# Patient Record
Sex: FEMALE | Race: WHITE | NOT HISPANIC OR LATINO | Employment: OTHER | URBAN - METROPOLITAN AREA
[De-identification: names, ages, dates, MRNs, and addresses within clinical notes are randomized per-mention and may not be internally consistent; named-entity substitution may affect disease eponyms.]

---

## 2017-01-20 ENCOUNTER — HOSPITAL ENCOUNTER (EMERGENCY)
Facility: HOSPITAL | Age: 67
Discharge: HOME | End: 2017-01-20
Attending: EMERGENCY MEDICINE
Payer: MEDICARE

## 2017-01-20 VITALS
RESPIRATION RATE: 18 BRPM | OXYGEN SATURATION: 95 % | WEIGHT: 220 LBS | HEIGHT: 68 IN | TEMPERATURE: 98 F | DIASTOLIC BLOOD PRESSURE: 86 MMHG | SYSTOLIC BLOOD PRESSURE: 153 MMHG | BODY MASS INDEX: 33.34 KG/M2 | HEART RATE: 96 BPM

## 2017-01-20 DIAGNOSIS — R07.9 CHEST PAIN, UNSPECIFIED TYPE: ICD-10-CM

## 2017-01-20 DIAGNOSIS — R82.71 BACTERIA IN URINE: ICD-10-CM

## 2017-01-20 DIAGNOSIS — J18.9 PNEUMONIA OF LEFT LUNG DUE TO INFECTIOUS ORGANISM, UNSPECIFIED PART OF LUNG: Primary | ICD-10-CM

## 2017-01-20 DIAGNOSIS — I10 ESSENTIAL HYPERTENSION: ICD-10-CM

## 2017-01-20 LAB
ALBUMIN SERPL BCP-MCNC: 3.3 G/DL
ALP SERPL-CCNC: 115 U/L
ALT SERPL W/O P-5'-P-CCNC: 26 U/L
ANION GAP SERPL CALC-SCNC: 14 MMOL/L
AST SERPL-CCNC: 25 U/L
BACTERIA #/AREA URNS HPF: ABNORMAL /HPF
BASOPHILS # BLD AUTO: 0 K/UL
BASOPHILS NFR BLD: 0 %
BILIRUB SERPL-MCNC: 1.1 MG/DL
BILIRUB UR QL STRIP: NEGATIVE
BUN SERPL-MCNC: 8 MG/DL
CALCIUM SERPL-MCNC: 9.7 MG/DL
CHLORIDE SERPL-SCNC: 106 MMOL/L
CLARITY UR: ABNORMAL
CO2 SERPL-SCNC: 22 MMOL/L
COLOR UR: YELLOW
CREAT SERPL-MCNC: 1 MG/DL
DIFFERENTIAL METHOD: ABNORMAL
EOSINOPHIL # BLD AUTO: 0 K/UL
EOSINOPHIL NFR BLD: 0.1 %
ERYTHROCYTE [DISTWIDTH] IN BLOOD BY AUTOMATED COUNT: 13.7 %
EST. GFR  (AFRICAN AMERICAN): >60 ML/MIN/1.73 M^2
EST. GFR  (NON AFRICAN AMERICAN): 58 ML/MIN/1.73 M^2
FLUAV AG SPEC QL IA: NEGATIVE
FLUBV AG SPEC QL IA: NEGATIVE
GLUCOSE SERPL-MCNC: 119 MG/DL
GLUCOSE UR QL STRIP: NEGATIVE
HCT VFR BLD AUTO: 36.5 %
HGB BLD-MCNC: 11.8 G/DL
HGB UR QL STRIP: ABNORMAL
KETONES UR QL STRIP: ABNORMAL
LEUKOCYTE ESTERASE UR QL STRIP: ABNORMAL
LYMPHOCYTES # BLD AUTO: 0.8 K/UL
LYMPHOCYTES NFR BLD: 6.9 %
MAGNESIUM SERPL-MCNC: 2.2 MG/DL
MCH RBC QN AUTO: 29 PG
MCHC RBC AUTO-ENTMCNC: 32.3 %
MCV RBC AUTO: 90 FL
MICROSCOPIC COMMENT: ABNORMAL
MONOCYTES # BLD AUTO: 1 K/UL
MONOCYTES NFR BLD: 7.9 %
NEUTROPHILS # BLD AUTO: 10.2 K/UL
NEUTROPHILS NFR BLD: 85.1 %
NITRITE UR QL STRIP: NEGATIVE
PH UR STRIP: 7 [PH] (ref 5–8)
PLATELET # BLD AUTO: 281 K/UL
PMV BLD AUTO: 9.1 FL
POTASSIUM SERPL-SCNC: 4.1 MMOL/L
PROT SERPL-MCNC: 7.7 G/DL
PROT UR QL STRIP: ABNORMAL
RBC # BLD AUTO: 4.07 M/UL
RBC #/AREA URNS HPF: 8 /HPF (ref 0–4)
SODIUM SERPL-SCNC: 142 MMOL/L
SP GR UR STRIP: <=1.005 (ref 1–1.03)
SPECIMEN SOURCE: NORMAL
URN SPEC COLLECT METH UR: ABNORMAL
UROBILINOGEN UR STRIP-ACNC: ABNORMAL EU/DL
WBC # BLD AUTO: 11.95 K/UL
WBC #/AREA URNS HPF: 40 /HPF (ref 0–5)

## 2017-01-20 PROCEDURE — 85025 COMPLETE CBC W/AUTO DIFF WBC: CPT

## 2017-01-20 PROCEDURE — 87077 CULTURE AEROBIC IDENTIFY: CPT

## 2017-01-20 PROCEDURE — 80053 COMPREHEN METABOLIC PANEL: CPT

## 2017-01-20 PROCEDURE — 81000 URINALYSIS NONAUTO W/SCOPE: CPT

## 2017-01-20 PROCEDURE — 87086 URINE CULTURE/COLONY COUNT: CPT

## 2017-01-20 PROCEDURE — 87088 URINE BACTERIA CULTURE: CPT

## 2017-01-20 PROCEDURE — 96372 THER/PROPH/DIAG INJ SC/IM: CPT

## 2017-01-20 PROCEDURE — 63600175 PHARM REV CODE 636 W HCPCS: Performed by: EMERGENCY MEDICINE

## 2017-01-20 PROCEDURE — 87186 SC STD MICRODIL/AGAR DIL: CPT

## 2017-01-20 PROCEDURE — 25000003 PHARM REV CODE 250: Performed by: EMERGENCY MEDICINE

## 2017-01-20 PROCEDURE — 83735 ASSAY OF MAGNESIUM: CPT

## 2017-01-20 PROCEDURE — 99284 EMERGENCY DEPT VISIT MOD MDM: CPT | Mod: 25

## 2017-01-20 PROCEDURE — 87400 INFLUENZA A/B EACH AG IA: CPT | Mod: 59

## 2017-01-20 RX ORDER — QUETIAPINE FUMARATE 400 MG/1
400 TABLET, FILM COATED ORAL NIGHTLY
Status: ON HOLD | COMMUNITY
End: 2021-07-22 | Stop reason: SDUPTHER

## 2017-01-20 RX ORDER — CEFTRIAXONE 1 G/1
1 INJECTION, POWDER, FOR SOLUTION INTRAMUSCULAR; INTRAVENOUS
Status: COMPLETED | OUTPATIENT
Start: 2017-01-20 | End: 2017-01-20

## 2017-01-20 RX ORDER — LEVOFLOXACIN 500 MG/1
500 TABLET, FILM COATED ORAL DAILY
Qty: 10 TABLET | Refills: 0 | Status: SHIPPED | OUTPATIENT
Start: 2017-01-20 | End: 2017-01-30

## 2017-01-20 RX ORDER — MOXIFLOXACIN HYDROCHLORIDE 400 MG/1
400 TABLET ORAL ONCE
Status: COMPLETED | OUTPATIENT
Start: 2017-01-20 | End: 2017-01-20

## 2017-01-20 RX ORDER — ACETAMINOPHEN 325 MG/1
650 TABLET ORAL
Status: COMPLETED | OUTPATIENT
Start: 2017-01-20 | End: 2017-01-20

## 2017-01-20 RX ADMIN — CEFTRIAXONE SODIUM 1 G: 1 INJECTION, POWDER, FOR SOLUTION INTRAMUSCULAR; INTRAVENOUS at 03:01

## 2017-01-20 RX ADMIN — ACETAMINOPHEN 650 MG: 325 TABLET ORAL at 01:01

## 2017-01-20 RX ADMIN — MOXIFLOXACIN HYDROCHLORIDE 400 MG: 400 TABLET, FILM COATED ORAL at 03:01

## 2017-01-20 NOTE — ED PROVIDER NOTES
SCRIBE #1 NOTE: I, Lilibeth Amato, am scribing for, and in the presence of, Jonna Yen DO. I have scribed the entire note.      History      Chief Complaint   Patient presents with    Shortness of Breath     home health nurse heard rattling in lungs, fever, and also diarrhea usually associated with UTIs, has suprapubic catheter       Review of patient's allergies indicates:   Allergen Reactions    Nucynta [tapentadol] Nausea And Vomiting    Prochlorperazine Nausea And Vomiting    Stadol [butorphanol tartrate] Nausea And Vomiting    Morpholine analogues     Reglan [metoclopramide hcl]     Toradol [ketorolac]     Zofran [ondansetron hcl (pf)]         HPI   HPI    1/20/2017, 12:18 PM   History obtained from the patient and       History of Present Illness: Desirae No is a 66 y.o. female patient who presents to the Emergency Department for shortness of breath and cough which onset gradually yesterday.  The patient's home health nurse had heard rattling in the lungs.  Pt has PMHx of PE, UTI, and suprapubic catheter (on chronic Bactrim). Symptoms are constant and moderate in severity. Pt rates 6/10 pain scale. Pt reports current pain is not similar to PE.  The pain is located in the center of the chest.  Pain is exacerbated with inhaling. Associated sxs include fever (101.7F TMax), diarrhea, nonproductive cough (this morning). Patient denies any, chills, abd pain, diaphoresis, palpitations, extremity weakness/numbness, leg pain/swelling, dizziness, calf pain, calf tenderness, n/v, and all other sxs at this time. Prior Tx includes Imodium for diarrhea. Pt takes Bactrim for UTI.  No further complaints or concerns at this time.       Arrival mode: Personal vehicle      PCP: Fauzia Muro MD       Past Medical History:  Past Medical History   Diagnosis Date    DDD (degenerative disc disease), lumbar 4/11/2014    Hypertension     Neurogenic bladder     Pulmonary embolism 4/11/2014        Past Surgical History:  Past Surgical History   Procedure Laterality Date    Cholecystectomy      Joint replacement      Cervical fusion      Neck fusion      Hysterectomy  1974     menorrhagia         Family History:  Family History   Problem Relation Age of Onset    Stroke Father        Social History:  Social History     Social History Main Topics    Smoking status: Never Smoker    Smokeless tobacco: Never Used    Alcohol use No    Drug use: No    Sexual activity: Yes     Partners: Male     Birth control/ protection: None, Surgical       ROS   Review of Systems   Constitutional: Positive for fever (101.7 TMax). Negative for chills and diaphoresis.   HENT: Negative for sore throat.    Respiratory: Positive for cough. Negative for shortness of breath.    Cardiovascular: Positive for chest pain. Negative for palpitations and leg swelling.   Gastrointestinal: Positive for diarrhea. Negative for abdominal pain, nausea and vomiting.   Genitourinary: Negative for dysuria.   Musculoskeletal: Negative for back pain.   Skin: Negative for rash.   Neurological: Negative for dizziness, weakness and numbness.   Hematological: Does not bruise/bleed easily.       Physical Exam    Initial Vitals   BP Pulse Resp Temp SpO2   01/20/17 1202 01/20/17 1202 01/20/17 1202 01/20/17 1202 01/20/17 1202   136/85 102 18 99.1 °F (37.3 °C) 94 %      Physical Exam  Nursing Notes and Vital Signs Reviewed.  Constitutional: Patient is in no acute distress. Awake and alert. Well-developed and well-nourished.  Head: Atraumatic. Normocephalic.  Eyes: PERRL. EOM intact. Conjunctivae are not pale. No scleral icterus.  ENT: Mucous membranes are moist. Oropharynx is clear and symmetric.    Neck: Supple. Full ROM. No lymphadenopathy.  Cardiovascular: Tachycardic. No murmurs, rubs, or gallops. Distal pulses are 2+ and symmetric.  Pulmonary/Chest: No respiratory distress. No wheezing, rales. Rhonchi in left lung base.  Abdominal: Soft and  "non-distended.  There is no tenderness.  No rebound, guarding, or rigidity.  Good bowel sounds.  Indwelling suprapubic miller.  Genitourinary: No CVA tenderness  Musculoskeletal: Moves all extremities. No obvious deformities. No edema. No calf tenderness.  Skin: Warm and dry.  Neurological:  Alert, awake, and appropriate.  Normal speech.  No acute focal neurological deficits are appreciated.  Psychiatric: Normal affect. Good eye contact. Appropriate in content.    ED Course    Procedures  ED Vital Signs:  Vitals:    01/20/17 1202 01/20/17 1322 01/20/17 1342 01/20/17 1432   BP: 136/85  (!) 151/92 (!) 158/77   Pulse: 102  96 95   Resp: 18  18 18   Temp: 99.1 °F (37.3 °C) 99.1 °F (37.3 °C)  98.9 °F (37.2 °C)   TempSrc: Oral   Oral   SpO2: (!) 94%  (!) 93% (!) 94%   Weight: 99.8 kg (220 lb)      Height: 5' 8" (1.727 m)       01/20/17 1537   BP: (!) 153/86   Pulse: 96   Resp: 18   Temp: 98.3 °F (36.8 °C)   TempSrc: Oral   SpO2: 95%   Weight:    Height:        Abnormal Lab Results:  Labs Reviewed   CBC W/ AUTO DIFFERENTIAL - Abnormal; Notable for the following:        Result Value    Hemoglobin 11.8 (*)     Hematocrit 36.5 (*)     MPV 9.1 (*)     Gran # 10.2 (*)     Lymph # 0.8 (*)     Gran% 85.1 (*)     Lymph% 6.9 (*)     All other components within normal limits   COMPREHENSIVE METABOLIC PANEL - Abnormal; Notable for the following:     CO2 22 (*)     Glucose 119 (*)     Albumin 3.3 (*)     Total Bilirubin 1.1 (*)     eGFR if non  58 (*)     All other components within normal limits   URINALYSIS - Abnormal; Notable for the following:     Appearance, UA Hazy (*)     Specific Gravity, UA <=1.005 (*)     Protein, UA Trace (*)     Ketones, UA Trace (*)     Occult Blood UA 2+ (*)     Urobilinogen, UA 4.0-6.0 (*)     Leukocytes, UA 3+ (*)     All other components within normal limits    Narrative:     Suprapubic miller   URINALYSIS MICROSCOPIC - Abnormal; Notable for the following:     RBC, UA 8 (*)     WBC, UA " 40 (*)     All other components within normal limits    Narrative:     Suprapubic miller   CULTURE, URINE   MAGNESIUM   INFLUENZA A AND B ANTIGEN        All Lab Results:  Results for orders placed or performed during the hospital encounter of 01/20/17   CBC auto differential   Result Value Ref Range    WBC 11.95 3.90 - 12.70 K/uL    RBC 4.07 4.00 - 5.40 M/uL    Hemoglobin 11.8 (L) 12.0 - 16.0 g/dL    Hematocrit 36.5 (L) 37.0 - 48.5 %    MCV 90 82 - 98 fL    MCH 29.0 27.0 - 31.0 pg    MCHC 32.3 32.0 - 36.0 %    RDW 13.7 11.5 - 14.5 %    Platelets 281 150 - 350 K/uL    MPV 9.1 (L) 9.2 - 12.9 fL    Gran # 10.2 (H) 1.8 - 7.7 K/uL    Lymph # 0.8 (L) 1.0 - 4.8 K/uL    Mono # 1.0 0.3 - 1.0 K/uL    Eos # 0.0 0.0 - 0.5 K/uL    Baso # 0.00 0.00 - 0.20 K/uL    Gran% 85.1 (H) 38.0 - 73.0 %    Lymph% 6.9 (L) 18.0 - 48.0 %    Mono% 7.9 4.0 - 15.0 %    Eosinophil% 0.1 0.0 - 8.0 %    Basophil% 0.0 0.0 - 1.9 %    Differential Method Automated    Comprehensive metabolic panel   Result Value Ref Range    Sodium 142 136 - 145 mmol/L    Potassium 4.1 3.5 - 5.1 mmol/L    Chloride 106 95 - 110 mmol/L    CO2 22 (L) 23 - 29 mmol/L    Glucose 119 (H) 70 - 110 mg/dL    BUN, Bld 8 8 - 23 mg/dL    Creatinine 1.0 0.5 - 1.4 mg/dL    Calcium 9.7 8.7 - 10.5 mg/dL    Total Protein 7.7 6.0 - 8.4 g/dL    Albumin 3.3 (L) 3.5 - 5.2 g/dL    Total Bilirubin 1.1 (H) 0.1 - 1.0 mg/dL    Alkaline Phosphatase 115 55 - 135 U/L    AST 25 10 - 40 U/L    ALT 26 10 - 44 U/L    Anion Gap 14 8 - 16 mmol/L    eGFR if African American >60 >60 mL/min/1.73 m^2    eGFR if non African American 58 (A) >60 mL/min/1.73 m^2   Magnesium   Result Value Ref Range    Magnesium 2.2 1.6 - 2.6 mg/dL   Urinalysis   Result Value Ref Range    Specimen UA Urine, Catheterized     Color, UA Yellow Yellow, Straw, Janet    Appearance, UA Hazy (A) Clear    pH, UA 7.0 5.0 - 8.0    Specific Gravity, UA <=1.005 (A) 1.005 - 1.030    Protein, UA Trace (A) Negative    Glucose, UA Negative Negative     Ketones, UA Trace (A) Negative    Bilirubin (UA) Negative Negative    Occult Blood UA 2+ (A) Negative    Nitrite, UA Negative Negative    Urobilinogen, UA 4.0-6.0 (A) <2.0 EU/dL    Leukocytes, UA 3+ (A) Negative   Influenza antigen Nasopharyngeal Swab   Result Value Ref Range    Influenza A Ag, EIA Negative Negative    Influenza B Ag, EIA Negative Negative    Flu A & B Source Nasopharyngeal Swab    Urinalysis Microscopic   Result Value Ref Range    RBC, UA 8 (H) 0 - 4 /hpf    WBC, UA 40 (H) 0 - 5 /hpf    Bacteria, UA Occasional None-Occ /hpf    Microscopic Comment SEE COMMENT          Imaging Results:  Imaging Results         X-Ray Chest PA And Lateral (Final result) Result time:  01/20/17 13:59:49    Final result by ALISHA Woodward Sr., MD (01/20/17 13:59:49)    Impression:        1.  There has been interval development of a mild amount of haziness in the lateral aspect of the lingula.  This is consistent with the patient's history and characteristic of a subtle pneumonia.  2.  There are minimal degenerative changes in the thoracic spine.    3.  There is anterior and posterior spinal fusion hardware in the cervical spine.        Electronically signed by: ALISHA WOODWARD MD  Date:     01/20/17  Time:    13:59     Narrative:    Two-view chest x-ray    Clinical History:  Fever    Finding: Comparison was made to a prior examination performed on 7/30/2015.  The size of the heart is normal.  There has been interval development of a mild amount of haziness in the lateral aspect of the lingula.  The right lung is clear.  There is no pneumothorax or pleural effusion.  There are minimal degenerative changes in the thoracic spine.  There is anterior and posterior spinal fusion hardware in the cervical spine.             The EKG was ordered, reviewed, and independently interpreted by the ED provider.  Interpretation time: 1434  Rate: 93 BPM  Rhythm: normal sinus rhythm  Interpretation: Septal infarct. No STEMI.           The  "Emergency Provider reviewed the vital signs and test results, which are outlined above.    ED Discussion     2:30 PM: Re-evaluated pt. Pt states she has chest pressure.  Nursing unable to place saline lock and unable to get enough blood.  Unable to lactic acid sample.     discussed with family and pt with all options including Central Line, ultrasound guidance IV placement, plain straight stick.  Patient has a history of difficult IV placement.   Pt refused all options. Pt has had Central Line attemped before her in neck and notes "frequently they are not able to place them.:  Patient notes she is having worsening chest pressure.  EKG does not show any acute changes. I discussed miss rate of EKG with NSTEMI.   I discused recommendation obtain new blood sample to check heart enzymes. Patient understands and repeated to me if missed opportunity to identify NSTEMI could result in death, permanent disability or loss of current lifestyle. Patient is refusing further attempts at blood draws, even to diagnosis NSTEMI.  Discussed treatment options with family and patient.   She had elected to receive IM injection of Rocephin to cover potential UTI and avelox to treat pneumonia.  Pt is not tachypnic, vital signs are stable, and not in any acute distress.  There is no conversational dyspnea.  Pt's CURb  65 is 1. Pt's Pneumonia Severity Index is Class 1.  Therefore treatment is outpatient for pneumonia.  Discussed in detail with patient and spouse to return to the ED for worsening SOB, weakness, confusion or worsening condition.  Although unable to acquire lactic acid, there is no Sirs/Sepsis.  There is no leukocytosis no toxic granulocytes or bandemia.   Patients vitals have remained stable.  Will give patient RX of Levaquin which should cover UTI and pneumonia.  Currently awaiting urine culture results.     3:30 PM: Reassessed pt at this time.  Pt states her condition has improved at this time. Discussed with pt all " pertinent ED information and results. Discussed pt dx and plan of tx. Gave pt all f/u and return to the ED instructions. All questions and concerns were addressed at this time. Pt expresses understanding of information and instructions, and is comfortable with plan to discharge. Pt is stable for discharge.    Pre-hypertension/Hypertension: The pt has been informed that they may have pre-hypertension or hypertension based on a blood pressure reading in the ED. I recommend that the pt call the PCP listed on their discharge instructions or a physician of their choice this week to arrange f/u for further evaluation of possible pre-hypertension or hypertension.     I have discussed with patient and/or family/caretaker chest pain precautions, specifically to return for worsening chest pain, shortness of breath, fever, or any concern.  I have low suspicion for cardiopulmonary, vascular, infectious, respiratory, or other emergent medical condition based on my evaluation in the ED.      ED Medication(s):  Medications   acetaminophen tablet 650 mg (650 mg Oral Given 1/20/17 1322)   cefTRIAXone injection 1 g (1 g Intramuscular Given 1/20/17 1525)   moxifloxacin tablet 400 mg (400 mg Oral Given 1/20/17 1525)       Discharge Medication List as of 1/20/2017  2:55 PM      START taking these medications    Details   levoFLOXacin (LEVAQUIN) 500 MG tablet Take 1 tablet (500 mg total) by mouth once daily., Starting 1/20/2017, Until Mon 1/30/17, Print             Follow-up Information     Follow up with Fauzia Muro MD. Schedule an appointment as soon as possible for a visit in 2 days.    Specialty:  Family Medicine    Why:  Return to the ED for: confusion, worsening pain, nausea, vomiting, fever, passing out, weakness or other concerns.     Contact information:    [address] [address] [address] [phone]              Medical Decision Making    Medical Decision Making:   Clinical Tests:   Lab Tests: Reviewed  and Ordered  Radiological Study: Reviewed and Ordered           Scribe Attestation:   Scribe #1: I performed the above scribed service and the documentation accurately describes the services I performed. I attest to the accuracy of the note.    Attending:   Physician Attestation Statement for Scribe #1: I, Jonna Yen DO, personally performed the services described in this documentation, as scribed by Lilibeth Amato, in my presence, and it is both accurate and complete.          Clinical Impression       ICD-10-CM ICD-9-CM   1. Pneumonia of left lung due to infectious organism, unspecified part of lung J18.9 486   2. Bacteria in urine R82.71 791.9   3. Chest pain, unspecified type R07.9 786.50   4. Essential hypertension I10 401.9       Disposition:   Disposition: Discharged  Condition: Stable           Jonna Yen DO  01/20/17 8940

## 2017-01-20 NOTE — ED NOTES
Pt spoke with Dr Yen about POC. Pt refused another IV attempt or central line access. MD explained risks and benefits of treatment. Pt understood and refused care. Pt willing to take IM antibiotics.

## 2017-01-20 NOTE — ED NOTES
Pt sitting upright in bed, aaox4, in no acute distress. Pt with no complaint at this time, stating she only has chest soreness when she coughs. Pt with no other complaint at this time. Pt updated on POC. Will continue to monitor. Bed in low position, side rails up, call light in reach, family at bedside.

## 2017-01-20 NOTE — ED AVS SNAPSHOT
[facility]  [address] [facility] [address]               Desirae No   2017 12:06 PM   ED    Description:  Female : 1950   Department:  [facility]            Your Care was Coordinated By:     Provider Role From To    Jonna Yen DO Attending Provider 17 1210 --      Reason for Visit     Shortness of Breath           Diagnoses this Visit        Comments    Pneumonia of left lung due to infectious organism, unspecified part of lung    -  Primary     Bacteria in urine         Chest pain, unspecified type           ED Disposition     None           To Do List           Follow-up Information     Follow up with Fauzia Muro MD. Schedule an appointment as soon as possible for a visit in 2 days.    Specialty:  Family Medicine    Why:  Return to the ED for: confusion, worsening pain, nausea, vomiting, fever, passing out, weakness or other concerns.     Contact information:    [address] [address] [address]  [phone]         These Medications        Disp Refills Start End    levoFLOXacin (LEVAQUIN) 500 MG tablet 10 tablet 0 2017    Take 1 tablet (500 mg total) by mouth once daily. - Oral    Pharmacy: [facility], [address] #: [phone]         [facility]     [facility] -  Assistance  Registered nurses in the [facility] provide clinical advisement, health education, appointment booking, and other advisory services.  Call for this free service at [phone].             Medications           Message regarding Medications     Verify the changes and/or additions to your medication regime listed below are the same as discussed with your clinician today.  If any of these changes or additions are incorrect, please notify your healthcare provider.        START taking these NEW medications         Refills    levoFLOXacin (LEVAQUIN) 500 MG tablet 0    Sig: Take 1 tablet (500 mg total) by mouth once daily.    Class: Print    Route: Oral      These medications were administered today        Dose Freq    acetaminophen tablet 650 mg 650 mg ED 1 Time    Sig: Take 2 tablets (650 mg total) by mouth ED 1 Time.    Class: Normal    Route: Oral    sodium chloride 0.9% bolus 1,000 mL 1,000 mL ED 1 Time    Sig: Inject 1,000 mLs into the vein ED 1 Time.    Class: Normal    Route: Intravenous    cefTRIAXone injection 1 g 1 g ED 1 Time    Sig: Inject 1 g into the muscle ED 1 Time.    Class: Normal    Route: Intramuscular    moxifloxacin tablet 400 mg 400 mg Once    Sig: Take 1 tablet (400 mg total) by mouth once.    Class: Normal    Route: Oral      STOP taking these medications     nystatin (MYCOSTATIN) ointment Apply topically 2 (two) times daily.    dicyclomine (BENTYL) 10 MG capsule Take 10 mg by mouth 2 (two) times daily.    ergocalciferol (ERGOCALCIFEROL) 50,000 unit Cap Take 1 capsule (50,000 Units total) by mouth every 7 days.           Verify that the below list of medications is an accurate representation of the medications you are currently taking.  If none reported, the list may be blank. If incorrect, please contact your healthcare provider. Carry this list with you in case of emergency.           Current Medications     aspirin (ECOTRIN) 81 MG EC tablet Take 81 mg by mouth once daily.    clonazepam (KLONOPIN) 2 MG Tab Take 2 mg by mouth 2 (two) times daily.    oxybutynin (DITROPAN) 5 MG Tab Take 5 mg by mouth 2 (two) times daily.     oxycodone (ROXICODONE) 15 MG Tab Take 30 mg by mouth every 4 (four) hours as needed.     oxymorphone (OPANA ER) 40 MG 12 hr tablet Take 40 mg by mouth every 12 (twelve) hours.    promethazine (PHENERGAN) 25 MG tablet Take 1 tablet (25 mg total) by mouth every 6 (six) hours as needed for Nausea.    quetiapine (SEROQUEL) 400 MG tablet Take 400 mg by mouth every evening.    tizanidine  "(ZANAFLEX) 4 MG tablet     cefTRIAXone injection 1 g Inject 1 g into the muscle ED 1 Time.    levoFLOXacin (LEVAQUIN) 500 MG tablet Take 1 tablet (500 mg total) by mouth once daily.    moxifloxacin tablet 400 mg Take 1 tablet (400 mg total) by mouth once.    sodium chloride 0.9% bolus 1,000 mL Inject 1,000 mLs into the vein ED 1 Time.           Clinical Reference Information           Your Vitals Were     BP Pulse Temp Resp Height Weight    158/77 (BP Location: Right arm, Patient Position: Sitting, BP Method: Automatic) 95 98.9 °F (37.2 °C) (Oral) 18 5' 8" (1.727 m) 99.8 kg (220 lb)    Last Period SpO2 BMI          04/24/1974 94% 33.45 kg/m2        Allergies as of 1/20/2017        Reactions    Nucynta [Tapentadol] Nausea And Vomiting    Prochlorperazine Nausea And Vomiting    Stadol [Butorphanol Tartrate] Nausea And Vomiting    Morpholine Analogues     Reglan [Metoclopramide Hcl]     Toradol [Ketorolac]     Zofran [Ondansetron Hcl (Pf)]       Immunizations Administered on Date of Encounter - 1/20/2017     None      ED Micro, Lab, POCT     Start Ordered       Status Ordering Provider    01/20/17 1235 01/20/17 1234  Influenza antigen Nasopharyngeal Swab  STAT      Final result     01/20/17 1235 01/20/17 1235  Urinalysis Microscopic  Once      Final result     01/20/17 1234 01/20/17 1234  CBC auto differential  STAT      Final result     01/20/17 1234 01/20/17 1234  Comprehensive metabolic panel  STAT      Final result     01/20/17 1234 01/20/17 1234  Magnesium  STAT      Final result     01/20/17 1234 01/20/17 1234  Urinalysis  STAT     Comments:  Suprapubic miller    Final result     01/20/17 1234 01/20/17 1234  Urine culture **CANNOT BE ORDERED STAT**  Once      In process     01/20/17 1234 01/20/17 1234  Blood Culture #1 **CANNOT BE ORDERED STAT**  Once      Acknowledged     01/20/17 1234 01/20/17 1234  Blood Culture #2 **CANNOT BE ORDERED STAT**  Once      Acknowledged     01/20/17 1234 01/20/17 1234  Lactic acid, " plasma  STAT      Acknowledged       ED Imaging Orders     Start Ordered       Status Ordering Provider    01/20/17 1235 01/20/17 1234  X-Ray Chest PA And Lateral  1 time imaging      Final result       Discharge References/Attachments     CHEST PAIN, UNCERTAIN CAUSE (ENGLISH)    PNEUMONIA, DISCHARGE INSTRUCTIONS FOR (ENGLISH)    LEVOFLOXACIN ORAL TABLET (ENGLISH)       [facility]  complies with applicable Federal civil rights laws and does not discriminate on the basis of race, color, national origin, age, disability, or sex.        Language Assistance Services     ATTENTION: Language assistance services are available, free of charge. Please call [phone].      ATENCIÓN: Si habla español, tiene a leos disposición servicios gratuitos de asistencia lingüística. Llame al [phone].     CHÚ Ý: N?u b?n nói Ti?ng Vi?t, có các d?ch v? h? tr? ngôn ng? mi?n phí dành cho b?n. G?i s? [phone].

## 2017-01-23 LAB — BACTERIA UR CULT: NORMAL

## 2017-02-03 PROBLEM — I70.0 ATHEROSCLEROSIS OF AORTA: Status: ACTIVE | Noted: 2017-02-03

## 2017-02-03 PROBLEM — Z93.59 SUPRAPUBIC CATHETER: Chronic | Status: ACTIVE | Noted: 2017-02-03

## 2017-02-03 PROBLEM — Z93.59 SUPRAPUBIC CATHETER: Status: ACTIVE | Noted: 2017-02-03

## 2017-02-03 PROBLEM — I70.0 ATHEROSCLEROSIS OF AORTA: Chronic | Status: ACTIVE | Noted: 2017-02-03

## 2017-02-06 ENCOUNTER — TELEPHONE (OUTPATIENT)
Dept: FAMILY MEDICINE | Facility: CLINIC | Age: 67
End: 2017-02-06

## 2017-02-06 ENCOUNTER — OFFICE VISIT (OUTPATIENT)
Dept: INTERNAL MEDICINE | Facility: CLINIC | Age: 67
End: 2017-02-06
Payer: MEDICARE

## 2017-02-06 VITALS
TEMPERATURE: 99 F | DIASTOLIC BLOOD PRESSURE: 86 MMHG | HEART RATE: 104 BPM | SYSTOLIC BLOOD PRESSURE: 126 MMHG | OXYGEN SATURATION: 96 %

## 2017-02-06 DIAGNOSIS — Z93.59 SUPRAPUBIC CATHETER: Chronic | ICD-10-CM

## 2017-02-06 DIAGNOSIS — B37.31 YEAST VAGINITIS: Primary | ICD-10-CM

## 2017-02-06 PROCEDURE — 99999 PR PBB SHADOW E&M-EST. PATIENT-LVL III: CPT | Mod: PBBFAC,,, | Performed by: PHYSICIAN ASSISTANT

## 2017-02-06 PROCEDURE — 99213 OFFICE O/P EST LOW 20 MIN: CPT | Mod: S$GLB,,, | Performed by: PHYSICIAN ASSISTANT

## 2017-02-06 PROCEDURE — 3079F DIAST BP 80-89 MM HG: CPT | Mod: S$GLB,,, | Performed by: PHYSICIAN ASSISTANT

## 2017-02-06 PROCEDURE — 1157F ADVNC CARE PLAN IN RCRD: CPT | Mod: S$GLB,,, | Performed by: PHYSICIAN ASSISTANT

## 2017-02-06 PROCEDURE — 1160F RVW MEDS BY RX/DR IN RCRD: CPT | Mod: S$GLB,,, | Performed by: PHYSICIAN ASSISTANT

## 2017-02-06 PROCEDURE — 1159F MED LIST DOCD IN RCRD: CPT | Mod: S$GLB,,, | Performed by: PHYSICIAN ASSISTANT

## 2017-02-06 PROCEDURE — 99499 UNLISTED E&M SERVICE: CPT | Mod: S$GLB,,, | Performed by: PHYSICIAN ASSISTANT

## 2017-02-06 PROCEDURE — 3074F SYST BP LT 130 MM HG: CPT | Mod: S$GLB,,, | Performed by: PHYSICIAN ASSISTANT

## 2017-02-06 RX ORDER — FLUCONAZOLE 150 MG/1
150 TABLET ORAL ONCE
Qty: 1 TABLET | Refills: 0 | Status: SHIPPED | OUTPATIENT
Start: 2017-02-06 | End: 2017-02-06

## 2017-02-06 RX ORDER — NYSTATIN 100000 U/G
CREAM TOPICAL 2 TIMES DAILY
Qty: 30 G | Refills: 5 | Status: SHIPPED | OUTPATIENT
Start: 2017-02-06 | End: 2017-02-17

## 2017-02-06 NOTE — PROGRESS NOTES
Subjective:       Patient ID: Desirae No is a 66 y.o. female.    Chief Complaint: possible yeast infection    Rash   This is a recurrent problem. The current episode started in the past 7 days. The problem is unchanged. Location: vaginal area. The rash is characterized by itchiness and redness. Associated with: recent antibiotics. Pertinent negatives include no fatigue, fever or shortness of breath. Past treatments include nothing.     Review of Systems   Constitutional: Negative for chills, fatigue and fever.   Respiratory: Negative for chest tightness and shortness of breath.    Cardiovascular: Negative for chest pain.   Gastrointestinal: Negative for abdominal pain.   Genitourinary: Negative for vaginal bleeding, vaginal discharge and vaginal pain.   Skin: Positive for rash.       Objective:      Physical Exam   Constitutional: She appears well-developed and well-nourished.   Skin: She is not diaphoretic.   Nursing note and vitals reviewed.      Assessment:       1. Yeast vaginitis    2. Suprapubic catheter        Plan:       Yeast vaginitis    Suprapubic catheter    Other orders  -     fluconazole (DIFLUCAN) 150 MG Tab; Take 1 tablet (150 mg total) by mouth once.  Dispense: 1 tablet; Refill: 0  -     nystatin (MYCOSTATIN) cream; Apply topically 2 (two) times daily.  Dispense: 30 g; Refill: 5

## 2017-02-06 NOTE — MR AVS SNAPSHOT
[facility]  [address] [place] [address]  Phone: [phone]  Fax: [phone]                  Desirae No   2017 1:20 PM   Office Visit    Description:  Female : 1950   Provider:  Mickey Umana III, PA-C   Department:  [facility]           Reason for Visit     possible yeast infection           Diagnoses this Visit        Comments    Yeast vaginitis    -  Primary     Suprapubic catheter                To Do List           Goals (5 Years of Data)     None       These Medications        Disp Refills Start End    fluconazole (DIFLUCAN) 150 MG Tab 1 tablet 0 2017    Take 1 tablet (150 mg total) by mouth once. - Oral    Pharmacy: [facility] [address] #: [phone]       nystatin (MYCOSTATIN) cream 30 g 5 2017     Apply topically 2 (two) times daily. - Topical (Top)    Pharmacy: [facility] [address] Ph #: [phone]         [facility]     [facility] Nurse Care [facility] -  Assistance  Registered nurses in the [facility] provide clinical advisement, health education, appointment booking, and other advisory services.  Call for this free service at [phone].             Medications           Message regarding Medications     Verify the changes and/or additions to your medication regime listed below are the same as discussed with your clinician today.  If any of these changes or additions are incorrect, please notify your healthcare provider.        START taking these NEW medications        Refills    fluconazole (DIFLUCAN) 150 MG Tab 0    Sig: Take 1 tablet (150 mg total) by mouth once.    Class: Normal    Route: Oral    nystatin (MYCOSTATIN) cream 5    Sig: Apply topically 2 (two) times daily.    Class: Normal    Route: Topical (Top)           Verify that the below list of medications is an accurate  representation of the medications you are currently taking.  If none reported, the list may be blank. If incorrect, please contact your healthcare provider. Carry this list with you in case of emergency.           Current Medications     aspirin (ECOTRIN) 81 MG EC tablet Take 81 mg by mouth once daily.    clonazepam (KLONOPIN) 2 MG Tab Take 2 mg by mouth 2 (two) times daily.    fluconazole (DIFLUCAN) 150 MG Tab Take 1 tablet (150 mg total) by mouth once.    nystatin (MYCOSTATIN) cream Apply topically 2 (two) times daily.    oxybutynin (DITROPAN) 5 MG Tab Take 5 mg by mouth 2 (two) times daily.     oxycodone (ROXICODONE) 15 MG Tab Take 30 mg by mouth every 4 (four) hours as needed.     oxymorphone (OPANA ER) 40 MG 12 hr tablet Take 40 mg by mouth every 12 (twelve) hours.    promethazine (PHENERGAN) 25 MG tablet Take 1 tablet (25 mg total) by mouth every 6 (six) hours as needed for Nausea.    quetiapine (SEROQUEL) 400 MG tablet Take 400 mg by mouth every evening.    tizanidine (ZANAFLEX) 4 MG tablet            Clinical Reference Information           Your Vitals Were     BP Pulse Temp Last Period SpO2       126/86 (BP Location: Left arm, Patient Position: Sitting, BP Method: Manual) 104 98.6 °F (37 °C) (Tympanic) 04/24/1974 96%       Blood Pressure          Most Recent Value    BP  126/86      Allergies as of 2/6/2017     Nucynta [Tapentadol]    Prochlorperazine    Stadol [Butorphanol Tartrate]    Morpholine Analogues    Reglan [Metoclopramide Hcl]    Toradol [Ketorolac]    Zofran [Ondansetron Hcl (Pf)]      Immunizations Administered on Date of Encounter - 2/6/2017     None      Instructions      Continue with  new medicines until gone. May take tylenol PRN fever. Increase fluids and rest. Call the clinic if not better in 3 to 5 days. Suggest togo to the Emergency Room if symptoms get much worse. Otherwise follow up with your PCP as scheduled.        Language Assistance Services     ATTENTION: Language assistance  services are available, free of charge. Please call [phone].      ATENCIÓN: Si habla rakan, tiene a leos disposición servicios gratuitos de asistencia lingüística. Llame al [phone].     CHÚ Ý: N?u b?n nói Ti?ng Vi?t, có các d?ch v? h? tr? ngôn ng? mi?n phí dành cho b?n. G?i s? [phone].         [facility] complies with applicable Federal civil rights laws and does not discriminate on the basis of race, color, national origin, age, disability, or sex.

## 2017-02-17 ENCOUNTER — OFFICE VISIT (OUTPATIENT)
Dept: INTERNAL MEDICINE | Facility: CLINIC | Age: 67
End: 2017-02-17
Payer: MEDICARE

## 2017-02-17 VITALS
HEIGHT: 68 IN | TEMPERATURE: 97 F | HEART RATE: 100 BPM | WEIGHT: 216.5 LBS | OXYGEN SATURATION: 95 % | DIASTOLIC BLOOD PRESSURE: 74 MMHG | SYSTOLIC BLOOD PRESSURE: 120 MMHG | BODY MASS INDEX: 32.81 KG/M2

## 2017-02-17 DIAGNOSIS — N89.8 VAGINAL ITCHING: Primary | ICD-10-CM

## 2017-02-17 DIAGNOSIS — B37.2 YEAST DERMATITIS: ICD-10-CM

## 2017-02-17 LAB
BACTERIA GENITAL QL WET PREP: ABNORMAL
CLUE CELLS VAG QL WET PREP: ABNORMAL
FILAMENT FUNGI VAG WET PREP-#/AREA: ABNORMAL
SPECIMEN SOURCE: ABNORMAL
T VAGINALIS GENITAL QL WET PREP: ABNORMAL
WBC #/AREA VAG WET PREP: ABNORMAL
YEAST GENITAL QL WET PREP: ABNORMAL

## 2017-02-17 PROCEDURE — 1126F AMNT PAIN NOTED NONE PRSNT: CPT | Mod: S$GLB,,, | Performed by: FAMILY MEDICINE

## 2017-02-17 PROCEDURE — 1159F MED LIST DOCD IN RCRD: CPT | Mod: S$GLB,,, | Performed by: FAMILY MEDICINE

## 2017-02-17 PROCEDURE — 3074F SYST BP LT 130 MM HG: CPT | Mod: S$GLB,,, | Performed by: FAMILY MEDICINE

## 2017-02-17 PROCEDURE — 99213 OFFICE O/P EST LOW 20 MIN: CPT | Mod: S$GLB,,, | Performed by: FAMILY MEDICINE

## 2017-02-17 PROCEDURE — 3078F DIAST BP <80 MM HG: CPT | Mod: S$GLB,,, | Performed by: FAMILY MEDICINE

## 2017-02-17 PROCEDURE — 99499 UNLISTED E&M SERVICE: CPT | Mod: S$GLB,,, | Performed by: FAMILY MEDICINE

## 2017-02-17 PROCEDURE — 99999 PR PBB SHADOW E&M-EST. PATIENT-LVL III: CPT | Mod: PBBFAC,,, | Performed by: FAMILY MEDICINE

## 2017-02-17 PROCEDURE — 1157F ADVNC CARE PLAN IN RCRD: CPT | Mod: S$GLB,,, | Performed by: FAMILY MEDICINE

## 2017-02-17 PROCEDURE — 87210 SMEAR WET MOUNT SALINE/INK: CPT

## 2017-02-17 RX ORDER — FLUCONAZOLE 150 MG/1
150 TABLET ORAL DAILY
Qty: 2 TABLET | Refills: 0 | Status: SHIPPED | OUTPATIENT
Start: 2017-02-17 | End: 2017-02-19

## 2017-02-17 RX ORDER — CLOTRIMAZOLE 1 %
CREAM (GRAM) TOPICAL 2 TIMES DAILY
Qty: 24 G | Refills: 0 | Status: SHIPPED | OUTPATIENT
Start: 2017-02-17 | End: 2017-05-23 | Stop reason: ALTCHOICE

## 2017-02-17 NOTE — PROGRESS NOTES
Subjective:       Patient ID: Desirae No is a 66 y.o. female.    Chief Complaint: Vaginal Itching    HPI Ms. No presents today vaginal itching. She complains of vaginal discharge for a couple days but it is not a lot; described as itchy, yellow, non bloody, without pelvic pain or abnormal vaginal bleeding.  She has a suprapubic catheter and has been having similar symptoms for about a year. She has done monistat and antifungal cream for the itching and feels the Lamasil worked better than nystatin. The itching she feels has gotten much worse.     Review of Systems   Constitutional: Negative for fatigue and fever.   Genitourinary: Positive for vaginal bleeding and vaginal discharge. Negative for flank pain, frequency and urgency.         Objective:        Physical Exam   Constitutional: She is oriented to person, place, and time. She appears well-developed and well-nourished.   Genitourinary:         Neurological: She is alert and oriented to person, place, and time.   Skin: Skin is warm.   Psychiatric: She has a normal mood and affect. Her behavior is normal.   Nursing note and vitals reviewed.        Assessment/Plan:   Vaginal itching  -     Vaginal Screen Vagina; Future; Expected date: 2/17/17-pending  -     fluconazole (DIFLUCAN) 150 MG Tab; Take 1 tablet (150 mg total) by mouth once daily. Repeat in 5 days  Dispense: 2 tablet; Refill: 0    Yeast dermatitis  -     clotrimazole (LOTRIMIN) 1 % cream; Apply topically 2 (two) times daily.  Dispense: 24 g; Refill: 0  -     fluconazole (DIFLUCAN) 150 MG Tab; Take 1 tablet (150 mg total) by mouth once daily. Repeat in 5 days  Dispense: 2 tablet; Refill: 0    Will follow up on Vaginal screen for potential other causes for her vaginal itching.    Return if symptoms worsen or fail to improve.    Amarilis Zacarias MD  [facility]   Family Medicine

## 2017-02-17 NOTE — MR AVS SNAPSHOT
[facility]  [address] [facility] [address]  Phone: [phone]  Fax: [phone]                  Desirae No   2017 2:20 PM   Office Visit    Description:  Female : 1950   Provider:  Amarilis Zacarias MD   Department:  [facility]           Reason for Visit     Vaginal Itching           Diagnoses this Visit        Comments    Vaginal itching    -  Primary            To Do List           Future Appointments        Provider Department Dept Phone    2017 2:20 PM Amarilis Zacarias MD [facility] [phone]      Goals (5 Years of Data)     None      [facility] Care [facility] -  Assistance  Registered nurses in the [facility] provide clinical advisement, health education, appointment booking, and other advisory services.  Call for this free service at [phone].             Medications           Message regarding Medications     Verify the changes and/or additions to your medication regime listed below are the same as discussed with your clinician today.  If any of these changes or additions are incorrect, please notify your healthcare provider.             Verify that the below list of medications is an accurate representation of the medications you are currently taking.  If none reported, the list may be blank. If incorrect, please contact your healthcare provider. Carry this list with you in case of emergency.           Current Medications     aspirin (ECOTRIN) 81 MG EC tablet Take 81 mg by mouth once daily.    nystatin (MYCOSTATIN) cream Apply topically 2 (two) times daily.    oxybutynin (DITROPAN) 5 MG Tab Take 5 mg by mouth 2 (two) times daily.     oxycodone (ROXICODONE) 15 MG Tab Take 30 mg by mouth every 4 (four) hours as needed.     oxymorphone (OPANA ER) 40 MG 12 hr tablet Take 40 mg by mouth every 12 (twelve) hours.    promethazine (PHENERGAN) 25 MG tablet Take 1  "tablet (25 mg total) by mouth every 6 (six) hours as needed for Nausea.    quetiapine (SEROQUEL) 400 MG tablet Take 400 mg by mouth every evening.    tizanidine (ZANAFLEX) 4 MG tablet     clonazepam (KLONOPIN) 2 MG Tab Take 2 mg by mouth 2 (two) times daily.           Clinical Reference Information           Your Vitals Were     BP Pulse Temp Height Weight Last Period    120/74 100 97.4 °F (36.3 °C) (Tympanic) 5' 8" (1.727 m) 98.2 kg (216 lb 7.9 oz) 04/24/1974    SpO2 BMI             95% 32.92 kg/m2         Blood Pressure          Most Recent Value    BP  120/74      Allergies as of 2/17/2017     Nucynta [Tapentadol]    Prochlorperazine    Stadol [Butorphanol Tartrate]    Morpholine Analogues    Reglan [Metoclopramide Hcl]    Toradol [Ketorolac]    Zofran [Ondansetron Hcl (Pf)]      Immunizations Administered on Date of Encounter - 2/17/2017     None      Orders Placed During Today's Visit      Normal Orders This Visit    Vaginal Screen Vagina     Future Labs/Procedures Expected by Expires    Vaginal Screen Vagina  2/17/2017 4/18/2018      Language Assistance Services     ATTENTION: Language assistance services are available, free of charge. Please call [phone].      ATENCIÓN: Si rachellela rakan, tiene a leos disposición servicios gratuitos de asistencia lingüística. Llame al [phone].     [facility] Ý: N?u b?n nói Ti?ng Vi?t, có các d?ch v? h? tr? ngôn ng? mi?n phí dành cho b?n. G?i s? [phone].         [facility] complies with applicable Federal civil rights laws and does not discriminate on the basis of race, color, national origin, age, disability, or sex.        "

## 2017-02-24 ENCOUNTER — TELEPHONE (OUTPATIENT)
Dept: INTERNAL MEDICINE | Facility: CLINIC | Age: 67
End: 2017-02-24

## 2017-02-24 NOTE — TELEPHONE ENCOUNTER
----- Message from Amarilis Zacarias MD sent at 2/20/2017  8:23 AM CST -----  Bacteria and WBCs on the vaginal swab. Please ask if her symptoms have improved. We can send antibiotics however she wasn't really having symptoms of UTI.

## 2017-05-23 ENCOUNTER — HOSPITAL ENCOUNTER (INPATIENT)
Facility: HOSPITAL | Age: 67
LOS: 6 days | Discharge: HOME | DRG: 698 | End: 2017-05-29
Attending: EMERGENCY MEDICINE | Admitting: INTERNAL MEDICINE
Payer: MEDICARE

## 2017-05-23 DIAGNOSIS — T83.511D URINARY TRACT INFECTION ASSOCIATED WITH CATHETERIZATION OF URINARY TRACT, UNSPECIFIED INDWELLING URINARY CATHETER TYPE, SUBSEQUENT ENCOUNTER: Primary | ICD-10-CM

## 2017-05-23 DIAGNOSIS — R00.0 TACHYCARDIA: ICD-10-CM

## 2017-05-23 DIAGNOSIS — R10.13 EPIGASTRIC ABDOMINAL PAIN: ICD-10-CM

## 2017-05-23 DIAGNOSIS — F13.20 BENZODIAZEPINE DEPENDENCE: ICD-10-CM

## 2017-05-23 DIAGNOSIS — R55 SYNCOPE: ICD-10-CM

## 2017-05-23 DIAGNOSIS — A41.9 SEPSIS: ICD-10-CM

## 2017-05-23 DIAGNOSIS — F11.20 CHRONIC NARCOTIC DEPENDENCE: ICD-10-CM

## 2017-05-23 DIAGNOSIS — R11.2 NON-INTRACTABLE VOMITING WITH NAUSEA, UNSPECIFIED VOMITING TYPE: ICD-10-CM

## 2017-05-23 DIAGNOSIS — E87.6 HYPOKALEMIA: ICD-10-CM

## 2017-05-23 DIAGNOSIS — N39.0 URINARY TRACT INFECTION ASSOCIATED WITH CATHETERIZATION OF URINARY TRACT, UNSPECIFIED INDWELLING URINARY CATHETER TYPE, SUBSEQUENT ENCOUNTER: Primary | ICD-10-CM

## 2017-05-23 DIAGNOSIS — A41.9 SEPSIS, DUE TO UNSPECIFIED ORGANISM: ICD-10-CM

## 2017-05-23 DIAGNOSIS — R79.89 ELEVATED TROPONIN: ICD-10-CM

## 2017-05-23 DIAGNOSIS — E86.0 DEHYDRATION: ICD-10-CM

## 2017-05-23 PROBLEM — T83.511A URINARY TRACT INFECTION ASSOCIATED WITH CATHETERIZATION OF URINARY TRACT: Status: ACTIVE | Noted: 2017-05-23

## 2017-05-23 PROBLEM — G93.40 ACUTE ENCEPHALOPATHY: Status: ACTIVE | Noted: 2017-05-23

## 2017-05-23 LAB
ALBUMIN SERPL BCP-MCNC: 4.1 G/DL
ALP SERPL-CCNC: 76 U/L
ALT SERPL W/O P-5'-P-CCNC: 14 U/L
AMPHET+METHAMPHET UR QL: NEGATIVE
ANION GAP SERPL CALC-SCNC: 12 MMOL/L
APTT BLDCRRT: 27 SEC
AST SERPL-CCNC: 22 U/L
BACTERIA #/AREA URNS HPF: ABNORMAL /HPF
BARBITURATES UR QL SCN>200 NG/ML: NEGATIVE
BASOPHILS # BLD AUTO: 0.01 K/UL
BASOPHILS NFR BLD: 0 %
BENZODIAZ UR QL SCN>200 NG/ML: NORMAL
BILIRUB SERPL-MCNC: 1.1 MG/DL
BILIRUB UR QL STRIP: ABNORMAL
BUN SERPL-MCNC: 12 MG/DL
BZE UR QL SCN: NEGATIVE
CALCIUM SERPL-MCNC: 10 MG/DL
CANNABINOIDS UR QL SCN: NEGATIVE
CHLORIDE SERPL-SCNC: 103 MMOL/L
CK MB SERPL-MCNC: 1.7 NG/ML
CK MB SERPL-RTO: 1.4 %
CK SERPL-CCNC: 125 U/L
CLARITY UR: CLEAR
CO2 SERPL-SCNC: 24 MMOL/L
COLOR UR: YELLOW
CREAT SERPL-MCNC: 0.8 MG/DL
CREAT UR-MCNC: 187.7 MG/DL
DIFFERENTIAL METHOD: ABNORMAL
EOSINOPHIL # BLD AUTO: 0 K/UL
EOSINOPHIL NFR BLD: 0 %
ERYTHROCYTE [DISTWIDTH] IN BLOOD BY AUTOMATED COUNT: 14.1 %
EST. GFR  (AFRICAN AMERICAN): >60 ML/MIN/1.73 M^2
EST. GFR  (NON AFRICAN AMERICAN): >60 ML/MIN/1.73 M^2
GLUCOSE SERPL-MCNC: 131 MG/DL
GLUCOSE UR QL STRIP: NEGATIVE
HCT VFR BLD AUTO: 47.7 %
HGB BLD-MCNC: 16.4 G/DL
HGB UR QL STRIP: ABNORMAL
HYALINE CASTS #/AREA URNS LPF: 1 /LPF
INR PPP: 1.2
KETONES UR QL STRIP: ABNORMAL
LACTATE SERPL-SCNC: 1 MMOL/L
LEUKOCYTE ESTERASE UR QL STRIP: ABNORMAL
LYMPHOCYTES # BLD AUTO: 2 K/UL
LYMPHOCYTES NFR BLD: 6.9 %
MAGNESIUM SERPL-MCNC: 2 MG/DL
MCH RBC QN AUTO: 30 PG
MCHC RBC AUTO-ENTMCNC: 34.4 %
MCV RBC AUTO: 87 FL
METHADONE UR QL SCN>300 NG/ML: NEGATIVE
MICROSCOPIC COMMENT: ABNORMAL
MONOCYTES # BLD AUTO: 1.8 K/UL
MONOCYTES NFR BLD: 6.3 %
NEUTROPHILS # BLD AUTO: 24.5 K/UL
NEUTROPHILS NFR BLD: 87.2 %
NITRITE UR QL STRIP: NEGATIVE
OPIATES UR QL SCN: NEGATIVE
PCP UR QL SCN>25 NG/ML: NEGATIVE
PH UR STRIP: 7 [PH] (ref 5–8)
PHOSPHATE SERPL-MCNC: 1.9 MG/DL
PLATELET # BLD AUTO: 384 K/UL
PMV BLD AUTO: 9.3 FL
POTASSIUM SERPL-SCNC: 2.6 MMOL/L
PROT SERPL-MCNC: 8.4 G/DL
PROT UR QL STRIP: ABNORMAL
PROTHROMBIN TIME: 12.1 SEC
RBC # BLD AUTO: 5.47 M/UL
RBC #/AREA URNS HPF: 4 /HPF (ref 0–4)
SODIUM SERPL-SCNC: 139 MMOL/L
SP GR UR STRIP: 1.02 (ref 1–1.03)
TOXICOLOGY INFORMATION: NORMAL
TROPONIN I SERPL DL<=0.01 NG/ML-MCNC: 0.11 NG/ML
URN SPEC COLLECT METH UR: ABNORMAL
UROBILINOGEN UR STRIP-ACNC: NEGATIVE EU/DL
WBC # BLD AUTO: 28.31 K/UL
WBC #/AREA URNS HPF: 25 /HPF (ref 0–5)

## 2017-05-23 PROCEDURE — 99285 EMERGENCY DEPT VISIT HI MDM: CPT | Mod: 25

## 2017-05-23 PROCEDURE — 96375 TX/PRO/DX INJ NEW DRUG ADDON: CPT

## 2017-05-23 PROCEDURE — 36569 INSJ PICC 5 YR+ W/O IMAGING: CPT

## 2017-05-23 PROCEDURE — 83605 ASSAY OF LACTIC ACID: CPT | Mod: 91

## 2017-05-23 PROCEDURE — 85730 THROMBOPLASTIN TIME PARTIAL: CPT | Mod: 91

## 2017-05-23 PROCEDURE — 96365 THER/PROPH/DIAG IV INF INIT: CPT

## 2017-05-23 PROCEDURE — 84100 ASSAY OF PHOSPHORUS: CPT

## 2017-05-23 PROCEDURE — 80053 COMPREHEN METABOLIC PANEL: CPT | Mod: 91

## 2017-05-23 PROCEDURE — 93010 ELECTROCARDIOGRAM REPORT: CPT | Mod: 76,,, | Performed by: INTERNAL MEDICINE

## 2017-05-23 PROCEDURE — 96366 THER/PROPH/DIAG IV INF ADDON: CPT

## 2017-05-23 PROCEDURE — 82553 CREATINE MB FRACTION: CPT

## 2017-05-23 PROCEDURE — 25000003 PHARM REV CODE 250: Performed by: EMERGENCY MEDICINE

## 2017-05-23 PROCEDURE — 96367 TX/PROPH/DG ADDL SEQ IV INF: CPT

## 2017-05-23 PROCEDURE — 11000001 HC ACUTE MED/SURG PRIVATE ROOM

## 2017-05-23 PROCEDURE — 85610 PROTHROMBIN TIME: CPT | Mod: 91

## 2017-05-23 PROCEDURE — 82570 ASSAY OF URINE CREATININE: CPT

## 2017-05-23 PROCEDURE — 83735 ASSAY OF MAGNESIUM: CPT

## 2017-05-23 PROCEDURE — 63600175 PHARM REV CODE 636 W HCPCS: Performed by: EMERGENCY MEDICINE

## 2017-05-23 PROCEDURE — 85025 COMPLETE CBC W/AUTO DIFF WBC: CPT | Mod: 91

## 2017-05-23 PROCEDURE — 87086 URINE CULTURE/COLONY COUNT: CPT

## 2017-05-23 PROCEDURE — 81000 URINALYSIS NONAUTO W/SCOPE: CPT | Mod: 91

## 2017-05-23 PROCEDURE — 84484 ASSAY OF TROPONIN QUANT: CPT | Mod: 91

## 2017-05-23 PROCEDURE — 96361 HYDRATE IV INFUSION ADD-ON: CPT | Mod: 59

## 2017-05-23 PROCEDURE — 96372 THER/PROPH/DIAG INJ SC/IM: CPT

## 2017-05-23 RX ORDER — POTASSIUM CHLORIDE 14.9 MG/ML
20 INJECTION INTRAVENOUS
Status: COMPLETED | OUTPATIENT
Start: 2017-05-23 | End: 2017-05-24

## 2017-05-23 RX ORDER — ACETAMINOPHEN 500 MG
1000 TABLET ORAL
Status: COMPLETED | OUTPATIENT
Start: 2017-05-23 | End: 2017-05-23

## 2017-05-23 RX ORDER — SODIUM CHLORIDE 9 MG/ML
INJECTION, SOLUTION INTRAVENOUS CONTINUOUS
Status: DISCONTINUED | OUTPATIENT
Start: 2017-05-24 | End: 2017-05-29 | Stop reason: HOSPADM

## 2017-05-23 RX ORDER — ENOXAPARIN SODIUM 100 MG/ML
40 INJECTION SUBCUTANEOUS EVERY 24 HOURS
Status: DISCONTINUED | OUTPATIENT
Start: 2017-05-24 | End: 2017-05-29 | Stop reason: HOSPADM

## 2017-05-23 RX ADMIN — SODIUM CHLORIDE 1000 ML: 0.9 INJECTION, SOLUTION INTRAVENOUS at 09:05

## 2017-05-23 RX ADMIN — PROMETHAZINE HYDROCHLORIDE 25 MG: 25 INJECTION INTRAMUSCULAR; INTRAVENOUS at 09:05

## 2017-05-23 RX ADMIN — ACETAMINOPHEN 1000 MG: 500 TABLET ORAL at 10:05

## 2017-05-24 LAB
ANION GAP SERPL CALC-SCNC: 11 MMOL/L
BASOPHILS # BLD AUTO: 0.02 K/UL
BASOPHILS NFR BLD: 0.1 %
BUN SERPL-MCNC: 13 MG/DL
CALCIUM SERPL-MCNC: 8.8 MG/DL
CHLORIDE SERPL-SCNC: 108 MMOL/L
CO2 SERPL-SCNC: 22 MMOL/L
CREAT SERPL-MCNC: 0.8 MG/DL
DIASTOLIC DYSFUNCTION: NO
DIFFERENTIAL METHOD: ABNORMAL
EOSINOPHIL # BLD AUTO: 0 K/UL
EOSINOPHIL NFR BLD: 0 %
ERYTHROCYTE [DISTWIDTH] IN BLOOD BY AUTOMATED COUNT: 14.3 %
EST. GFR  (AFRICAN AMERICAN): >60 ML/MIN/1.73 M^2
EST. GFR  (NON AFRICAN AMERICAN): >60 ML/MIN/1.73 M^2
GLUCOSE SERPL-MCNC: 124 MG/DL
HCT VFR BLD AUTO: 46.5 %
HGB BLD-MCNC: 15.9 G/DL
LYMPHOCYTES # BLD AUTO: 2.3 K/UL
LYMPHOCYTES NFR BLD: 7.8 %
MCH RBC QN AUTO: 30.2 PG
MCHC RBC AUTO-ENTMCNC: 34.2 %
MCV RBC AUTO: 88 FL
MONOCYTES # BLD AUTO: 1.8 K/UL
MONOCYTES NFR BLD: 6.2 %
NEUTROPHILS # BLD AUTO: 24.6 K/UL
NEUTROPHILS NFR BLD: 86.5 %
PLATELET # BLD AUTO: 384 K/UL
PMV BLD AUTO: 9.5 FL
POTASSIUM SERPL-SCNC: 3.1 MMOL/L
RBC # BLD AUTO: 5.26 M/UL
RETIRED EF AND QEF - SEE NOTES: 55 (ref 55–65)
SODIUM SERPL-SCNC: 141 MMOL/L
TROPONIN I SERPL DL<=0.01 NG/ML-MCNC: 0.09 NG/ML
TROPONIN I SERPL DL<=0.01 NG/ML-MCNC: 0.15 NG/ML
TROPONIN I SERPL DL<=0.01 NG/ML-MCNC: 0.15 NG/ML
WBC # BLD AUTO: 28.69 K/UL

## 2017-05-24 PROCEDURE — 21400001 HC TELEMETRY ROOM

## 2017-05-24 PROCEDURE — 63600175 PHARM REV CODE 636 W HCPCS: Performed by: INTERNAL MEDICINE

## 2017-05-24 PROCEDURE — 25000003 PHARM REV CODE 250: Performed by: EMERGENCY MEDICINE

## 2017-05-24 PROCEDURE — 84484 ASSAY OF TROPONIN QUANT: CPT | Mod: 91

## 2017-05-24 PROCEDURE — 25000003 PHARM REV CODE 250: Performed by: INTERNAL MEDICINE

## 2017-05-24 PROCEDURE — 63600175 PHARM REV CODE 636 W HCPCS: Performed by: EMERGENCY MEDICINE

## 2017-05-24 PROCEDURE — 63600175 PHARM REV CODE 636 W HCPCS: Performed by: NURSE PRACTITIONER

## 2017-05-24 PROCEDURE — C8929 TTE W OR WO FOL WCON,DOPPLER: HCPCS

## 2017-05-24 PROCEDURE — 80048 BASIC METABOLIC PNL TOTAL CA: CPT

## 2017-05-24 PROCEDURE — 25000003 PHARM REV CODE 250: Performed by: NURSE PRACTITIONER

## 2017-05-24 PROCEDURE — 36415 COLL VENOUS BLD VENIPUNCTURE: CPT

## 2017-05-24 PROCEDURE — 02HV33Z INSERTION OF INFUSION DEVICE INTO SUPERIOR VENA CAVA, PERCUTANEOUS APPROACH: ICD-10-PCS | Performed by: EMERGENCY MEDICINE

## 2017-05-24 PROCEDURE — 93306 TTE W/DOPPLER COMPLETE: CPT | Mod: 26,,, | Performed by: INTERNAL MEDICINE

## 2017-05-24 PROCEDURE — 85025 COMPLETE CBC W/AUTO DIFF WBC: CPT

## 2017-05-24 PROCEDURE — 93010 ELECTROCARDIOGRAM REPORT: CPT | Mod: ,,, | Performed by: INTERNAL MEDICINE

## 2017-05-24 PROCEDURE — 93005 ELECTROCARDIOGRAM TRACING: CPT

## 2017-05-24 RX ORDER — HYDRALAZINE HYDROCHLORIDE 20 MG/ML
10 INJECTION INTRAMUSCULAR; INTRAVENOUS EVERY 6 HOURS PRN
Status: DISCONTINUED | OUTPATIENT
Start: 2017-05-24 | End: 2017-05-29 | Stop reason: HOSPADM

## 2017-05-24 RX ORDER — SODIUM CHLORIDE 0.9 % (FLUSH) 0.9 %
10 SYRINGE (ML) INJECTION
Status: DISCONTINUED | OUTPATIENT
Start: 2017-05-24 | End: 2017-05-29 | Stop reason: HOSPADM

## 2017-05-24 RX ORDER — ACETAMINOPHEN 325 MG/1
650 TABLET ORAL EVERY 6 HOURS PRN
Status: DISCONTINUED | OUTPATIENT
Start: 2017-05-24 | End: 2017-05-29 | Stop reason: HOSPADM

## 2017-05-24 RX ORDER — ADHESIVE BANDAGE
30 BANDAGE TOPICAL DAILY PRN
Status: DISCONTINUED | OUTPATIENT
Start: 2017-05-24 | End: 2017-05-29 | Stop reason: HOSPADM

## 2017-05-24 RX ORDER — ENOXAPARIN SODIUM 100 MG/ML
1 INJECTION SUBCUTANEOUS
Status: COMPLETED | OUTPATIENT
Start: 2017-05-24 | End: 2017-05-24

## 2017-05-24 RX ORDER — POTASSIUM CHLORIDE 20 MEQ/1
40 TABLET, EXTENDED RELEASE ORAL ONCE
Status: COMPLETED | OUTPATIENT
Start: 2017-05-24 | End: 2017-05-24

## 2017-05-24 RX ORDER — IPRATROPIUM BROMIDE AND ALBUTEROL SULFATE 2.5; .5 MG/3ML; MG/3ML
3 SOLUTION RESPIRATORY (INHALATION) EVERY 4 HOURS PRN
Status: DISCONTINUED | OUTPATIENT
Start: 2017-05-24 | End: 2017-05-29 | Stop reason: HOSPADM

## 2017-05-24 RX ORDER — POLYETHYLENE GLYCOL 3350 17 G/17G
17 POWDER, FOR SOLUTION ORAL DAILY
Status: DISCONTINUED | OUTPATIENT
Start: 2017-05-24 | End: 2017-05-29 | Stop reason: HOSPADM

## 2017-05-24 RX ORDER — CEFEPIME HYDROCHLORIDE 1 G/50ML
1 INJECTION, SOLUTION INTRAVENOUS
Status: DISCONTINUED | OUTPATIENT
Start: 2017-05-24 | End: 2017-05-29 | Stop reason: HOSPADM

## 2017-05-24 RX ORDER — ONDANSETRON 2 MG/ML
4 INJECTION INTRAMUSCULAR; INTRAVENOUS EVERY 8 HOURS PRN
Status: DISCONTINUED | OUTPATIENT
Start: 2017-05-24 | End: 2017-05-29 | Stop reason: HOSPADM

## 2017-05-24 RX ORDER — SODIUM CHLORIDE 0.9 % (FLUSH) 0.9 %
10 SYRINGE (ML) INJECTION EVERY 6 HOURS
Status: DISCONTINUED | OUTPATIENT
Start: 2017-05-24 | End: 2017-05-29 | Stop reason: HOSPADM

## 2017-05-24 RX ADMIN — SODIUM CHLORIDE 1000 ML: 0.9 INJECTION, SOLUTION INTRAVENOUS at 12:05

## 2017-05-24 RX ADMIN — SODIUM CHLORIDE: 0.9 INJECTION, SOLUTION INTRAVENOUS at 11:05

## 2017-05-24 RX ADMIN — POTASSIUM CHLORIDE 20 MEQ: 200 INJECTION, SOLUTION INTRAVENOUS at 01:05

## 2017-05-24 RX ADMIN — SODIUM CHLORIDE, PRESERVATIVE FREE 10 ML: 5 INJECTION INTRAVENOUS at 06:05

## 2017-05-24 RX ADMIN — POLYETHYLENE GLYCOL 3350 17 G: 17 POWDER, FOR SOLUTION ORAL at 12:05

## 2017-05-24 RX ADMIN — ENOXAPARIN SODIUM 40 MG: 100 INJECTION SUBCUTANEOUS at 04:05

## 2017-05-24 RX ADMIN — ACETAMINOPHEN 650 MG: 325 TABLET ORAL at 09:05

## 2017-05-24 RX ADMIN — ENOXAPARIN SODIUM 90 MG: 100 INJECTION SUBCUTANEOUS at 12:05

## 2017-05-24 RX ADMIN — SODIUM CHLORIDE, PRESERVATIVE FREE 10 ML: 5 INJECTION INTRAVENOUS at 11:05

## 2017-05-24 RX ADMIN — ONDANSETRON 4 MG: 2 INJECTION INTRAMUSCULAR; INTRAVENOUS at 04:05

## 2017-05-24 RX ADMIN — POTASSIUM CHLORIDE 40 MEQ: 1500 TABLET, EXTENDED RELEASE ORAL at 03:05

## 2017-05-24 RX ADMIN — SODIUM CHLORIDE: 0.9 INJECTION, SOLUTION INTRAVENOUS at 05:05

## 2017-05-24 RX ADMIN — VANCOMYCIN HYDROCHLORIDE 1250 MG: 1 INJECTION, POWDER, LYOPHILIZED, FOR SOLUTION INTRAVENOUS at 03:05

## 2017-05-24 RX ADMIN — ACETAMINOPHEN 650 MG: 325 TABLET ORAL at 03:05

## 2017-05-24 RX ADMIN — SODIUM CHLORIDE: 0.9 INJECTION, SOLUTION INTRAVENOUS at 12:05

## 2017-05-24 RX ADMIN — POTASSIUM CHLORIDE 40 MEQ: 1500 TABLET, EXTENDED RELEASE ORAL at 01:05

## 2017-05-24 RX ADMIN — CEFTRIAXONE 1 G: 1 INJECTION, SOLUTION INTRAVENOUS at 01:05

## 2017-05-24 RX ADMIN — SODIUM CHLORIDE, PRESERVATIVE FREE 10 ML: 5 INJECTION INTRAVENOUS at 07:05

## 2017-05-24 RX ADMIN — CEFEPIME 1 G: 1 INJECTION, POWDER, FOR SOLUTION INTRAMUSCULAR; INTRAVENOUS at 12:05

## 2017-05-24 RX ADMIN — CEFEPIME 1 G: 1 INJECTION, POWDER, FOR SOLUTION INTRAMUSCULAR; INTRAVENOUS at 11:05

## 2017-05-24 RX ADMIN — ONDANSETRON 4 MG: 2 INJECTION INTRAMUSCULAR; INTRAVENOUS at 11:05

## 2017-05-24 RX ADMIN — HYDRALAZINE HYDROCHLORIDE 10 MG: 20 INJECTION INTRAMUSCULAR; INTRAVENOUS at 06:05

## 2017-05-24 NOTE — PROGRESS NOTES
[facility] Hospital Medicine  Progress Note    Patient Name: Desirae No  MRN: [id number]  Patient Class: IP- Inpatient   Admission Date: 5/23/2017  Length of Stay: 1 days  Attending Physician: Osei Lauren MD  Primary Care Provider: Fauzia Muro MD        Subjective:     Principal Problem:Sepsis    HPI:  Ms. No is a 67 y/o  female with h/o neurogenic bladder, has chronically in-dwelling suprapubic catheter, presented to the ED for second time since this mroning for worsening confusion. Last night she presented with abdominal pain, negative CT abdomen, WBC was 18,000, UA was abnormal and discharged from ED on cefuroxime orally. However over the course of the day, she progressively got more confused and was brought back to the ED. WBC increased to 28,000, troponin increased to 0.111 from 0.024. Denies chest pain or SOB. She received one dose of Lovenox 1 mg/kg in the ED.    Hospital Course:  No notes on file    Interval History: pt reports epigastric pain with mild elevation in Troponin. EKG, repeat Troponin, and Echo obtained.  Potassium repleted. Urology consulted for suprapubic catheter.       Review of Systems   Constitutional: Positive for fatigue. Negative for chills, diaphoresis and fever.   HENT: Negative.  Negative for congestion, nosebleeds and sinus pressure.    Eyes: Negative.  Negative for photophobia, redness and visual disturbance.   Respiratory: Negative.  Negative for cough and shortness of breath.    Cardiovascular: Negative.  Negative for chest pain.   Gastrointestinal: Positive for abdominal pain. Negative for diarrhea, nausea and vomiting.   Endocrine: Negative.  Negative for polydipsia, polyphagia and polyuria.   Genitourinary: Negative for dysuria, flank pain, frequency and urgency.   Musculoskeletal: Negative.  Negative for back pain and neck stiffness.   Skin: Negative.  Negative for color change, pallor and rash.   Allergic/Immunologic: Negative.   Negative for environmental allergies, food allergies and immunocompromised state.   Neurological: Negative.  Negative for dizziness, syncope, speech difficulty, numbness and headaches.   Hematological: Negative.  Does not bruise/bleed easily.   Psychiatric/Behavioral: Positive for decreased concentration. Negative for confusion and hallucinations. The patient is not nervous/anxious.    All other systems reviewed and are negative.    Objective:     Vital Signs (Most Recent):  Temp: 98.6 °F (37 °C) (05/24/17 1205)  Pulse: 105 (05/24/17 1205)  Resp: (!) 22 (05/24/17 1205)  BP: (!) 158/104 (05/24/17 1205)  SpO2: 96 % (05/24/17 1205) Vital Signs (24h Range):  Temp:  [98 °F (36.7 °C)-100 °F (37.8 °C)] 98.6 °F (37 °C)  Pulse:  [] 105  Resp:  [14-33] 22  SpO2:  [94 %-99 %] 96 %  BP: (156-187)/() 158/104     Weight: 93 kg (205 lb)  Body mass index is 31.17 kg/m².  No intake or output data in the 24 hours ending 05/24/17 1453   Physical Exam   Constitutional: No distress.   Chronically ill appearing   HENT:   Head: Normocephalic and atraumatic.   Eyes: Conjunctivae and EOM are normal. No scleral icterus.   Neck: Normal range of motion. Neck supple. No JVD present. No tracheal deviation present. No thyromegaly present.   Cardiovascular: Regular rhythm, normal heart sounds and intact distal pulses.  Tachycardia present.    No murmur heard.  Pulmonary/Chest: Effort normal and breath sounds normal. No respiratory distress. She has no wheezes. She has no rales. She exhibits no tenderness.   Abdominal: Soft. Bowel sounds are normal. She exhibits no distension. There is tenderness (epigastric).   Suprapubic catheter in place   Musculoskeletal: Normal range of motion. She exhibits no edema or tenderness.   Lymphadenopathy:     She has no cervical adenopathy.   Neurological: She is alert. She is disoriented. No cranial nerve deficit. She exhibits normal muscle tone. Coordination normal.   Skin: Skin is warm and dry. She is  not diaphoretic. No erythema.   Psychiatric: She has a normal mood and affect.   Nursing note and vitals reviewed.      Significant Labs:   CBC:   Recent Labs  Lab 05/23/17  0230 05/23/17 2150 05/24/17  0620   WBC 18.34* 28.31* 28.69*   HGB 16.1* 16.4* 15.9   HCT 47.2 47.7 46.5    384* 384*     CMP:   Recent Labs  Lab 05/23/17 0230 05/23/17 2150 05/24/17  0620    139 141   K 3.4* 2.6* 3.1*    103 108   CO2 26 24 22*   * 131* 124*   BUN 13 12 13   CREATININE 1.0 0.8 0.8   CALCIUM 10.9* 10.0 8.8   PROT 9.6* 8.4  --    ALBUMIN 4.7 4.1  --    BILITOT 0.8 1.1*  --    ALKPHOS 92 76  --    AST 20 22  --    ALT 16 14  --    ANIONGAP 14 12 11   EGFRNONAA 58* >60 >60       Significant Imaging:   Imaging Results          X-Ray Chest 1 View for PICC_Central line (Final result)  Result time 05/24/17 07:54:48    Final result by ALISHA Woodward Sr., MD (05/24/17 07:54:48)                 Impression:        1. There has been interval placement of a right PICC. The tip is in the superior vena cava.  2. The lungs are clear.   3. There is anterior and posterior spinal fusion hardware projected over the cervical spine.      Electronically signed by: ALISHA WOODWARD MD  Date:     05/24/17  Time:    07:54              Narrative:    One-view chest x-ray    Clinical History: Chest pain    Finding: Comparison was made to a prior examination performed on 5/23/2017. There has been interval placement of a right PICC. The tip is in the superior vena cava. The size of the heart is normal. The lungs are clear. There is no pneumothorax.  The costophrenic angles are sharp. There is anterior and posterior spinal fusion hardware projected over the cervical spine.                             CT Head Without Contrast (Final result)  Result time 05/23/17 22:14:50    Final result by Roosevelt Bunn MD (05/23/17 22:14:50)                 Impression:         CT scan of the brain demonstrates no acute findings or significant  interval change.  All CT scans at this facility use dose modulation, iterative reconstruction, and/or weight based dosing when appropriate to reduce radiation dose to as low as reasonable achievable.      Electronically signed by: RHONDA BARKLEY MD  Date:     05/23/17  Time:    22:14              Narrative:    CT HEAD WITHOUT CONTRAST    Date: 05/23/17 21:58:42    Clinical indication:  syncope     Technique:  Standard noncontrast CT scan of the brain. Comparison is made to previous study of 07/30/2015.     Findings:  The ventricles are nondilated. Gray and white matter structures reveal normal attenuation. No hemorrhage, mass effect or edema is identified.  There is atherosclerosis of intracranial vessels.  The skull and skull base are unremarkable.                            Assessment/Plan:      Hypokalemia    - Replace  -will continue to monitor  -repeat CMP in am           Acute encephalopathy    - Due to UTI  -improved  -neuro checks  - CT head unremarkable          Elevated troponin    - Unclear etiology, but likely suspect sepsis induced  - trended cardiac enzymes with mild elevation noted  - Patient denies chest pain or SOB but reports epigastric pain   - EKG, Echo, and repeat troponin trending  - Lovenox 1 mg/kg in ED          Urinary tract infection associated with catheterization of urinary tract    - UTI due to chronic suprapubic catheter in place  -Urology consulted to change suprapubic catheter  - Rocephin IV changed to Vanc and Cefepime  - Urine culture pending  - blood culture with NGTD          * Sepsis    - Source likely UTI  - WBC remained elevated  -IV antibiotics changed to Vanc and Cefepime  - Follow up blood and urine cultures  - IV fluids            VTE Risk Mitigation         Ordered     enoxaparin injection 40 mg  Daily     Route:  Subcutaneous        05/23/17 2322     Place sequential compression device  Until discontinued      05/24/17 0438     Medium Risk of VTE  Once      05/24/17 0023           Aleisha Saenz NP  Department of Hospital Medicine   [facility]

## 2017-05-24 NOTE — PHYSICIAN QUERY
PT Name: Desirae No  MR #: [id number]    Physician Query Form - Neurological Condition Clarification       CDS/: Megan Cochran               Contact information:[phone]    This form is a permanent document in the medical record.     Query Date: May 24, 2017    By submitting this query, we are merely seeking further clarification of documentation. Please utilize your independent clinical judgment when addressing the question(s) below.    The Medical record contains the following:   Indicators   Supporting Clinical Findings Location in Medical Record   x AMS, Confusion, LOC, etc. Confusion H&P   x Acute / Chronic Illness Sepsis,UTI,Acute encephalopathy H&P    Radiology Findings      Electrolyte Imbalance     x Medication IV Rocephin Mar 5-23    Treatment      Other       Provider, please specify the diagnosis or diagnoses associated with above clinical findings.  Please specify the type of Acute encephalopathy    [ x ] Metabolic Encephalopathy  [  ] Other Encephalopathy  [  ] Other (please specify): _____________________________________      Please document in your progress notes daily for the duration of treatment until resolved, and  include in your discharge summary.

## 2017-05-24 NOTE — ASSESSMENT & PLAN NOTE
- Unclear etiology, but likely suspect sepsis induced  - Will trend cardiac enzymes  - Patient denies chest pain or SOB  - Lovenox 1 mg/kg in ED

## 2017-05-24 NOTE — SUBJECTIVE & OBJECTIVE
Past Medical History:   Diagnosis Date    DDD (degenerative disc disease), lumbar 4/11/2014    Hypertension     Neurogenic bladder     Pulmonary embolism 4/11/2014       Past Surgical History:   Procedure Laterality Date    CERVICAL FUSION      CHOLECYSTECTOMY      HYSTERECTOMY  1974    menorrhagia    JOINT REPLACEMENT      neck fusion         Review of patient's allergies indicates:   Allergen Reactions    Nucynta [tapentadol] Nausea And Vomiting    Prochlorperazine Nausea And Vomiting    Stadol [butorphanol tartrate] Nausea And Vomiting    Morpholine analogues     Reglan [metoclopramide hcl]     Toradol [ketorolac]     Zofran [ondansetron hcl (pf)]        Current Facility-Administered Medications on File Prior to Encounter   Medication    [COMPLETED] 0.9%  NaCl infusion    [COMPLETED] cefTRIAXone (ROCEPHIN) 1 g in dextrose 5 % 50 mL IVPB    [COMPLETED] fentaNYL injection 50 mcg    [COMPLETED] GI cocktail (mylanta 30 mL, lidocaine 2 % viscous 10 mL, dicyclomine 10 mL) 50 mL    [COMPLETED] hydromorphone (PF) injection 0.5 mg    [COMPLETED] omnipaque 350 iohexol 75 mL    [COMPLETED] promethazine (PHENERGAN) 6.25 mg in dextrose 5 % 50 mL IVPB     Current Outpatient Prescriptions on File Prior to Encounter   Medication Sig    oxymorphone (OPANA ER) 40 MG 12 hr tablet Take 40 mg by mouth every 12 (twelve) hours.    quetiapine (SEROQUEL) 400 MG tablet Take 400 mg by mouth every evening.    tizanidine (ZANAFLEX) 4 MG tablet     cefUROXime (CEFTIN) 500 MG tablet Take 1 tablet (500 mg total) by mouth 2 (two) times daily.    clonazepam (KLONOPIN) 2 MG Tab Take 2 mg by mouth 2 (two) times daily.    oxybutynin (DITROPAN) 5 MG Tab Take 5 mg by mouth 2 (two) times daily.     oxycodone (ROXICODONE) 15 MG Tab Take 30 mg by mouth every 4 (four) hours as needed.     promethazine (PHENERGAN) 25 MG tablet Take 1 tablet (25 mg total) by mouth every 6 (six) hours as needed for Nausea.     Family History      Problem Relation (Age of Onset)    Stroke Father        Social History Main Topics    Smoking status: Never Smoker    Smokeless tobacco: Never Used    Alcohol use No    Drug use: No    Sexual activity: Yes     Partners: Male     Birth control/ protection: None, Surgical     Review of Systems   Constitutional: Positive for fatigue. Negative for chills, diaphoresis and fever.   HENT: Negative.  Negative for congestion, nosebleeds and sinus pressure.    Eyes: Negative.  Negative for photophobia, redness and visual disturbance.   Respiratory: Negative.  Negative for cough and shortness of breath.    Cardiovascular: Negative.  Negative for chest pain.   Gastrointestinal: Positive for abdominal pain and nausea. Negative for diarrhea and vomiting.   Endocrine: Negative.  Negative for polydipsia, polyphagia and polyuria.   Genitourinary: Positive for dysuria. Negative for flank pain, frequency and urgency.   Musculoskeletal: Negative.  Negative for back pain and neck stiffness.   Skin: Negative.  Negative for color change, pallor and rash.   Allergic/Immunologic: Negative.  Negative for environmental allergies, food allergies and immunocompromised state.   Neurological: Negative.  Negative for dizziness, syncope, speech difficulty, numbness and headaches.   Hematological: Negative.  Does not bruise/bleed easily.   Psychiatric/Behavioral: Positive for confusion and decreased concentration. Negative for hallucinations. The patient is not nervous/anxious.    All other systems reviewed and are negative.    Objective:     Vital Signs (Most Recent):  Temp: 99.1 °F (37.3 °C) (05/23/17 2353)  Pulse: (!) 116 (05/23/17 2045)  Resp: (!) 24 (sitting) (05/23/17 2302)  BP: (!) 166/99 (sitting) (05/23/17 2302)  SpO2: 97 % (sitting) (05/23/17 2302) Vital Signs (24h Range):  Temp:  [98.6 °F (37 °C)-101.6 °F (38.7 °C)] 99.1 °F (37.3 °C)  Pulse:  [] 116  Resp:  [14-29] 24  SpO2:  [95 %-99 %] 97 %  BP: (148-187)/() 166/99      Weight: 93 kg (205 lb)  Body mass index is 31.17 kg/m².    Physical Exam   Constitutional: No distress.   Chronically ill appearing   HENT:   Head: Normocephalic and atraumatic.   Eyes: Conjunctivae and EOM are normal. No scleral icterus.   Neck: Normal range of motion. Neck supple. No JVD present. No tracheal deviation present. No thyromegaly present.   Cardiovascular: Regular rhythm, normal heart sounds and intact distal pulses.  Tachycardia present.    No murmur heard.  Pulmonary/Chest: Effort normal and breath sounds normal. No respiratory distress. She has no wheezes. She has no rales. She exhibits no tenderness.   Abdominal: Soft. Bowel sounds are normal. She exhibits no distension. There is tenderness (mild lower abdominal/suprapubic).   Musculoskeletal: Normal range of motion. She exhibits no edema or tenderness.   Lymphadenopathy:     She has no cervical adenopathy.   Neurological: She is alert. She is disoriented. No cranial nerve deficit. She exhibits normal muscle tone. Coordination normal.   Skin: Skin is warm and dry. She is not diaphoretic. No erythema.   Psychiatric: She has a normal mood and affect.   Nursing note and vitals reviewed.       Significant Labs:   ABGs: No results for input(s): PH, PCO2, HCO3, POCSATURATED, BE in the last 48 hours.  Bilirubin:   Recent Labs  Lab 05/23/17 0230 05/23/17 2150   BILITOT 0.8 1.1*     Blood Culture:   Recent Labs  Lab 05/23/17 0230 05/23/17  0245   LABBLOO No Growth to date No Growth to date     BMP:   Recent Labs  Lab 05/23/17 2150   *      K 2.6*      CO2 24   BUN 12   CREATININE 0.8   CALCIUM 10.0   MG 2.0     CBC:   Recent Labs  Lab 05/23/17 0230 05/23/17 2150   WBC 18.34* 28.31*   HGB 16.1* 16.4*   HCT 47.2 47.7    384*     CMP:   Recent Labs  Lab 05/23/17 0230 05/23/17 2150    139   K 3.4* 2.6*    103   CO2 26 24   * 131*   BUN 13 12   CREATININE 1.0 0.8   CALCIUM 10.9* 10.0   PROT 9.6* 8.4    ALBUMIN 4.7 4.1   BILITOT 0.8 1.1*   ALKPHOS 92 76   AST 20 22   ALT 16 14   ANIONGAP 14 12   EGFRNONAA 58* >60     Cardiac Markers: No results for input(s): CKMB, MYOGLOBIN, BNP, TROPISTAT in the last 48 hours.  Lactic Acid:   Recent Labs  Lab 05/23/17  0230 05/23/17  2150   LACTATE 1.5 1.0     Lipase:   Recent Labs  Lab 05/23/17  0230   LIPASE 26     Lipid Panel: No results for input(s): CHOL, HDL, LDLCALC, TRIG, CHOLHDL in the last 48 hours.  Respiratory Culture: No results for input(s): GSRESP, RESPIRATORYC in the last 48 hours.  Troponin:   Recent Labs  Lab 05/23/17  0230 05/23/17  2150   TROPONINI 0.014 0.111*     TSH: No results for input(s): TSH in the last 4320 hours.  Urine Studies:   Recent Labs  Lab 05/23/17  2255   COLORU Yellow   APPEARANCEUA Clear   PHUR 7.0   SPECGRAV 1.025   PROTEINUA 3+*   GLUCUA Negative   KETONESU 2+*   BILIRUBINUA 1+*   OCCULTUA 2+*   NITRITE Negative   UROBILINOGEN Negative   LEUKOCYTESUR Trace*   RBCUA 4   WBCUA 25*   BACTERIA Occasional   HYALINECASTS 1     All pertinent labs within the past 24 hours have been reviewed.    Significant Imaging: I have reviewed and interpreted all pertinent imaging results/findings within the past 24 hours.     Imaging Results          CT Head Without Contrast (Final result)  Result time 05/23/17 22:14:50    Final result by Rhonda Barkley MD (05/23/17 22:14:50)                 Impression:         CT scan of the brain demonstrates no acute findings or significant interval change.  All CT scans at this facility use dose modulation, iterative reconstruction, and/or weight based dosing when appropriate to reduce radiation dose to as low as reasonable achievable.      Electronically signed by: RHONDA BARKLEY MD  Date:     05/23/17  Time:    22:14              Narrative:    CT HEAD WITHOUT CONTRAST    Date: 05/23/17 21:58:42    Clinical indication:  syncope     Technique:  Standard noncontrast CT scan of the brain. Comparison is made to  previous study of 07/30/2015.     Findings:  The ventricles are nondilated. Gray and white matter structures reveal normal attenuation. No hemorrhage, mass effect or edema is identified.  There is atherosclerosis of intracranial vessels.  The skull and skull base are unremarkable.

## 2017-05-24 NOTE — H&P
[facility] Hospital Medicine  History & Physical    Patient Name: Desirae No  MRN: [id number]  Admission Date: 5/23/2017  Attending Physician: Osei Lauren MD  Primary Care Provider: Fauzia Muro MD         Patient information was obtained from patient, spouse/SO and ER records.     Subjective:     Principal Problem:Sepsis    Chief Complaint:   Chief Complaint   Patient presents with    Urinary Tract Infection     dx here yesterday. Reports mild confusion. Oriented except date.         HPI: Ms. No is a 67 y/o  female with h/o neurogenic bladder, has chronically in-dwelling suprapubic catheter, presented to the ED for second time since this mroning for worsening confusion. Last night she presented with abdominal pain, negative CT abdomen, WBC was 18,000, UA was abnormal and discharged from ED on cefuroxime orally. However over the course of the day, she progressively got more confused and was brought back to the ED. WBC increased to 28,000, troponin increased to 0.111 from 0.024. Denies chest pain or SOB. She received one dose of Lovenox 1 mg/kg in the ED.    Past Medical History:   Diagnosis Date    DDD (degenerative disc disease), lumbar 4/11/2014    Hypertension     Neurogenic bladder     Pulmonary embolism 4/11/2014       Past Surgical History:   Procedure Laterality Date    CERVICAL FUSION      CHOLECYSTECTOMY      HYSTERECTOMY  1974    menorrhagia    JOINT REPLACEMENT      neck fusion         Review of patient's allergies indicates:   Allergen Reactions    Nucynta [tapentadol] Nausea And Vomiting    Prochlorperazine Nausea And Vomiting    Stadol [butorphanol tartrate] Nausea And Vomiting    Morpholine analogues     Reglan [metoclopramide hcl]     Toradol [ketorolac]     Zofran [ondansetron hcl (pf)]        Current Facility-Administered Medications on File Prior to Encounter   Medication    [COMPLETED] 0.9%  NaCl infusion    [COMPLETED] cefTRIAXone  (ROCEPHIN) 1 g in dextrose 5 % 50 mL IVPB    [COMPLETED] fentaNYL injection 50 mcg    [COMPLETED] GI cocktail (mylanta 30 mL, lidocaine 2 % viscous 10 mL, dicyclomine 10 mL) 50 mL    [COMPLETED] hydromorphone (PF) injection 0.5 mg    [COMPLETED] omnipaque 350 iohexol 75 mL    [COMPLETED] promethazine (PHENERGAN) 6.25 mg in dextrose 5 % 50 mL IVPB     Current Outpatient Prescriptions on File Prior to Encounter   Medication Sig    oxymorphone (OPANA ER) 40 MG 12 hr tablet Take 40 mg by mouth every 12 (twelve) hours.    quetiapine (SEROQUEL) 400 MG tablet Take 400 mg by mouth every evening.    tizanidine (ZANAFLEX) 4 MG tablet     cefUROXime (CEFTIN) 500 MG tablet Take 1 tablet (500 mg total) by mouth 2 (two) times daily.    clonazepam (KLONOPIN) 2 MG Tab Take 2 mg by mouth 2 (two) times daily.    oxybutynin (DITROPAN) 5 MG Tab Take 5 mg by mouth 2 (two) times daily.     oxycodone (ROXICODONE) 15 MG Tab Take 30 mg by mouth every 4 (four) hours as needed.     promethazine (PHENERGAN) 25 MG tablet Take 1 tablet (25 mg total) by mouth every 6 (six) hours as needed for Nausea.     Family History     Problem Relation (Age of Onset)    Stroke Father        Social History Main Topics    Smoking status: Never Smoker    Smokeless tobacco: Never Used    Alcohol use No    Drug use: No    Sexual activity: Yes     Partners: Male     Birth control/ protection: None, Surgical     Review of Systems   Constitutional: Positive for fatigue. Negative for chills, diaphoresis and fever.   HENT: Negative.  Negative for congestion, nosebleeds and sinus pressure.    Eyes: Negative.  Negative for photophobia, redness and visual disturbance.   Respiratory: Negative.  Negative for cough and shortness of breath.    Cardiovascular: Negative.  Negative for chest pain.   Gastrointestinal: Positive for abdominal pain and nausea. Negative for diarrhea and vomiting.   Endocrine: Negative.  Negative for polydipsia, polyphagia and  polyuria.   Genitourinary: Positive for dysuria. Negative for flank pain, frequency and urgency.   Musculoskeletal: Negative.  Negative for back pain and neck stiffness.   Skin: Negative.  Negative for color change, pallor and rash.   Allergic/Immunologic: Negative.  Negative for environmental allergies, food allergies and immunocompromised state.   Neurological: Negative.  Negative for dizziness, syncope, speech difficulty, numbness and headaches.   Hematological: Negative.  Does not bruise/bleed easily.   Psychiatric/Behavioral: Positive for confusion and decreased concentration. Negative for hallucinations. The patient is not nervous/anxious.    All other systems reviewed and are negative.    Objective:     Vital Signs (Most Recent):  Temp: 99.1 °F (37.3 °C) (05/23/17 2353)  Pulse: (!) 116 (05/23/17 2045)  Resp: (!) 24 (sitting) (05/23/17 2302)  BP: (!) 166/99 (sitting) (05/23/17 2302)  SpO2: 97 % (sitting) (05/23/17 2302) Vital Signs (24h Range):  Temp:  [98.6 °F (37 °C)-101.6 °F (38.7 °C)] 99.1 °F (37.3 °C)  Pulse:  [] 116  Resp:  [14-29] 24  SpO2:  [95 %-99 %] 97 %  BP: (148-187)/() 166/99     Weight: 93 kg (205 lb)  Body mass index is 31.17 kg/m².    Physical Exam   Constitutional: No distress.   Chronically ill appearing   HENT:   Head: Normocephalic and atraumatic.   Eyes: Conjunctivae and EOM are normal. No scleral icterus.   Neck: Normal range of motion. Neck supple. No JVD present. No tracheal deviation present. No thyromegaly present.   Cardiovascular: Regular rhythm, normal heart sounds and intact distal pulses.  Tachycardia present.    No murmur heard.  Pulmonary/Chest: Effort normal and breath sounds normal. No respiratory distress. She has no wheezes. She has no rales. She exhibits no tenderness.   Abdominal: Soft. Bowel sounds are normal. She exhibits no distension. There is tenderness (mild lower abdominal/suprapubic).   Musculoskeletal: Normal range of motion. She exhibits no edema or  tenderness.   Lymphadenopathy:     She has no cervical adenopathy.   Neurological: She is alert. She is disoriented. No cranial nerve deficit. She exhibits normal muscle tone. Coordination normal.   Skin: Skin is warm and dry. She is not diaphoretic. No erythema.   Psychiatric: She has a normal mood and affect.   Nursing note and vitals reviewed.       Significant Labs:   ABGs: No results for input(s): PH, PCO2, HCO3, POCSATURATED, BE in the last 48 hours.  Bilirubin:   Recent Labs  Lab 05/23/17 0230 05/23/17 2150   BILITOT 0.8 1.1*     Blood Culture:   Recent Labs  Lab 05/23/17 0230 05/23/17  0245   LABBLOO No Growth to date No Growth to date     BMP:   Recent Labs  Lab 05/23/17 2150   *      K 2.6*      CO2 24   BUN 12   CREATININE 0.8   CALCIUM 10.0   MG 2.0     CBC:   Recent Labs  Lab 05/23/17 0230 05/23/17 2150   WBC 18.34* 28.31*   HGB 16.1* 16.4*   HCT 47.2 47.7    384*     CMP:   Recent Labs  Lab 05/23/17 0230 05/23/17 2150    139   K 3.4* 2.6*    103   CO2 26 24   * 131*   BUN 13 12   CREATININE 1.0 0.8   CALCIUM 10.9* 10.0   PROT 9.6* 8.4   ALBUMIN 4.7 4.1   BILITOT 0.8 1.1*   ALKPHOS 92 76   AST 20 22   ALT 16 14   ANIONGAP 14 12   EGFRNONAA 58* >60     Cardiac Markers: No results for input(s): CKMB, MYOGLOBIN, BNP, TROPISTAT in the last 48 hours.  Lactic Acid:   Recent Labs  Lab 05/23/17 0230 05/23/17 2150   LACTATE 1.5 1.0     Lipase:   Recent Labs  Lab 05/23/17 0230   LIPASE 26     Lipid Panel: No results for input(s): CHOL, HDL, LDLCALC, TRIG, CHOLHDL in the last 48 hours.  Respiratory Culture: No results for input(s): GSRESP, RESPIRATORYC in the last 48 hours.  Troponin:   Recent Labs  Lab 05/23/17  0230 05/23/17  2150   TROPONINI 0.014 0.111*     TSH: No results for input(s): TSH in the last 4320 hours.  Urine Studies:   Recent Labs  Lab 05/23/17  7719   COLORU Yellow   APPEARANCEUA Clear   PHUR 7.0   SPECGRAV 1.025   PROTEINUA 3+*   GLUCUA  Negative   KETONESU 2+*   BILIRUBINUA 1+*   OCCULTUA 2+*   NITRITE Negative   UROBILINOGEN Negative   LEUKOCYTESUR Trace*   RBCUA 4   WBCUA 25*   BACTERIA Occasional   HYALINECASTS 1     All pertinent labs within the past 24 hours have been reviewed.    Significant Imaging: I have reviewed and interpreted all pertinent imaging results/findings within the past 24 hours.     Imaging Results          CT Head Without Contrast (Final result)  Result time 05/23/17 22:14:50    Final result by Rhonda Barkley MD (05/23/17 22:14:50)                 Impression:         CT scan of the brain demonstrates no acute findings or significant interval change.  All CT scans at this facility use dose modulation, iterative reconstruction, and/or weight based dosing when appropriate to reduce radiation dose to as low as reasonable achievable.      Electronically signed by: RHONDA BARKLEY MD  Date:     05/23/17  Time:    22:14              Narrative:    CT HEAD WITHOUT CONTRAST    Date: 05/23/17 21:58:42    Clinical indication:  syncope     Technique:  Standard noncontrast CT scan of the brain. Comparison is made to previous study of 07/30/2015.     Findings:  The ventricles are nondilated. Gray and white matter structures reveal normal attenuation. No hemorrhage, mass effect or edema is identified.  There is atherosclerosis of intracranial vessels.  The skull and skull base are unremarkable.                                Assessment/Plan:     * Sepsis    - Source likely UTI  - Rocephin 1 gm IV BID  - Follow up blood and urine cultures  - IV fluids          Urinary tract infection associated with catheterization of urinary tract    - UTI due to chronic suprapubic catheter in place  - Rocephin IV  - Follow up cultures          Acute encephalopathy    - Due to UTI  - CT head unremarkable          Elevated troponin    - Unclear etiology, but likely suspect sepsis induced  - Will trend cardiac enzymes  - Patient denies chest pain or SOB  -  Lovenox 1 mg/kg in ED          Hypokalemia    - Replace            VTE Risk Mitigation         Ordered     enoxaparin injection 40 mg  Daily     Route:  Subcutaneous        05/23/17 0073        Osei Lauren MD  Department of Hospital Medicine   [facility]

## 2017-05-24 NOTE — ED PROVIDER NOTES
"SCRIBE #1 NOTE: I, Mojgan Castillo, am scribing for, and in the presence of, Isela Hurtado MD. I have scribed the entire note.      History      Chief Complaint   Patient presents with    Urinary Tract Infection     dx here yesterday. Reports mild confusion. Oriented except date.        Review of patient's allergies indicates:   Allergen Reactions    Nucynta [tapentadol] Nausea And Vomiting    Prochlorperazine Nausea And Vomiting    Stadol [butorphanol tartrate] Nausea And Vomiting    Morpholine analogues     Reglan [metoclopramide hcl]     Toradol [ketorolac]     Zofran [ondansetron hcl (pf)]         HPI   HPI    5/23/2017, 8:22 PM   History obtained from the patient and       History of Present Illness: Desirae No is a 66 y.o. female patient who presents to the Emergency Department for syncope.  reports patient was sitting in the living room chair when he noted she was having trouble with remote control and had a "passing out spell" that last briefly.  Pt reported to the ED early this morning for abdominal pain and was discharged with a dx of a UTI. Sxs have not been relieved since last ED visit. Symptoms are episodic and moderate in severity. No mitigating or exacerbating factors reported. Associated sxs include abdominal pain, back pain (which is chronic in nature and unchanged from baseline on chronic pain medication), vomiting, and nausea. Patient denies any  Chest pain, shortness of breath, diarrhea, fever, chills, falls, no bowel or bladder incontinence, no saddle anesthesia, no urinary retention and all other sxs at this time. Prior Tx includes abx for UTI. Pt took 1 dose of abx today but did not take pain medication. Pt has had no food or drink today. Pt has a suprapubic catheter. No further complaints or concerns at this time.       Arrival mode: EMS     PCP: Fauzia Muro MD       Past Medical History:  Past Medical History:   Diagnosis Date    DDD (degenerative " disc disease), lumbar 4/11/2014    Hypertension     Neurogenic bladder     Pulmonary embolism 4/11/2014       Past Surgical History:  Past Surgical History:   Procedure Laterality Date    CERVICAL FUSION      CHOLECYSTECTOMY      HYSTERECTOMY  1974    menorrhagia    JOINT REPLACEMENT      neck fusion           Family History:  Family History   Problem Relation Age of Onset    Stroke Father        Social History:  Social History     Social History Main Topics    Smoking status: Never Smoker    Smokeless tobacco: Never Used    Alcohol use No    Drug use: No    Sexual activity: Yes     Partners: Male     Birth control/ protection: None, Surgical       ROS   Review of Systems   Constitutional: Negative for chills and fever.   HENT: Negative for sore throat.    Respiratory: Negative for shortness of breath.    Cardiovascular: Negative for chest pain.   Gastrointestinal: Positive for abdominal pain, nausea and vomiting. Negative for diarrhea.   Musculoskeletal: Positive for back pain.   Skin: Negative for rash.   Neurological: Positive for syncope. Negative for weakness.   Hematological: Does not bruise/bleed easily.   All other systems reviewed and are negative.      Physical Exam      Initial Vitals [05/23/17 2008]   BP Pulse Resp Temp SpO2   (!) 187/114 (!) 114 18 100 °F (37.8 °C) 99 %      Physical Exam  Nursing Notes and Vital Signs Reviewed.  Constitutional: Patient is in no acute distress. Well-developed and well-nourished. Disheveled.   Head: Atraumatic. Normocephalic.  Eyes: PERRL. EOM intact. Conjunctivae are not pale. No scleral icterus.  ENT: Mucous membranes are moist. Oropharynx is clear and symmetric.    Neck: Supple. Full ROM. No lymphadenopathy. No meningeal signs.   Cardiovascular: Tachycardic. Regular rhythm. No murmurs, rubs, or gallops. Distal pulses are 2+ and symmetric.  Pulmonary/Chest: No respiratory distress. Clear to auscultation bilaterally. No wheezing, rales, or  rhonchi.  Abdominal: Soft and non-distended.  There is no tenderness.  No rebound, guarding, or rigidity.  No organomegaly. No pulsatile mass. Normal bowel sounds. Suprapubic catheter in place, no evidence of surrounding infection.   Musculoskeletal: Moves all extremities. No obvious deformities. No edema. No calf tenderness. No midline spinal tenderness.   Skin: Warm and dry.  Neurological:  Alert, awake, and appropriate, appears slightly drowsy. Equal  strength.   Normal speech.  No acute focal neurological deficits are appreciated.  Psychiatric: Normal affect. Good eye contact. Appropriate in content.    ED Course    Critical Care  Date/Time: 5/23/2017 11:17 PM  Performed by: JENNY HALE  Authorized by: JENNY HALE   Direct patient critical care time: 15 minutes  Additional history critical care time: 10 minutes  Ordering / reviewing critical care time: 10 minutes  Documentation critical care time: 5 minutes  Consulting other physicians critical care time: 5 minutes  Total critical care time (exclusive of procedural time) : 45 minutes  Critical care time was exclusive of separately billable procedures and treating other patients and teaching time.  Critical care was necessary to treat or prevent imminent or life-threatening deterioration of the following conditions: sepsis.  Critical care was time spent personally by me on the following activities: blood draw for specimens, development of treatment plan with patient or surrogate, discussions with consultants, interpretation of cardiac output measurements, evaluation of patient's response to treatment, examination of patient, obtaining history from patient or surrogate, ordering and performing treatments and interventions, ordering and review of laboratory studies, ordering and review of radiographic studies, pulse oximetry, re-evaluation of patient's condition, review of old charts and vascular access procedures.        ED Vital  "Signs:  Vitals:    05/23/17 2008 05/23/17 2032 05/23/17 2045 05/23/17 2300   BP: (!) 187/114 (!) 168/95 (!) 174/89 (!) 175/94   Pulse: (!) 114 (!) 119 (!) 116    Resp: 18 14 16 (!) 29   Temp: 100 °F (37.8 °C)      TempSrc: Oral      SpO2: 99% 99% 95% 96%   Weight: 93 kg (205 lb)      Height: 5' 8" (1.727 m)       05/23/17 2302 05/23/17 2353   BP: (!) 166/99    Pulse:     Resp: (!) 24    Temp:  99.1 °F (37.3 °C)   TempSrc:  Oral   SpO2: 97%    Weight:     Height:         Abnormal Lab Results:  Labs Reviewed   CBC W/ AUTO DIFFERENTIAL - Abnormal; Notable for the following:        Result Value    WBC 28.31 (*)     RBC 5.47 (*)     Hemoglobin 16.4 (*)     Platelets 384 (*)     Gran # 24.5 (*)     Mono # 1.8 (*)     Gran% 87.2 (*)     Lymph% 6.9 (*)     All other components within normal limits   COMPREHENSIVE METABOLIC PANEL - Abnormal; Notable for the following:     Potassium 2.6 (*)     Glucose 131 (*)     Total Bilirubin 1.1 (*)     All other components within normal limits    Narrative:     K  critical result(s) called and verbal readback obtained from Keena Duke RN, 05/23/2017 22:57   TROPONIN I - Abnormal; Notable for the following:     Troponin I 0.111 (*)     All other components within normal limits   URINALYSIS - Abnormal; Notable for the following:     Protein, UA 3+ (*)     Ketones, UA 2+ (*)     Bilirubin (UA) 1+ (*)     Occult Blood UA 2+ (*)     Leukocytes, UA Trace (*)     All other components within normal limits   PHOSPHORUS - Abnormal; Notable for the following:     Phosphorus 1.9 (*)     All other components within normal limits   URINALYSIS MICROSCOPIC - Abnormal; Notable for the following:     WBC, UA 25 (*)     All other components within normal limits   CULTURE, URINE   LACTIC ACID, PLASMA   DRUG SCREEN PANEL, URINE EMERGENCY   PROTIME-INR   APTT   CK-MB   MAGNESIUM   PHOSPHORUS   MAGNESIUM        All Lab Results:  Results for orders placed or performed during the hospital encounter of " 05/23/17   CBC auto differential   Result Value Ref Range    WBC 28.31 (H) 3.90 - 12.70 K/uL    RBC 5.47 (H) 4.00 - 5.40 M/uL    Hemoglobin 16.4 (H) 12.0 - 16.0 g/dL    Hematocrit 47.7 37.0 - 48.5 %    MCV 87 82 - 98 fL    MCH 30.0 27.0 - 31.0 pg    MCHC 34.4 32.0 - 36.0 %    RDW 14.1 11.5 - 14.5 %    Platelets 384 (H) 150 - 350 K/uL    MPV 9.3 9.2 - 12.9 fL    Gran # 24.5 (H) 1.8 - 7.7 K/uL    Lymph # 2.0 1.0 - 4.8 K/uL    Mono # 1.8 (H) 0.3 - 1.0 K/uL    Eos # 0.0 0.0 - 0.5 K/uL    Baso # 0.01 0.00 - 0.20 K/uL    Gran% 87.2 (H) 38.0 - 73.0 %    Lymph% 6.9 (L) 18.0 - 48.0 %    Mono% 6.3 4.0 - 15.0 %    Eosinophil% 0.0 0.0 - 8.0 %    Basophil% 0.0 0.0 - 1.9 %    Differential Method Automated    Comprehensive metabolic panel   Result Value Ref Range    Sodium 139 136 - 145 mmol/L    Potassium 2.6 (LL) 3.5 - 5.1 mmol/L    Chloride 103 95 - 110 mmol/L    CO2 24 23 - 29 mmol/L    Glucose 131 (H) 70 - 110 mg/dL    BUN, Bld 12 8 - 23 mg/dL    Creatinine 0.8 0.5 - 1.4 mg/dL    Calcium 10.0 8.7 - 10.5 mg/dL    Total Protein 8.4 6.0 - 8.4 g/dL    Albumin 4.1 3.5 - 5.2 g/dL    Total Bilirubin 1.1 (H) 0.1 - 1.0 mg/dL    Alkaline Phosphatase 76 55 - 135 U/L    AST 22 10 - 40 U/L    ALT 14 10 - 44 U/L    Anion Gap 12 8 - 16 mmol/L    eGFR if African American >60 >60 mL/min/1.73 m^2    eGFR if non African American >60 >60 mL/min/1.73 m^2   Lactic acid, plasma   Result Value Ref Range    Lactate (Lactic Acid) 1.0 0.5 - 2.2 mmol/L   Drug screen panel, emergency   Result Value Ref Range    Benzodiazepines Presumptive Positive     Methadone metabolites Negative     Cocaine (Metab.) Negative     Opiate Scrn, Ur Negative     Barbiturate Screen, Ur Negative     Amphetamine Screen, Ur Negative     THC Negative     Phencyclidine Negative     Creatinine, Random Ur 187.7 15.0 - 325.0 mg/dL    Toxicology Information SEE COMMENT    Protime-INR   Result Value Ref Range    Prothrombin Time 12.1 9.0 - 12.5 sec    INR 1.2 0.8 - 1.2   APTT    Result Value Ref Range    aPTT 27.0 21.0 - 32.0 sec   Troponin I   Result Value Ref Range    Troponin I 0.111 (H) 0.000 - 0.026 ng/mL   CK-MB   Result Value Ref Range     20 - 180 U/L    CPK MB 1.7 0.1 - 6.5 ng/mL    MB% 1.4 0.0 - 5.0 %   Urinalysis Catheterized   Result Value Ref Range    Specimen UA Urine, Catheterized     Color, UA Yellow Yellow, Straw, Janet    Appearance, UA Clear Clear    pH, UA 7.0 5.0 - 8.0    Specific Gravity, UA 1.025 1.005 - 1.030    Protein, UA 3+ (A) Negative    Glucose, UA Negative Negative    Ketones, UA 2+ (A) Negative    Bilirubin (UA) 1+ (A) Negative    Occult Blood UA 2+ (A) Negative    Nitrite, UA Negative Negative    Urobilinogen, UA Negative <2.0 EU/dL    Leukocytes, UA Trace (A) Negative   Magnesium   Result Value Ref Range    Magnesium 2.0 1.6 - 2.6 mg/dL   Phosphorus   Result Value Ref Range    Phosphorus 1.9 (L) 2.7 - 4.5 mg/dL   Urinalysis Microscopic   Result Value Ref Range    RBC, UA 4 0 - 4 /hpf    WBC, UA 25 (H) 0 - 5 /hpf    Bacteria, UA Occasional None-Occ /hpf    Hyaline Casts, UA 1 0-1/lpf /lpf    Microscopic Comment SEE COMMENT        Imaging Results:  Imaging Results          CT Head Without Contrast (Final result)  Result time 05/23/17 22:14:50    Final result by Rhonda Barkley MD (05/23/17 22:14:50)                 Impression:         CT scan of the brain demonstrates no acute findings or significant interval change.  All CT scans at this facility use dose modulation, iterative reconstruction, and/or weight based dosing when appropriate to reduce radiation dose to as low as reasonable achievable.      Electronically signed by: RHONDA BARKLEY MD  Date:     05/23/17  Time:    22:14              Narrative:    CT HEAD WITHOUT CONTRAST    Date: 05/23/17 21:58:42    Clinical indication:  syncope     Technique:  Standard noncontrast CT scan of the brain. Comparison is made to previous study of 07/30/2015.     Findings:  The ventricles are nondilated.  Gray and white matter structures reveal normal attenuation. No hemorrhage, mass effect or edema is identified.  There is atherosclerosis of intracranial vessels.  The skull and skull base are unremarkable.                                     Name:   :  Patient MRN:   Desirae No [id number]   Account Number: Room & Bed Accession Number:   [id number]   [id number]   Authorizing Physician: Patient Class: Diagnosis:   [facility] F[facility] [R50.9 (ICD-10-CM)]   Procedure:  Exam Date: Reason for Exam:   X-Ray Chest AP Portable 2017 None Specified          RESULTS:  XR CHEST AP PORTABLE     Clinical history: R50.9 Fever, unspecified.       Findings: Cardiomediastinal silhouette is within normal limits for AP technique. The lungs appear clear of active disease. There is no evidence of pneumonia, pulmonary edema, pleural effusion, pneumothorax or other acute disease.  IMPRESSION:    No acute disease identified in the chest.          Electronically signed by:           DOLORES LEÓN MD  Date:                                                                                    17  Time:                                                                                   08:03        Signed By:  Dolores León III, MD on 2017  8:03 AM                 Name:   :  Patient MRN:   Desirae No [id number]   Account Number: Room & Bed Accession Number:   [id number]   [id number]   Authorizing Physician: Patient Class: Diagnosis:   [facility]     Procedure:  Exam Date: Reason for Exam:   CT Abdomen Pelvis With Contrast 2017 abdominal pain          RESULTS:  CT ABDOMEN PELVIS WITH CONTRAST     Clinical history: Epigastric abdominal pain     TECHNIQUE: Routine abdominal and pelvic CT performed after the IV administration of 75 mL Omnipaque 350. Coronal and sagittal images obtained. All CT scans at this facility use dose modulation, iterative reconstruction, and/or weight based  dosing when appropriate to reduce radiation dose to as low as reasonably achievable.     Comparison: prior abdominal CT 09/24/2010     FINDINGS: No acute disease is seen in the lung bases.  Lumbar fusion changes are again noted with stable chronic calcific density along the thecal sac.  There is a mild chronic anterior wedge deformity of the T12 vertebral body.  No acute osseous abnormality or suspicious bone marrow lesion identified.      There is a small hiatal hernia.  There is a moderate amount of retained stool in the distal colon and rectum consistent constipation.  There are scattered distal colonic diverticuli without evidence of acute diverticulitis.  There is no evidence of bowel obstruction, acute inflammatory process or other acute bowel pathology. The stomach is within normal limits. No evidence of appendicitis. No free intraperitoneal fluid, free air or abscess identified.     The liver is unremarkable. Cholecystectomy changes are again noted. The pancreas, spleen and adrenals are unremarkable.  IVC filter is in place.  No aortic aneurysm is identified.  Suprapubic catheter is in place with associated bladder decompression, circumferential bladder wall thickening and gas within the bladder lumen.  There is stable chronic left renal atrophy.  No evidence of urolithiasis or hydronephrosis.  No pathologically enlarged lymph nodes are identified.  IMPRESSION:      1. No acute abnormality identified in the abdomen or pelvis.  Nonacute findings, as above.           Electronically signed by:           DOLORES LEÓN MD  Date:                                                                                    05/23/17  Time:                                                                                   08:40        Signed By:  Dolores León III, MD on 5/23/2017  8:40 AM                 The EKG was ordered, reviewed, and independently interpreted by the ED provider.  Interpretation time: 21:15  Rate: 104  BPM  Rhythm: sinus tachycardia  Interpretation: Left ventricular hypertrophy with repolarization. No STEMI.        The Emergency Provider reviewed the vital signs and test results, which are outlined above.    ED Discussion     11:00 PM Pt was in ED this morning and had blood cultures drawn. Pt received 1 gram of Rocephin at 5:30 AM and 1 dose of Ceftin taken at home, all labs, chest x-ray and CT of abdomen/pelvis reviewed from this morning's visit.  IV access lost, unable to obtain further access, patient will need PICC Line, discussed with patient and her  who report patient has needed a PICC Line in the past.     11:06 PM: Re-evaluated pt. I have discussed test results, shared treatment plan, and the need for admission with patient and family at bedside. Pt and family express understanding at this time and agree with all information. All questions answered. Pt and family have no further questions or concerns at this time. Pt is ready for admit. Re-perfusion re-assessment done.     11:07 PM: Discussed case with Dr. Lauren ([facility]). Dr. Lauren agrees with current care and management of pt and accepts admission would like to give patient another dose of Rocephin at midnight would also like to give patient on dose of Lovenox 1mg/kg due to mildly elevated troponin, patient without any complaints of chest pain or pressure, no shortness of breath.    Admitting Service: [facility]   Admitting Physician: Dr. Lauren  Admit to: Telemetry      ED Medication(s):  Medications   sodium chloride 0.9% bolus 1,000 mL (not administered)   cefTRIAXone (ROCEPHIN) 1 g in dextrose 5 % 50 mL IVPB (not administered)   potassium chloride 20 mEq in 100 mL IVPB (FOR CENTRAL LINE ADMINISTRATION ONLY) (not administered)   sodium chloride 0.9% bolus 1,000 mL (not administered)   0.9%  NaCl infusion (not administered)   enoxaparin injection 40 mg (not administered)   promethazine (PHENERGAN) 6.25 mg in dextrose 5 % 50  mL IVPB (not administered)   enoxaparin injection 90 mg (not administered)   sodium chloride 0.9% bolus 1,000 mL (0 mLs Intravenous Paused 5/23/17 2223)   acetaminophen tablet 1,000 mg (1,000 mg Oral Given 5/23/17 2220)   promethazine (PHENERGAN) 25 mg in dextrose 5 % 50 mL IVPB (0 mg Intravenous Stopped 5/23/17 2223)       New Prescriptions    No medications on file             Medical Decision Making    Medical Decision Making:   Clinical Tests:   Lab Tests: Ordered and Reviewed  Radiological Study: Reviewed and Ordered  Medical Tests: Reviewed and Ordered           Scribe Attestation:   Scribe #1: I performed the above scribed service and the documentation accurately describes the services I performed. I attest to the accuracy of the note.    Attending:   Physician Attestation Statement for Scribe #1: I, Isela Hurtado MD, personally performed the services described in this documentation, as scribed by Mojgan Castillo, in my presence, and it is both accurate and complete.          Clinical Impression       ICD-10-CM ICD-9-CM   1. Urinary tract infection associated with catheterization of urinary tract, unspecified indwelling urinary catheter type, subsequent encounter T83.511D 996.64    N39.0 599.0   2. Syncope R55 780.2   3. Sepsis, due to unspecified organism A41.9 038.9     995.91   4. Non-intractable vomiting with nausea, unspecified vomiting type R11.2 787.01   5. Hypokalemia E87.6 276.8   6. Tachycardia R00.0 785.0   7. Elevated troponin R74.8 790.6   8. Chronic narcotic dependence F11.20 304.91   9. Dehydration E86.0 276.51   10. Benzodiazepine dependence F13.20 304.10       Disposition:   Disposition: Admitted  Condition: Serious         Isela Hurtado MD  05/24/17 0016

## 2017-05-24 NOTE — SUBJECTIVE & OBJECTIVE
Interval History: pt reports epigastric pain with mild elevation in Troponin. EKG, repeat Troponin, and Echo obtained.  Potassium repleted. Urology consulted for suprapubic catheter.       Review of Systems   Constitutional: Positive for fatigue. Negative for chills, diaphoresis and fever.   HENT: Negative.  Negative for congestion, nosebleeds and sinus pressure.    Eyes: Negative.  Negative for photophobia, redness and visual disturbance.   Respiratory: Negative.  Negative for cough and shortness of breath.    Cardiovascular: Negative.  Negative for chest pain.   Gastrointestinal: Positive for abdominal pain. Negative for diarrhea, nausea and vomiting.   Endocrine: Negative.  Negative for polydipsia, polyphagia and polyuria.   Genitourinary: Negative for dysuria, flank pain, frequency and urgency.   Musculoskeletal: Negative.  Negative for back pain and neck stiffness.   Skin: Negative.  Negative for color change, pallor and rash.   Allergic/Immunologic: Negative.  Negative for environmental allergies, food allergies and immunocompromised state.   Neurological: Negative.  Negative for dizziness, syncope, speech difficulty, numbness and headaches.   Hematological: Negative.  Does not bruise/bleed easily.   Psychiatric/Behavioral: Positive for decreased concentration. Negative for confusion and hallucinations. The patient is not nervous/anxious.    All other systems reviewed and are negative.    Objective:     Vital Signs (Most Recent):  Temp: 98.6 °F (37 °C) (05/24/17 1205)  Pulse: 105 (05/24/17 1205)  Resp: (!) 22 (05/24/17 1205)  BP: (!) 158/104 (05/24/17 1205)  SpO2: 96 % (05/24/17 1205) Vital Signs (24h Range):  Temp:  [98 °F (36.7 °C)-100 °F (37.8 °C)] 98.6 °F (37 °C)  Pulse:  [] 105  Resp:  [14-33] 22  SpO2:  [94 %-99 %] 96 %  BP: (156-187)/() 158/104     Weight: 93 kg (205 lb)  Body mass index is 31.17 kg/m².  No intake or output data in the 24 hours ending 05/24/17 1453   Physical Exam    Constitutional: No distress.   Chronically ill appearing   HENT:   Head: Normocephalic and atraumatic.   Eyes: Conjunctivae and EOM are normal. No scleral icterus.   Neck: Normal range of motion. Neck supple. No JVD present. No tracheal deviation present. No thyromegaly present.   Cardiovascular: Regular rhythm, normal heart sounds and intact distal pulses.  Tachycardia present.    No murmur heard.  Pulmonary/Chest: Effort normal and breath sounds normal. No respiratory distress. She has no wheezes. She has no rales. She exhibits no tenderness.   Abdominal: Soft. Bowel sounds are normal. She exhibits no distension. There is tenderness (epigastric).   Suprapubic catheter in place   Musculoskeletal: Normal range of motion. She exhibits no edema or tenderness.   Lymphadenopathy:     She has no cervical adenopathy.   Neurological: She is alert. She is disoriented. No cranial nerve deficit. She exhibits normal muscle tone. Coordination normal.   Skin: Skin is warm and dry. She is not diaphoretic. No erythema.   Psychiatric: She has a normal mood and affect.   Nursing note and vitals reviewed.      Significant Labs:   CBC:   Recent Labs  Lab 05/23/17  0230 05/23/17  2150 05/24/17  0620   WBC 18.34* 28.31* 28.69*   HGB 16.1* 16.4* 15.9   HCT 47.2 47.7 46.5    384* 384*     CMP:   Recent Labs  Lab 05/23/17  0230 05/23/17  2150 05/24/17  0620    139 141   K 3.4* 2.6* 3.1*    103 108   CO2 26 24 22*   * 131* 124*   BUN 13 12 13   CREATININE 1.0 0.8 0.8   CALCIUM 10.9* 10.0 8.8   PROT 9.6* 8.4  --    ALBUMIN 4.7 4.1  --    BILITOT 0.8 1.1*  --    ALKPHOS 92 76  --    AST 20 22  --    ALT 16 14  --    ANIONGAP 14 12 11   EGFRNONAA 58* >60 >60       Significant Imaging:   Imaging Results          X-Ray Chest 1 View for PICC_Central line (Final result)  Result time 05/24/17 07:54:48    Final result by ALISHA Woodward Sr., MD (05/24/17 07:54:48)                 Impression:        1. There has been  interval placement of a right PICC. The tip is in the superior vena cava.  2. The lungs are clear.   3. There is anterior and posterior spinal fusion hardware projected over the cervical spine.      Electronically signed by: ALISHA GUADALUPE MD  Date:     05/24/17  Time:    07:54              Narrative:    One-view chest x-ray    Clinical History: Chest pain    Finding: Comparison was made to a prior examination performed on 5/23/2017. There has been interval placement of a right PICC. The tip is in the superior vena cava. The size of the heart is normal. The lungs are clear. There is no pneumothorax.  The costophrenic angles are sharp. There is anterior and posterior spinal fusion hardware projected over the cervical spine.                             CT Head Without Contrast (Final result)  Result time 05/23/17 22:14:50    Final result by Rhonda Barkley MD (05/23/17 22:14:50)                 Impression:         CT scan of the brain demonstrates no acute findings or significant interval change.  All CT scans at this facility use dose modulation, iterative reconstruction, and/or weight based dosing when appropriate to reduce radiation dose to as low as reasonable achievable.      Electronically signed by: RHONDA BARKLEY MD  Date:     05/23/17  Time:    22:14              Narrative:    CT HEAD WITHOUT CONTRAST    Date: 05/23/17 21:58:42    Clinical indication:  syncope     Technique:  Standard noncontrast CT scan of the brain. Comparison is made to previous study of 07/30/2015.     Findings:  The ventricles are nondilated. Gray and white matter structures reveal normal attenuation. No hemorrhage, mass effect or edema is identified.  There is atherosclerosis of intracranial vessels.  The skull and skull base are unremarkable.

## 2017-05-24 NOTE — PROCEDURES
"Desirae No is a 66 y.o. female patient.    Temp: 99.1 °F (37.3 °C) (05/23/17 2353)  Pulse: (!) 116 (05/24/17 0030)  Resp: (!) 23 (05/24/17 0030)  BP: (!) 166/95 (05/24/17 0030)  SpO2: 96 % (05/24/17 0030)  Weight: 93 kg (205 lb) (05/23/17 2008)  Height: 5' 8" (172.7 cm) (05/23/17 2008)    PICC  Date/Time: 5/24/2017 12:50 AM  Consent Done: Yes  Time out: Immediately prior to procedure a time out was called to verify the correct patient, procedure, equipment, support staff and site/side marked as required  Indications: med administration and vascular access  Anesthesia: local infiltration  Local anesthetic: lidocaine 1% without epinephrine  Anesthetic Total (mL): 2  Preparation: skin prepped with ChloraPrep  Skin prep agent dried: skin prep agent completely dried prior to procedure  Sterile barriers: all five maximum sterile barriers used - cap, mask, sterile gown, sterile gloves, and large sterile sheet  Hand hygiene: hand hygiene performed prior to central venous catheter insertion  Location details: right basilic  Catheter type: double lumen  Catheter size: 5 Fr  Catheter Length: 37cm    Ultrasound guidance: yes  Vessel Caliber: medium and patent, compressibility normal  Needle advanced into vessel with real time Ultrasound guidance.  Guidewire confirmed in vessel.  Sterile sheath used.  Number of attempts: 1  Post-procedure: blood return through all ports, chlorhexidine patch and sterile dressing applied  Specimens: No  Implants: No  Assessment: placement verified by x-ray  Complications: none        Joel Delgadillo  5/24/2017  "

## 2017-05-24 NOTE — ED NOTES
Pt IV infiltrated. MD aware of loss of IV access.  MD at  to discuss central line placement or  PICC line placement.

## 2017-05-24 NOTE — ASSESSMENT & PLAN NOTE
- UTI due to chronic suprapubic catheter in place  -Urology consulted to change suprapubic catheter  - Rocephin IV changed to Vanc and Cefepime  - Urine culture pending  - blood culture with NGTD

## 2017-05-24 NOTE — PLAN OF CARE
Met with pt to complete d/c planning assessment. CM will follow for d/c needs.     05/24/17 1441   Discharge Assessment   Assessment Type Discharge Planning Assessment   Confirmed/corrected address and phone number on facesheet? Yes   Assessment information obtained from? Patient   Prior to hospitilization cognitive status: Unable to Assess   Prior to hospitalization functional status: Assistive Equipment   Current cognitive status: Alert/Oriented   Current Functional Status: Assistive Equipment   Lives With spouse   Able to Return to Prior Arrangements yes   Is patient able to care for self after discharge? Unable to determine at this time (comments)   How many people do you have in your home that can help with your care after discharge? 1   Who are your caregiver(s) and their phone number(s)? Spouse, Delmer Magdaleno [phone]   Readmission Within The Last 30 Days no previous admission in last 30 days   Patient currently receives home health services? No   Does the patient currently use HME? Yes   Equipment Currently Used at Home walker, standard   Do you have any problems affording any of your prescribed medications? No   Does the patient have transportation to healthcare appointments? Yes   Transportation Available family or friend will provide;car   Discharge Plan A Home with family   Patient/Family In Agreement With Plan yes

## 2017-05-24 NOTE — HPI
Ms. No is a 65 y/o  female with h/o neurogenic bladder, has chronically in-dwelling suprapubic catheter, presented to the ED for second time since this mroning for worsening confusion. Last night she presented with abdominal pain, negative CT abdomen, WBC was 18,000, UA was abnormal and discharged from ED on cefuroxime orally. However over the course of the day, she progressively got more confused and was brought back to the ED. WBC increased to 28,000, troponin increased to 0.111 from 0.024. Denies chest pain or SOB. She received one dose of Lovenox 1 mg/kg in the ED.

## 2017-05-24 NOTE — ASSESSMENT & PLAN NOTE
- Unclear etiology, but likely suspect sepsis induced  - trended cardiac enzymes with mild elevation noted  - Patient denies chest pain or SOB but reports epigastric pain   - EKG, Echo, and repeat troponin trending  - Lovenox 1 mg/kg in ED

## 2017-05-24 NOTE — CONSULTS
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   Katherine McArdle, Pharm.D. 5/24/2017 5:54 PM

## 2017-05-24 NOTE — ASSESSMENT & PLAN NOTE
- Source likely UTI  - WBC remained elevated  -IV antibiotics changed to Vanc and Cefepime  - Follow up blood and urine cultures  - IV fluids

## 2017-05-24 NOTE — ED NOTES
Dr. Lauren aware of pt second trop. Reports to consult cardiology this AM as routine. Denies cp, reports relief after zofran given.

## 2017-05-25 LAB
ANION GAP SERPL CALC-SCNC: 9 MMOL/L
BACTERIA UR CULT: NORMAL
BASOPHILS # BLD AUTO: ABNORMAL K/UL
BASOPHILS NFR BLD: 0 %
BUN SERPL-MCNC: 16 MG/DL
CALCIUM SERPL-MCNC: 8.8 MG/DL
CHLORIDE SERPL-SCNC: 110 MMOL/L
CO2 SERPL-SCNC: 23 MMOL/L
CREAT SERPL-MCNC: 0.8 MG/DL
DIFFERENTIAL METHOD: ABNORMAL
EOSINOPHIL # BLD AUTO: ABNORMAL K/UL
EOSINOPHIL NFR BLD: 0 %
ERYTHROCYTE [DISTWIDTH] IN BLOOD BY AUTOMATED COUNT: 14.5 %
EST. GFR  (AFRICAN AMERICAN): >60 ML/MIN/1.73 M^2
EST. GFR  (NON AFRICAN AMERICAN): >60 ML/MIN/1.73 M^2
GLUCOSE SERPL-MCNC: 121 MG/DL
HCT VFR BLD AUTO: 44.8 %
HGB BLD-MCNC: 14.7 G/DL
LYMPHOCYTES # BLD AUTO: ABNORMAL K/UL
LYMPHOCYTES NFR BLD: 4 %
MAGNESIUM SERPL-MCNC: 1.9 MG/DL
MCH RBC QN AUTO: 29.4 PG
MCHC RBC AUTO-ENTMCNC: 32.8 %
MCV RBC AUTO: 90 FL
MONOCYTES # BLD AUTO: ABNORMAL K/UL
MONOCYTES NFR BLD: 5 %
NEUTROPHILS NFR BLD: 90 %
NEUTS BAND NFR BLD MANUAL: 1 %
PHOSPHATE SERPL-MCNC: 2.1 MG/DL
PLATELET # BLD AUTO: 361 K/UL
PMV BLD AUTO: 9.9 FL
POTASSIUM SERPL-SCNC: 3.1 MMOL/L
RBC # BLD AUTO: 5 M/UL
SODIUM SERPL-SCNC: 142 MMOL/L
WBC # BLD AUTO: 32.36 K/UL

## 2017-05-25 PROCEDURE — 99232 SBSQ HOSP IP/OBS MODERATE 35: CPT | Mod: ,,, | Performed by: UROLOGY

## 2017-05-25 PROCEDURE — 63600175 PHARM REV CODE 636 W HCPCS: Performed by: EMERGENCY MEDICINE

## 2017-05-25 PROCEDURE — 63600175 PHARM REV CODE 636 W HCPCS: Performed by: NURSE PRACTITIONER

## 2017-05-25 PROCEDURE — 25000003 PHARM REV CODE 250: Performed by: INTERNAL MEDICINE

## 2017-05-25 PROCEDURE — 21400001 HC TELEMETRY ROOM

## 2017-05-25 PROCEDURE — 80048 BASIC METABOLIC PNL TOTAL CA: CPT

## 2017-05-25 PROCEDURE — 63600175 PHARM REV CODE 636 W HCPCS: Performed by: INTERNAL MEDICINE

## 2017-05-25 PROCEDURE — 84100 ASSAY OF PHOSPHORUS: CPT

## 2017-05-25 PROCEDURE — 83735 ASSAY OF MAGNESIUM: CPT

## 2017-05-25 PROCEDURE — 85027 COMPLETE CBC AUTOMATED: CPT

## 2017-05-25 PROCEDURE — 25000003 PHARM REV CODE 250: Performed by: NURSE PRACTITIONER

## 2017-05-25 PROCEDURE — 25000003 PHARM REV CODE 250: Performed by: EMERGENCY MEDICINE

## 2017-05-25 PROCEDURE — 85007 BL SMEAR W/DIFF WBC COUNT: CPT

## 2017-05-25 RX ORDER — POTASSIUM CHLORIDE 20 MEQ/1
40 TABLET, EXTENDED RELEASE ORAL ONCE
Status: COMPLETED | OUTPATIENT
Start: 2017-05-25 | End: 2017-05-25

## 2017-05-25 RX ORDER — DICYCLOMINE HYDROCHLORIDE 10 MG/1
10 CAPSULE ORAL 4 TIMES DAILY
Status: DISCONTINUED | OUTPATIENT
Start: 2017-05-25 | End: 2017-05-29 | Stop reason: HOSPADM

## 2017-05-25 RX ORDER — METRONIDAZOLE 500 MG/100ML
500 INJECTION, SOLUTION INTRAVENOUS
Status: DISCONTINUED | OUTPATIENT
Start: 2017-05-25 | End: 2017-05-29 | Stop reason: HOSPADM

## 2017-05-25 RX ADMIN — VANCOMYCIN HYDROCHLORIDE 1250 MG: 1 INJECTION, POWDER, LYOPHILIZED, FOR SOLUTION INTRAVENOUS at 06:05

## 2017-05-25 RX ADMIN — METRONIDAZOLE 500 MG: 500 INJECTION, SOLUTION INTRAVENOUS at 11:05

## 2017-05-25 RX ADMIN — DICYCLOMINE HYDROCHLORIDE 10 MG: 10 CAPSULE ORAL at 11:05

## 2017-05-25 RX ADMIN — SODIUM CHLORIDE: 0.9 INJECTION, SOLUTION INTRAVENOUS at 11:05

## 2017-05-25 RX ADMIN — POTASSIUM CHLORIDE 40 MEQ: 1500 TABLET, EXTENDED RELEASE ORAL at 04:05

## 2017-05-25 RX ADMIN — SODIUM CHLORIDE, PRESERVATIVE FREE 10 ML: 5 INJECTION INTRAVENOUS at 11:05

## 2017-05-25 RX ADMIN — ACETAMINOPHEN 650 MG: 325 TABLET ORAL at 11:05

## 2017-05-25 RX ADMIN — VANCOMYCIN HYDROCHLORIDE 1250 MG: 1 INJECTION, POWDER, LYOPHILIZED, FOR SOLUTION INTRAVENOUS at 02:05

## 2017-05-25 RX ADMIN — ENOXAPARIN SODIUM 40 MG: 100 INJECTION SUBCUTANEOUS at 05:05

## 2017-05-25 RX ADMIN — ALUMINUM HYDROXIDE, MAGNESIUM HYDROXIDE, AND SIMETHICONE: 200; 200; 20 SUSPENSION ORAL at 05:05

## 2017-05-25 RX ADMIN — POLYETHYLENE GLYCOL 3350 17 G: 17 POWDER, FOR SOLUTION ORAL at 11:05

## 2017-05-25 RX ADMIN — DICYCLOMINE HYDROCHLORIDE 10 MG: 10 CAPSULE ORAL at 05:05

## 2017-05-25 RX ADMIN — CEFEPIME 1 G: 1 INJECTION, POWDER, FOR SOLUTION INTRAMUSCULAR; INTRAVENOUS at 12:05

## 2017-05-25 RX ADMIN — METRONIDAZOLE 500 MG: 500 INJECTION, SOLUTION INTRAVENOUS at 04:05

## 2017-05-25 RX ADMIN — SODIUM CHLORIDE, PRESERVATIVE FREE 10 ML: 5 INJECTION INTRAVENOUS at 05:05

## 2017-05-25 RX ADMIN — ACETAMINOPHEN 650 MG: 325 TABLET ORAL at 04:05

## 2017-05-25 RX ADMIN — SODIUM CHLORIDE, PRESERVATIVE FREE 10 ML: 5 INJECTION INTRAVENOUS at 12:05

## 2017-05-25 RX ADMIN — ACETAMINOPHEN 650 MG: 325 TABLET ORAL at 03:05

## 2017-05-25 NOTE — SUBJECTIVE & OBJECTIVE
Interval History: pt continues to be lethargic with abdominal pain (epigastric) reported. Elevated WBCs noted.  Flagyl initiated with IV Cefepime and Vanc continued.  Urine culture and blood culture with no growth x 2 days.  Suprapubic catheter replaced per Urologist.      Review of Systems   Constitutional: Positive for fatigue. Negative for chills, diaphoresis and fever.   HENT: Negative.  Negative for congestion, nosebleeds and sinus pressure.    Eyes: Negative.  Negative for photophobia, redness and visual disturbance.   Respiratory: Negative.  Negative for cough and shortness of breath.    Cardiovascular: Negative.  Negative for chest pain.   Gastrointestinal: Positive for abdominal pain. Negative for diarrhea, nausea and vomiting.   Endocrine: Negative.  Negative for polydipsia, polyphagia and polyuria.   Genitourinary: Negative for dysuria, flank pain, frequency and urgency.   Musculoskeletal: Negative.  Negative for back pain and neck stiffness.   Skin: Negative.  Negative for color change, pallor and rash.   Allergic/Immunologic: Negative.  Negative for environmental allergies, food allergies and immunocompromised state.   Neurological: Negative.  Negative for dizziness, syncope, speech difficulty, numbness and headaches.   Hematological: Negative.  Does not bruise/bleed easily.   Psychiatric/Behavioral: Positive for decreased concentration. Negative for confusion and hallucinations. The patient is not nervous/anxious.    All other systems reviewed and are negative.    Objective:     Vital Signs (Most Recent):  Temp: (!) 100.4 °F (38 °C) (05/25/17 1200)  Pulse: 95 (05/25/17 1200)  Resp: 20 (05/25/17 1200)  BP: (!) 166/90 (05/25/17 1200)  SpO2: 98 % (05/25/17 1200) Vital Signs (24h Range):  Temp:  [98.4 °F (36.9 °C)-100.4 °F (38 °C)] 100.4 °F (38 °C)  Pulse:  [] 95  Resp:  [20] 20  SpO2:  [96 %-99 %] 98 %  BP: (145-167)/() 166/90     Weight: 93 kg (205 lb)  Body mass index is 31.17  kg/m².    Intake/Output Summary (Last 24 hours) at 05/25/17 1612  Last data filed at 05/25/17 0600   Gross per 24 hour   Intake             2280 ml   Output             1475 ml   Net              805 ml      Physical Exam   Constitutional: She is oriented to person, place, and time. She appears lethargic. No distress.   Chronically ill appearing   HENT:   Head: Normocephalic and atraumatic.   Eyes: Conjunctivae and EOM are normal. No scleral icterus.   Neck: Normal range of motion. Neck supple. No JVD present. No tracheal deviation present. No thyromegaly present.   Cardiovascular: Regular rhythm, normal heart sounds and intact distal pulses.  Tachycardia present.    No murmur heard.  Pulmonary/Chest: Effort normal and breath sounds normal. No respiratory distress. She has no wheezes. She has no rales. She exhibits no tenderness.   Abdominal: Soft. Bowel sounds are normal. She exhibits no distension. There is tenderness (epigastric).   Suprapubic catheter in place   Musculoskeletal: Normal range of motion. She exhibits no edema or tenderness.   Lymphadenopathy:     She has no cervical adenopathy.   Neurological: She is oriented to person, place, and time. She appears lethargic. No cranial nerve deficit. She exhibits normal muscle tone. Coordination normal.   Skin: Skin is warm and dry. She is not diaphoretic. No erythema.   Psychiatric: She has a normal mood and affect.   Nursing note and vitals reviewed.      Significant Labs:   CBC:   Recent Labs  Lab 05/23/17 2150 05/24/17  0620 05/25/17  0450   WBC 28.31* 28.69* 32.36*   HGB 16.4* 15.9 14.7   HCT 47.7 46.5 44.8   * 384* 361*     CMP:   Recent Labs  Lab 05/23/17  2150 05/24/17  0620 05/25/17  0450    141 142   K 2.6* 3.1* 3.1*    108 110   CO2 24 22* 23   * 124* 121*   BUN 12 13 16   CREATININE 0.8 0.8 0.8   CALCIUM 10.0 8.8 8.8   PROT 8.4  --   --    ALBUMIN 4.1  --   --    BILITOT 1.1*  --   --    ALKPHOS 76  --   --    AST 22  --    --    ALT 14  --   --    ANIONGAP 12 11 9   EGFRNONAA >60 >60 >60       Significant Imaging:   Imaging Results          X-Ray Chest 1 View for PICC_Central line (Final result)  Result time 05/24/17 07:54:48    Final result by ALISHA Woodward Sr., MD (05/24/17 07:54:48)                 Impression:        1. There has been interval placement of a right PICC. The tip is in the superior vena cava.  2. The lungs are clear.   3. There is anterior and posterior spinal fusion hardware projected over the cervical spine.      Electronically signed by: ALISHA WOODWARD MD  Date:     05/24/17  Time:    07:54              Narrative:    One-view chest x-ray    Clinical History: Chest pain    Finding: Comparison was made to a prior examination performed on 5/23/2017. There has been interval placement of a right PICC. The tip is in the superior vena cava. The size of the heart is normal. The lungs are clear. There is no pneumothorax.  The costophrenic angles are sharp. There is anterior and posterior spinal fusion hardware projected over the cervical spine.                             CT Head Without Contrast (Final result)  Result time 05/23/17 22:14:50    Final result by Rhonda Barkley MD (05/23/17 22:14:50)                 Impression:         CT scan of the brain demonstrates no acute findings or significant interval change.  All CT scans at this facility use dose modulation, iterative reconstruction, and/or weight based dosing when appropriate to reduce radiation dose to as low as reasonable achievable.      Electronically signed by: RHONDA BARKLEY MD  Date:     05/23/17  Time:    22:14              Narrative:    CT HEAD WITHOUT CONTRAST    Date: 05/23/17 21:58:42    Clinical indication:  syncope     Technique:  Standard noncontrast CT scan of the brain. Comparison is made to previous study of 07/30/2015.     Findings:  The ventricles are nondilated. Gray and white matter structures reveal normal attenuation. No hemorrhage, mass  effect or edema is identified.  There is atherosclerosis of intracranial vessels.  The skull and skull base are unremarkable.

## 2017-05-25 NOTE — PLAN OF CARE
Problem: Patient Care Overview  Goal: Plan of Care Review  Outcome: Ongoing (interventions implemented as appropriate)  Patient currently resting quietly in bed. Patient awake, alert, oriented x3. Patient disoriented to time. VS currently stable. Patient NSR on monitor at this time. Patient currently receiving NS @ 125mL/hr. Fall prevention reviewed with patient. Patient educated and encouraged to use the call light for any needs. Patient call light within reach. Patient free of falls. Patient complained of abdominal pain earlier in shift which was relieved by prn tylenol. Patient has no complaints of pain at this time. Patient encouraged to turn in bed every 2 hours to prevent skin breakdown. Patient turns with on assist. Patient currently refusing to turn in bed. No sign of pressure ulcer development during shift noted. Plan of care reviewed with patient. Patient has no further questions about plan of care at this time. Will continue to monitor.

## 2017-05-25 NOTE — ASSESSMENT & PLAN NOTE
- UTI due to chronic suprapubic catheter in place  - suprapubic catheter changed per Urology  - Rocephin IV changed to Vanc and Cefepime  - Urine culture pending  - blood culture with NGTD

## 2017-05-25 NOTE — ASSESSMENT & PLAN NOTE
- Unclear etiology, but likely suspect sepsis induced  - serial cardiac enzymes with downward trend noted  - Patient denies chest pain or SOB but reports epigastric pain   - EKG showed ST  -Echo showed EF 55% with diastolic dysfunction  - Lovenox 1 mg/kg in ED

## 2017-05-25 NOTE — PLAN OF CARE
Problem: Patient Care Overview  Goal: Plan of Care Review  Outcome: Ongoing (interventions implemented as appropriate)  Patient remains free from falls, fall precaution in place. Assist x1. Bed rest. Pt refused to turn q2h. IV fluids infusing.   VS stable. C.o pain to abdomen. MD and NP notified, no pain med ordered. Supapubic catheter intact, to gravity.  No other C/O at this time.Call bell and belongings within reach, reminded to call for assistance.

## 2017-05-25 NOTE — PROGRESS NOTES
[facility] Hospital Medicine  Progress Note    Patient Name: Desirae No  MRN: [id number]  Patient Class: IP- Inpatient   Admission Date: 5/23/2017  Length of Stay: 2 days  Attending Physician: Ellis Cruz MD  Primary Care Provider: Fauzia Muro MD        Subjective:     Principal Problem:Sepsis    HPI:  Ms. No is a 67 y/o  female with h/o neurogenic bladder, has chronically in-dwelling suprapubic catheter, presented to the ED for second time since this mroning for worsening confusion. Last night she presented with abdominal pain, negative CT abdomen, WBC was 18,000, UA was abnormal and discharged from ED on cefuroxime orally. However over the course of the day, she progressively got more confused and was brought back to the ED. WBC increased to 28,000, troponin increased to 0.111 from 0.024. Denies chest pain or SOB. She received one dose of Lovenox 1 mg/kg in the ED.    Hospital Course:  Pt admitted to Telemetry Unit for Sepsis likely due to UTI.  Pt treated with IV antibiotics with blood culture and urine culture sent for analysis.  WBC elevated.  Chest xray unremarkable. CT of Abdomen showed no acute findings.  Flagyl added to antibiotic regime.  Urology consulted and suprapubic catheter changed.  Pt mentation has improved however she continues to be lethargic. Will continue to follow repeat lab and culture results.      Interval History: pt continues to be lethargic with abdominal pain (epigastric) reported. Elevated WBCs noted.  Flagyl initiated with IV Cefepime and Vanc continued.  Urine culture and blood culture with no growth x 2 days.  Suprapubic catheter replaced per Urologist.      Review of Systems   Constitutional: Positive for fatigue. Negative for chills, diaphoresis and fever.   HENT: Negative.  Negative for congestion, nosebleeds and sinus pressure.    Eyes: Negative.  Negative for photophobia, redness and visual disturbance.   Respiratory:  Negative.  Negative for cough and shortness of breath.    Cardiovascular: Negative.  Negative for chest pain.   Gastrointestinal: Positive for abdominal pain. Negative for diarrhea, nausea and vomiting.   Endocrine: Negative.  Negative for polydipsia, polyphagia and polyuria.   Genitourinary: Negative for dysuria, flank pain, frequency and urgency.   Musculoskeletal: Negative.  Negative for back pain and neck stiffness.   Skin: Negative.  Negative for color change, pallor and rash.   Allergic/Immunologic: Negative.  Negative for environmental allergies, food allergies and immunocompromised state.   Neurological: Negative.  Negative for dizziness, syncope, speech difficulty, numbness and headaches.   Hematological: Negative.  Does not bruise/bleed easily.   Psychiatric/Behavioral: Positive for decreased concentration. Negative for confusion and hallucinations. The patient is not nervous/anxious.    All other systems reviewed and are negative.    Objective:     Vital Signs (Most Recent):  Temp: (!) 100.4 °F (38 °C) (05/25/17 1200)  Pulse: 95 (05/25/17 1200)  Resp: 20 (05/25/17 1200)  BP: (!) 166/90 (05/25/17 1200)  SpO2: 98 % (05/25/17 1200) Vital Signs (24h Range):  Temp:  [98.4 °F (36.9 °C)-100.4 °F (38 °C)] 100.4 °F (38 °C)  Pulse:  [] 95  Resp:  [20] 20  SpO2:  [96 %-99 %] 98 %  BP: (145-167)/() 166/90     Weight: 93 kg (205 lb)  Body mass index is 31.17 kg/m².    Intake/Output Summary (Last 24 hours) at 05/25/17 1612  Last data filed at 05/25/17 0600   Gross per 24 hour   Intake             2280 ml   Output             1475 ml   Net              805 ml      Physical Exam   Constitutional: She is oriented to person, place, and time. She appears lethargic. No distress.   Chronically ill appearing   HENT:   Head: Normocephalic and atraumatic.   Eyes: Conjunctivae and EOM are normal. No scleral icterus.   Neck: Normal range of motion. Neck supple. No JVD present. No tracheal deviation present. No  thyromegaly present.   Cardiovascular: Regular rhythm, normal heart sounds and intact distal pulses.  Tachycardia present.    No murmur heard.  Pulmonary/Chest: Effort normal and breath sounds normal. No respiratory distress. She has no wheezes. She has no rales. She exhibits no tenderness.   Abdominal: Soft. Bowel sounds are normal. She exhibits no distension. There is tenderness (epigastric).   Suprapubic catheter in place   Musculoskeletal: Normal range of motion. She exhibits no edema or tenderness.   Lymphadenopathy:     She has no cervical adenopathy.   Neurological: She is oriented to person, place, and time. She appears lethargic. No cranial nerve deficit. She exhibits normal muscle tone. Coordination normal.   Skin: Skin is warm and dry. She is not diaphoretic. No erythema.   Psychiatric: She has a normal mood and affect.   Nursing note and vitals reviewed.      Significant Labs:   CBC:   Recent Labs  Lab 05/23/17 2150 05/24/17 0620 05/25/17  0450   WBC 28.31* 28.69* 32.36*   HGB 16.4* 15.9 14.7   HCT 47.7 46.5 44.8   * 384* 361*     CMP:   Recent Labs  Lab 05/23/17 2150 05/24/17 0620 05/25/17  0450    141 142   K 2.6* 3.1* 3.1*    108 110   CO2 24 22* 23   * 124* 121*   BUN 12 13 16   CREATININE 0.8 0.8 0.8   CALCIUM 10.0 8.8 8.8   PROT 8.4  --   --    ALBUMIN 4.1  --   --    BILITOT 1.1*  --   --    ALKPHOS 76  --   --    AST 22  --   --    ALT 14  --   --    ANIONGAP 12 11 9   EGFRNONAA >60 >60 >60       Significant Imaging:   Imaging Results          X-Ray Chest 1 View for PICC_Central line (Final result)  Result time 05/24/17 07:54:48    Final result by ALISHA Woodward Sr., MD (05/24/17 07:54:48)                 Impression:        1. There has been interval placement of a right PICC. The tip is in the superior vena cava.  2. The lungs are clear.   3. There is anterior and posterior spinal fusion hardware projected over the cervical spine.      Electronically signed by: ALISHA  COLLINS SALDANA  Date:     05/24/17  Time:    07:54              Narrative:    One-view chest x-ray    Clinical History: Chest pain    Finding: Comparison was made to a prior examination performed on 5/23/2017. There has been interval placement of a right PICC. The tip is in the superior vena cava. The size of the heart is normal. The lungs are clear. There is no pneumothorax.  The costophrenic angles are sharp. There is anterior and posterior spinal fusion hardware projected over the cervical spine.                             CT Head Without Contrast (Final result)  Result time 05/23/17 22:14:50    Final result by Rhonda Barkley MD (05/23/17 22:14:50)                 Impression:         CT scan of the brain demonstrates no acute findings or significant interval change.  All CT scans at this facility use dose modulation, iterative reconstruction, and/or weight based dosing when appropriate to reduce radiation dose to as low as reasonable achievable.      Electronically signed by: RHONDA BARKLEY MD  Date:     05/23/17  Time:    22:14              Narrative:    CT HEAD WITHOUT CONTRAST    Date: 05/23/17 21:58:42    Clinical indication:  syncope     Technique:  Standard noncontrast CT scan of the brain. Comparison is made to previous study of 07/30/2015.     Findings:  The ventricles are nondilated. Gray and white matter structures reveal normal attenuation. No hemorrhage, mass effect or edema is identified.  There is atherosclerosis of intracranial vessels.  The skull and skull base are unremarkable.                            Assessment/Plan:      Hypokalemia    - Replace  -will continue to monitor  -repeat CMP in am           Acute encephalopathy    - likely due to UTI  -improved  -neuro checks  - CT head unremarkable          Elevated troponin    - Unclear etiology, but likely suspect sepsis induced  - serial cardiac enzymes with downward trend noted  - Patient denies chest pain or SOB but reports epigastric pain    - EKG showed ST  -Echo showed EF 55% with diastolic dysfunction  - Lovenox 1 mg/kg in ED          Urinary tract infection associated with catheterization of urinary tract    - UTI due to chronic suprapubic catheter in place  - suprapubic catheter changed per Urology  - Rocephin IV changed to Vanc and Cefepime  - Urine culture pending  - blood culture with NGTD          * Sepsis    - Source likely UTI  - WBC remained elevated  -IV antibiotics changed to Vanc,Cefepime, and Flagyl  - Blood and urine culture results with NGTD  - IV fluids  -CT of abdomen showed no acute abnormality identified in the abdomen or pelvis  - chest xray repeated on 5/24/17-unremarkable            VTE Risk Mitigation         Ordered     enoxaparin injection 40 mg  Daily     Route:  Subcutaneous        05/23/17 2322     Place sequential compression device  Until discontinued      05/24/17 0438     Medium Risk of VTE  Once      05/24/17 0023          Aleisha Saenz NP  Department of Hospital Medicine   [facility]

## 2017-05-25 NOTE — HOSPITAL COURSE
Pt admitted to Telemetry Unit for Sepsis likely due to UTI.  Pt treated with IV antibiotics with blood culture and urine culture sent for analysis.  WBC trending down.  Chest xray unremarkable. CT of Abdomen showed no acute findings.  Flagyl added to antibiotic regime.  Urology consulted and suprapubic catheter changed.  Potassium repleted as needed. Stool for C-diff negative. Blood and Stool cultures show no growth to date. WBCs trending downward. X-ray of abdomen revealed nonspecific bowel gas pattern. PT/OT evaluation ordered.Symptoms improved.  Patient seen and examined on date of discharge and found suitable for discharge. Patient will be discharged with 5 days of Cipro and Flagyl by mouth. Patient will follow-up with PCP in 3 days.

## 2017-05-25 NOTE — CONSULTS
Chief Complaint: Neurogenic Bladder    HPI:   5/25/17: 65 yo pt of Dr. Carrasco for neurogenic bladder managed with SP tube changed every two weeks as an outpatient was admitted acutely ill with sepsis and presumed UTI.  A new SP tube was placed.  Pt having sig abd pain and discomfort, appears ill, speaks slowly.  No exac/rel factors.  No gross hematuria.  No reports of problems with SP tube.  Catheter clear and productive.  CT abd/pelvis no acute abnormalities.  Blood cultures negative.  Urine culture in progress.    Allergies:  Nucynta [tapentadol]; Prochlorperazine; Stadol [butorphanol tartrate]; Morpholine analogues; Reglan [metoclopramide hcl]; Toradol [ketorolac]; and Zofran [ondansetron hcl (pf)]    Medications: see MAR    Review of Systems:  General: No fever, chills, or weight loss recently. Fatigued.  Skin: No rashes, itching, or changes in color or texture of skin.  Chest: Denies JACKMAN, cyanosis, wheezing, cough, and sputum production.  Abdomen: Appetite poor. No weight loss. Denies diarrhea, abdominal pain, hematemesis, or blood in stool.  Musculoskeletal: Some joint stiffness or swelling. Some back pain.  : As above.  All other review of systems negative.    PMH:   has a past medical history of DDD (degenerative disc disease), lumbar (4/11/2014); Hypertension; Neurogenic bladder; and Pulmonary embolism (4/11/2014).    PSH:   has a past surgical history that includes Cholecystectomy; Joint replacement; Cervical fusion; neck fusion; and Hysterectomy (1974).    FamHx: family history includes Stroke in her father.    SocHx:  reports that she has never smoked. She has never used smokeless tobacco. She reports that she does not drink alcohol or use drugs.     Physical Exam:  Vitals:   Vitals:    05/25/17 0506   BP: (!) 145/85   Pulse: 101   Resp: 20   Temp: 98.6 °F (37 °C)     General: A&Ox3. No apparent distress. Sickly appearing. No deformities.  Neck: No masses. Normal thyroid.  Lungs: normal inspiration.  No use of accessory muscles.  Heart: normal pulse. No arrhythmias.  Abdomen: Soft. NT. ND. No masses. No hernias. No hepatosplenomegaly.  Lymphatic: Neck and groin nodes negative.  Skin: The skin is warm and dry. No jaundice.  Ext: No c/c/e.  : SP tube productive in good position    Labs/Studies:  See chart    Impression/Plan:   1. Pt should be treated with appropriate Abx for UTI/sepsis and referred back to Dr. Carrasco for outpatient followup.  No new reccs.

## 2017-05-25 NOTE — ASSESSMENT & PLAN NOTE
- Source likely UTI  - WBC remained elevated  -IV antibiotics changed to Vanc,Cefepime, and Flagyl  - Blood and urine culture results with NGTD  - IV fluids  -CT of abdomen showed no acute abnormality identified in the abdomen or pelvis  - chest xray repeated on 5/24/17-unremarkable

## 2017-05-26 LAB
ANION GAP SERPL CALC-SCNC: 7 MMOL/L
BASOPHILS # BLD AUTO: 0.01 K/UL
BASOPHILS NFR BLD: 0 %
BUN SERPL-MCNC: 12 MG/DL
CALCIUM SERPL-MCNC: 7.7 MG/DL
CHLORIDE SERPL-SCNC: 114 MMOL/L
CO2 SERPL-SCNC: 20 MMOL/L
CREAT SERPL-MCNC: 0.7 MG/DL
DIFFERENTIAL METHOD: ABNORMAL
EOSINOPHIL # BLD AUTO: 0 K/UL
EOSINOPHIL NFR BLD: 0.1 %
ERYTHROCYTE [DISTWIDTH] IN BLOOD BY AUTOMATED COUNT: 14.7 %
EST. GFR  (AFRICAN AMERICAN): >60 ML/MIN/1.73 M^2
EST. GFR  (NON AFRICAN AMERICAN): >60 ML/MIN/1.73 M^2
GLUCOSE SERPL-MCNC: 103 MG/DL
HCT VFR BLD AUTO: 38.1 %
HGB BLD-MCNC: 12.5 G/DL
LYMPHOCYTES # BLD AUTO: 3.1 K/UL
LYMPHOCYTES NFR BLD: 13.8 %
MCH RBC QN AUTO: 29.5 PG
MCHC RBC AUTO-ENTMCNC: 32.8 %
MCV RBC AUTO: 90 FL
MONOCYTES # BLD AUTO: 1.6 K/UL
MONOCYTES NFR BLD: 7.1 %
NEUTROPHILS # BLD AUTO: 17.7 K/UL
NEUTROPHILS NFR BLD: 79 %
PLATELET # BLD AUTO: 289 K/UL
PMV BLD AUTO: 9.5 FL
POTASSIUM SERPL-SCNC: 2.9 MMOL/L
RBC # BLD AUTO: 4.24 M/UL
SODIUM SERPL-SCNC: 141 MMOL/L
VANCOMYCIN TROUGH SERPL-MCNC: 19.6 UG/ML
WBC # BLD AUTO: 22.42 K/UL

## 2017-05-26 PROCEDURE — 80202 ASSAY OF VANCOMYCIN: CPT

## 2017-05-26 PROCEDURE — 25000003 PHARM REV CODE 250: Performed by: INTERNAL MEDICINE

## 2017-05-26 PROCEDURE — 25000003 PHARM REV CODE 250: Performed by: NURSE PRACTITIONER

## 2017-05-26 PROCEDURE — 21400001 HC TELEMETRY ROOM

## 2017-05-26 PROCEDURE — 80048 BASIC METABOLIC PNL TOTAL CA: CPT

## 2017-05-26 PROCEDURE — 63600175 PHARM REV CODE 636 W HCPCS: Performed by: INTERNAL MEDICINE

## 2017-05-26 PROCEDURE — 63600175 PHARM REV CODE 636 W HCPCS: Performed by: NURSE PRACTITIONER

## 2017-05-26 PROCEDURE — 25000003 PHARM REV CODE 250: Performed by: EMERGENCY MEDICINE

## 2017-05-26 PROCEDURE — 85025 COMPLETE CBC W/AUTO DIFF WBC: CPT

## 2017-05-26 PROCEDURE — 63600175 PHARM REV CODE 636 W HCPCS: Performed by: EMERGENCY MEDICINE

## 2017-05-26 RX ORDER — POTASSIUM CHLORIDE 20 MEQ/1
60 TABLET, EXTENDED RELEASE ORAL ONCE
Status: COMPLETED | OUTPATIENT
Start: 2017-05-26 | End: 2017-05-26

## 2017-05-26 RX ORDER — OXYCODONE HYDROCHLORIDE 5 MG/1
5 TABLET ORAL EVERY 6 HOURS PRN
Status: DISCONTINUED | OUTPATIENT
Start: 2017-05-26 | End: 2017-05-27

## 2017-05-26 RX ADMIN — METRONIDAZOLE 500 MG: 500 INJECTION, SOLUTION INTRAVENOUS at 06:05

## 2017-05-26 RX ADMIN — DICYCLOMINE HYDROCHLORIDE 10 MG: 10 CAPSULE ORAL at 12:05

## 2017-05-26 RX ADMIN — OXYCODONE HYDROCHLORIDE 5 MG: 5 TABLET ORAL at 12:05

## 2017-05-26 RX ADMIN — OXYCODONE HYDROCHLORIDE 5 MG: 5 TABLET ORAL at 06:05

## 2017-05-26 RX ADMIN — CEFEPIME 1 G: 1 INJECTION, POWDER, FOR SOLUTION INTRAMUSCULAR; INTRAVENOUS at 12:05

## 2017-05-26 RX ADMIN — ACETAMINOPHEN 650 MG: 325 TABLET ORAL at 01:05

## 2017-05-26 RX ADMIN — DICYCLOMINE HYDROCHLORIDE 10 MG: 10 CAPSULE ORAL at 06:05

## 2017-05-26 RX ADMIN — METRONIDAZOLE 500 MG: 500 INJECTION, SOLUTION INTRAVENOUS at 03:05

## 2017-05-26 RX ADMIN — ACETAMINOPHEN 650 MG: 325 TABLET ORAL at 08:05

## 2017-05-26 RX ADMIN — POTASSIUM CHLORIDE 60 MEQ: 1500 TABLET, EXTENDED RELEASE ORAL at 03:05

## 2017-05-26 RX ADMIN — SODIUM CHLORIDE, PRESERVATIVE FREE 10 ML: 5 INJECTION INTRAVENOUS at 06:05

## 2017-05-26 RX ADMIN — POLYETHYLENE GLYCOL 3350 17 G: 17 POWDER, FOR SOLUTION ORAL at 08:05

## 2017-05-26 RX ADMIN — CEFEPIME 1 G: 1 INJECTION, POWDER, FOR SOLUTION INTRAMUSCULAR; INTRAVENOUS at 01:05

## 2017-05-26 RX ADMIN — SODIUM CHLORIDE, PRESERVATIVE FREE 10 ML: 5 INJECTION INTRAVENOUS at 05:05

## 2017-05-26 RX ADMIN — ENOXAPARIN SODIUM 40 MG: 100 INJECTION SUBCUTANEOUS at 05:05

## 2017-05-26 RX ADMIN — VANCOMYCIN HYDROCHLORIDE 1250 MG: 1 INJECTION, POWDER, LYOPHILIZED, FOR SOLUTION INTRAVENOUS at 05:05

## 2017-05-26 RX ADMIN — VANCOMYCIN HYDROCHLORIDE 1250 MG: 1 INJECTION, POWDER, LYOPHILIZED, FOR SOLUTION INTRAVENOUS at 03:05

## 2017-05-26 RX ADMIN — SODIUM CHLORIDE, PRESERVATIVE FREE 10 ML: 5 INJECTION INTRAVENOUS at 12:05

## 2017-05-26 RX ADMIN — DICYCLOMINE HYDROCHLORIDE 10 MG: 10 CAPSULE ORAL at 05:05

## 2017-05-26 NOTE — PLAN OF CARE
Problem: Patient Care Overview  Goal: Plan of Care Review  Outcome: Ongoing (interventions implemented as appropriate)  Remains free of injuries/falls  Pt incontinent of bowel/bladder  Turn Q 2hrs in progress  Afebrile at present time  Confused intermittently  Suprapubic catheter intact  Safety and comfort measures maintained  Will cont to monitor

## 2017-05-26 NOTE — PROGRESS NOTES
Report received assumed care, Awake alert oriented to person, place, reoriented to time, assessment per flowsheet, safety and comfort measures maintained. Call bell within reach, will cont to monitor

## 2017-05-26 NOTE — ASSESSMENT & PLAN NOTE
- Source likely UTI  - WBC trending downward  -IV antibiotics changed to Vanc,Cefepime, and Flagyl  - Blood and urine culture results with NGTD  - IV fluids  -CT of abdomen showed no acute abnormality identified in the abdomen or pelvis  - chest xray repeated on 5/24/17-unremarkable

## 2017-05-26 NOTE — SUBJECTIVE & OBJECTIVE
Interval History: pt more alert today.  Pt continues to report abdominal pain with home medication resumed.  PT/OT ordered.  Potassium repleted.  WBCs trended downward.  Current plan of care discussed with patient.      Review of Systems   Constitutional: Positive for fatigue. Negative for chills, diaphoresis and fever.   HENT: Negative.  Negative for congestion, nosebleeds and sinus pressure.    Eyes: Negative.  Negative for photophobia, redness and visual disturbance.   Respiratory: Negative.  Negative for cough and shortness of breath.    Cardiovascular: Negative.  Negative for chest pain.   Gastrointestinal: Positive for abdominal pain. Negative for diarrhea, nausea and vomiting.   Endocrine: Negative.  Negative for polydipsia, polyphagia and polyuria.   Genitourinary: Negative for dysuria, flank pain, frequency and urgency.   Musculoskeletal: Negative.  Negative for back pain and neck stiffness.   Skin: Negative.  Negative for color change, pallor and rash.   Allergic/Immunologic: Negative.  Negative for environmental allergies, food allergies and immunocompromised state.   Neurological: Negative.  Negative for dizziness, syncope, speech difficulty, numbness and headaches.   Hematological: Negative.  Does not bruise/bleed easily.   Psychiatric/Behavioral: Negative for confusion, decreased concentration and hallucinations. The patient is not nervous/anxious.    All other systems reviewed and are negative.    Objective:     Vital Signs (Most Recent):  Temp: 98.8 °F (37.1 °C) (05/26/17 1200)  Pulse: 92 (05/26/17 1200)  Resp: 18 (05/26/17 0755)  BP: (!) 165/86 (05/26/17 1200)  SpO2: 97 % (05/26/17 1200) Vital Signs (24h Range):  Temp:  [98.8 °F (37.1 °C)-99.2 °F (37.3 °C)] 98.8 °F (37.1 °C)  Pulse:  [] 92  Resp:  [18-20] 18  SpO2:  [96 %-100 %] 97 %  BP: (157-177)/(79-98) 165/86     Weight: 93 kg (205 lb)  Body mass index is 31.17 kg/m².    Intake/Output Summary (Last 24 hours) at 05/26/17 2249  Last data  filed at 05/26/17 0436   Gross per 24 hour   Intake             3190 ml   Output              975 ml   Net             2215 ml      Physical Exam   Constitutional: She is oriented to person, place, and time. No distress.   Chronically ill appearing   HENT:   Head: Normocephalic and atraumatic.   Eyes: Conjunctivae and EOM are normal. No scleral icterus.   Neck: Normal range of motion. Neck supple. No JVD present. No tracheal deviation present. No thyromegaly present.   Cardiovascular: Normal rate, regular rhythm, normal heart sounds and intact distal pulses.    No murmur heard.  Pulmonary/Chest: Effort normal and breath sounds normal. No respiratory distress. She has no wheezes. She has no rales. She exhibits no tenderness.   Abdominal: Soft. Bowel sounds are normal. She exhibits no distension. There is tenderness (epigastric).   Suprapubic catheter in place   Musculoskeletal: Normal range of motion. She exhibits no edema or tenderness.   Lymphadenopathy:     She has no cervical adenopathy.   Neurological: She is alert and oriented to person, place, and time. No cranial nerve deficit. She exhibits normal muscle tone. Coordination normal.   Skin: Skin is warm and dry. She is not diaphoretic. No erythema.   Psychiatric: She has a normal mood and affect.   Nursing note and vitals reviewed.      Significant Labs:   CBC:   Recent Labs  Lab 05/25/17 0450 05/26/17 0524   WBC 32.36* 22.42*   HGB 14.7 12.5   HCT 44.8 38.1   * 289     CMP:   Recent Labs  Lab 05/25/17 0450 05/26/17  0524    141   K 3.1* 2.9*    114*   CO2 23 20*   * 103   BUN 16 12   CREATININE 0.8 0.7   CALCIUM 8.8 7.7*   ANIONGAP 9 7*   EGFRNONAA >60 >60       Significant Imaging:   Imaging Results          X-Ray Chest 1 View for PICC_Central line (Final result)  Result time 05/24/17 07:54:48    Final result by ALISHA Woodward Sr., MD (05/24/17 07:54:48)                 Impression:        1. There has been interval placement of  a right PICC. The tip is in the superior vena cava.  2. The lungs are clear.   3. There is anterior and posterior spinal fusion hardware projected over the cervical spine.      Electronically signed by: ALISHA GUADALUPE MD  Date:     05/24/17  Time:    07:54              Narrative:    One-view chest x-ray    Clinical History: Chest pain    Finding: Comparison was made to a prior examination performed on 5/23/2017. There has been interval placement of a right PICC. The tip is in the superior vena cava. The size of the heart is normal. The lungs are clear. There is no pneumothorax.  The costophrenic angles are sharp. There is anterior and posterior spinal fusion hardware projected over the cervical spine.                             CT Head Without Contrast (Final result)  Result time 05/23/17 22:14:50    Final result by Rhonda Barkley MD (05/23/17 22:14:50)                 Impression:         CT scan of the brain demonstrates no acute findings or significant interval change.  All CT scans at this facility use dose modulation, iterative reconstruction, and/or weight based dosing when appropriate to reduce radiation dose to as low as reasonable achievable.      Electronically signed by: RHONDA BARKLEY MD  Date:     05/23/17  Time:    22:14              Narrative:    CT HEAD WITHOUT CONTRAST    Date: 05/23/17 21:58:42    Clinical indication:  syncope     Technique:  Standard noncontrast CT scan of the brain. Comparison is made to previous study of 07/30/2015.     Findings:  The ventricles are nondilated. Gray and white matter structures reveal normal attenuation. No hemorrhage, mass effect or edema is identified.  There is atherosclerosis of intracranial vessels.  The skull and skull base are unremarkable.

## 2017-05-26 NOTE — PROGRESS NOTES
Problem: Patient Care Overview  Goal: Plan of Care Review  Outcome: Ongoing (interventions implemented as appropriate)  Pt is without falls or injury this shift. Spouse at bedside. Oxycodone IR given x 1 for back pain. IV abx per MAR. Pt refuses turning at times. Education given.  Bed low, bed alarm on and call light within reach.

## 2017-05-26 NOTE — CONSULTS
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   Next trough due Sat 5/27 @ 1430  Note: pt received contrast with CT of abdomen 5/24     Thank you for allowing us to participate in this patient's care.   Ellis Montemayor, Pharm.D. 5/26/2017 7:52 AM

## 2017-05-26 NOTE — PROGRESS NOTES
[facility] Hospital Medicine  Progress Note    Patient Name: Desirae No  MRN: [id number]  Patient Class: IP- Inpatient   Admission Date: 5/23/2017  Length of Stay: 3 days  Attending Physician: Ellis Cruz MD  Primary Care Provider: Fauzia Muro MD        Subjective:     Principal Problem:Sepsis    HPI:  Ms. No is a 67 y/o  female with h/o neurogenic bladder, has chronically in-dwelling suprapubic catheter, presented to the ED for second time since this mroning for worsening confusion. Last night she presented with abdominal pain, negative CT abdomen, WBC was 18,000, UA was abnormal and discharged from ED on cefuroxime orally. However over the course of the day, she progressively got more confused and was brought back to the ED. WBC increased to 28,000, troponin increased to 0.111 from 0.024. Denies chest pain or SOB. She received one dose of Lovenox 1 mg/kg in the ED.    Hospital Course:  Pt admitted to Telemetry Unit for Sepsis likely due to UTI.  Pt treated with IV antibiotics with blood culture and urine culture sent for analysis.  WBC elevated.  Chest xray unremarkable. CT of Abdomen showed no acute findings.  Flagyl added to antibiotic regime.  Urology consulted and suprapubic catheter changed.  Potassium repleted as needed.  Pt mentation has improved however she continues to be lethargic. Will continue to follow repeat lab and culture results.  WBCs trending downward.  Pain improved with analgesia prn.  PT/OT evaluation ordered.        Interval History: pt more alert today.  Pt continues to report abdominal pain with home medication resumed.  PT/OT ordered.  Potassium repleted.  WBCs trended downward.  Current plan of care discussed with patient.      Review of Systems   Constitutional: Positive for fatigue. Negative for chills, diaphoresis and fever.   HENT: Negative.  Negative for congestion, nosebleeds and sinus pressure.    Eyes: Negative.  Negative for  photophobia, redness and visual disturbance.   Respiratory: Negative.  Negative for cough and shortness of breath.    Cardiovascular: Negative.  Negative for chest pain.   Gastrointestinal: Positive for abdominal pain. Negative for diarrhea, nausea and vomiting.   Endocrine: Negative.  Negative for polydipsia, polyphagia and polyuria.   Genitourinary: Negative for dysuria, flank pain, frequency and urgency.   Musculoskeletal: Negative.  Negative for back pain and neck stiffness.   Skin: Negative.  Negative for color change, pallor and rash.   Allergic/Immunologic: Negative.  Negative for environmental allergies, food allergies and immunocompromised state.   Neurological: Negative.  Negative for dizziness, syncope, speech difficulty, numbness and headaches.   Hematological: Negative.  Does not bruise/bleed easily.   Psychiatric/Behavioral: Negative for confusion, decreased concentration and hallucinations. The patient is not nervous/anxious.    All other systems reviewed and are negative.    Objective:     Vital Signs (Most Recent):  Temp: 98.8 °F (37.1 °C) (05/26/17 1200)  Pulse: 92 (05/26/17 1200)  Resp: 18 (05/26/17 0755)  BP: (!) 165/86 (05/26/17 1200)  SpO2: 97 % (05/26/17 1200) Vital Signs (24h Range):  Temp:  [98.8 °F (37.1 °C)-99.2 °F (37.3 °C)] 98.8 °F (37.1 °C)  Pulse:  [] 92  Resp:  [18-20] 18  SpO2:  [96 %-100 %] 97 %  BP: (157-177)/(79-98) 165/86     Weight: 93 kg (205 lb)  Body mass index is 31.17 kg/m².    Intake/Output Summary (Last 24 hours) at 05/26/17 1518  Last data filed at 05/26/17 0436   Gross per 24 hour   Intake             3190 ml   Output              975 ml   Net             2215 ml      Physical Exam   Constitutional: She is oriented to person, place, and time. No distress.   Chronically ill appearing   HENT:   Head: Normocephalic and atraumatic.   Eyes: Conjunctivae and EOM are normal. No scleral icterus.   Neck: Normal range of motion. Neck supple. No JVD present. No tracheal  deviation present. No thyromegaly present.   Cardiovascular: Normal rate, regular rhythm, normal heart sounds and intact distal pulses.    No murmur heard.  Pulmonary/Chest: Effort normal and breath sounds normal. No respiratory distress. She has no wheezes. She has no rales. She exhibits no tenderness.   Abdominal: Soft. Bowel sounds are normal. She exhibits no distension. There is tenderness (epigastric).   Suprapubic catheter in place   Musculoskeletal: Normal range of motion. She exhibits no edema or tenderness.   Lymphadenopathy:     She has no cervical adenopathy.   Neurological: She is alert and oriented to person, place, and time. No cranial nerve deficit. She exhibits normal muscle tone. Coordination normal.   Skin: Skin is warm and dry. She is not diaphoretic. No erythema.   Psychiatric: She has a normal mood and affect.   Nursing note and vitals reviewed.      Significant Labs:   CBC:   Recent Labs  Lab 05/25/17 0450 05/26/17 0524   WBC 32.36* 22.42*   HGB 14.7 12.5   HCT 44.8 38.1   * 289     CMP:   Recent Labs  Lab 05/25/17 0450 05/26/17 0524    141   K 3.1* 2.9*    114*   CO2 23 20*   * 103   BUN 16 12   CREATININE 0.8 0.7   CALCIUM 8.8 7.7*   ANIONGAP 9 7*   EGFRNONAA >60 >60       Significant Imaging:   Imaging Results          X-Ray Chest 1 View for PICC_Central line (Final result)  Result time 05/24/17 07:54:48    Final result by ALISHA Woodward Sr., MD (05/24/17 07:54:48)                 Impression:        1. There has been interval placement of a right PICC. The tip is in the superior vena cava.  2. The lungs are clear.   3. There is anterior and posterior spinal fusion hardware projected over the cervical spine.      Electronically signed by: ALISHA WOODWARD MD  Date:     05/24/17  Time:    07:54              Narrative:    One-view chest x-ray    Clinical History: Chest pain    Finding: Comparison was made to a prior examination performed on 5/23/2017. There has been  interval placement of a right PICC. The tip is in the superior vena cava. The size of the heart is normal. The lungs are clear. There is no pneumothorax.  The costophrenic angles are sharp. There is anterior and posterior spinal fusion hardware projected over the cervical spine.                             CT Head Without Contrast (Final result)  Result time 05/23/17 22:14:50    Final result by Rhonda Barkley MD (05/23/17 22:14:50)                 Impression:         CT scan of the brain demonstrates no acute findings or significant interval change.  All CT scans at this facility use dose modulation, iterative reconstruction, and/or weight based dosing when appropriate to reduce radiation dose to as low as reasonable achievable.      Electronically signed by: RHONDA BARKLEY MD  Date:     05/23/17  Time:    22:14              Narrative:    CT HEAD WITHOUT CONTRAST    Date: 05/23/17 21:58:42    Clinical indication:  syncope     Technique:  Standard noncontrast CT scan of the brain. Comparison is made to previous study of 07/30/2015.     Findings:  The ventricles are nondilated. Gray and white matter structures reveal normal attenuation. No hemorrhage, mass effect or edema is identified.  There is atherosclerosis of intracranial vessels.  The skull and skull base are unremarkable.                            Assessment/Plan:      Hypokalemia    - Replace  -will continue to monitor  -repeat CMP in am           Acute encephalopathy    - likely due to UTI  -improved  -neuro checks  - CT head unremarkable          Elevated troponin    - Unclear etiology, but likely suspect sepsis induced  - serial cardiac enzymes with downward trend noted  - Patient denies chest pain or SOB but reports epigastric pain   - EKG showed ST  -Echo showed EF 55% with diastolic dysfunction  - Lovenox 1 mg/kg in ED          Urinary tract infection associated with catheterization of urinary tract    - UTI due to chronic suprapubic catheter in  place  - suprapubic catheter changed per Urology  - Rocephin IV changed to Vanc and Cefepime  - Urine culture pending  - blood culture with NGTD          * Sepsis    - Source likely UTI  - WBC trending downward  -IV antibiotics changed to Vanc,Cefepime, and Flagyl  - Blood and urine culture results with NGTD  - IV fluids  -CT of abdomen showed no acute abnormality identified in the abdomen or pelvis  - chest xray repeated on 5/24/17-unremarkable            VTE Risk Mitigation         Ordered     enoxaparin injection 40 mg  Daily     Route:  Subcutaneous        05/23/17 2322     Place sequential compression device  Until discontinued      05/24/17 0438     Medium Risk of VTE  Once      05/24/17 0023          Aleisha Saenz NP  Department of Hospital Medicine   [facility]

## 2017-05-27 LAB
ANION GAP SERPL CALC-SCNC: 10 MMOL/L
BASOPHILS # BLD AUTO: 0.01 K/UL
BASOPHILS NFR BLD: 0.1 %
BUN SERPL-MCNC: 9 MG/DL
CALCIUM SERPL-MCNC: 8.1 MG/DL
CHLORIDE SERPL-SCNC: 111 MMOL/L
CO2 SERPL-SCNC: 19 MMOL/L
CREAT SERPL-MCNC: 0.7 MG/DL
DIFFERENTIAL METHOD: ABNORMAL
EOSINOPHIL # BLD AUTO: 0.1 K/UL
EOSINOPHIL NFR BLD: 0.5 %
ERYTHROCYTE [DISTWIDTH] IN BLOOD BY AUTOMATED COUNT: 14.3 %
EST. GFR  (AFRICAN AMERICAN): >60 ML/MIN/1.73 M^2
EST. GFR  (NON AFRICAN AMERICAN): >60 ML/MIN/1.73 M^2
GLUCOSE SERPL-MCNC: 108 MG/DL
HCT VFR BLD AUTO: 39.4 %
HGB BLD-MCNC: 13.1 G/DL
LYMPHOCYTES # BLD AUTO: 2.5 K/UL
LYMPHOCYTES NFR BLD: 13.1 %
MCH RBC QN AUTO: 29.4 PG
MCHC RBC AUTO-ENTMCNC: 33.2 %
MCV RBC AUTO: 89 FL
MONOCYTES # BLD AUTO: 1.4 K/UL
MONOCYTES NFR BLD: 7 %
NEUTROPHILS # BLD AUTO: 15.4 K/UL
NEUTROPHILS NFR BLD: 79.3 %
PLATELET # BLD AUTO: 262 K/UL
PMV BLD AUTO: 9.4 FL
POTASSIUM SERPL-SCNC: 2.8 MMOL/L
RBC # BLD AUTO: 4.45 M/UL
SODIUM SERPL-SCNC: 140 MMOL/L
VANCOMYCIN TROUGH SERPL-MCNC: 19.3 UG/ML
WBC # BLD AUTO: 19.37 K/UL

## 2017-05-27 PROCEDURE — 89055 LEUKOCYTE ASSESSMENT FECAL: CPT

## 2017-05-27 PROCEDURE — 97530 THERAPEUTIC ACTIVITIES: CPT

## 2017-05-27 PROCEDURE — 87046 STOOL CULTR AEROBIC BACT EA: CPT | Mod: 59

## 2017-05-27 PROCEDURE — G8988 SELF CARE GOAL STATUS: HCPCS | Mod: CL

## 2017-05-27 PROCEDURE — 25000003 PHARM REV CODE 250: Performed by: INTERNAL MEDICINE

## 2017-05-27 PROCEDURE — G8987 SELF CARE CURRENT STATUS: HCPCS | Mod: CM

## 2017-05-27 PROCEDURE — G8978 MOBILITY CURRENT STATUS: HCPCS | Mod: CL

## 2017-05-27 PROCEDURE — 25000003 PHARM REV CODE 250: Performed by: EMERGENCY MEDICINE

## 2017-05-27 PROCEDURE — 21400001 HC TELEMETRY ROOM

## 2017-05-27 PROCEDURE — 85025 COMPLETE CBC W/AUTO DIFF WBC: CPT

## 2017-05-27 PROCEDURE — 97166 OT EVAL MOD COMPLEX 45 MIN: CPT

## 2017-05-27 PROCEDURE — 63600175 PHARM REV CODE 636 W HCPCS: Performed by: INTERNAL MEDICINE

## 2017-05-27 PROCEDURE — 25000003 PHARM REV CODE 250: Performed by: NURSE PRACTITIONER

## 2017-05-27 PROCEDURE — 87427 SHIGA-LIKE TOXIN AG IA: CPT

## 2017-05-27 PROCEDURE — 87045 FECES CULTURE AEROBIC BACT: CPT

## 2017-05-27 PROCEDURE — 80202 ASSAY OF VANCOMYCIN: CPT

## 2017-05-27 PROCEDURE — 87449 NOS EACH ORGANISM AG IA: CPT

## 2017-05-27 PROCEDURE — 63600175 PHARM REV CODE 636 W HCPCS: Performed by: EMERGENCY MEDICINE

## 2017-05-27 PROCEDURE — 97163 PT EVAL HIGH COMPLEX 45 MIN: CPT

## 2017-05-27 PROCEDURE — 63600175 PHARM REV CODE 636 W HCPCS: Performed by: NURSE PRACTITIONER

## 2017-05-27 PROCEDURE — 80048 BASIC METABOLIC PNL TOTAL CA: CPT

## 2017-05-27 PROCEDURE — 87209 SMEAR COMPLEX STAIN: CPT

## 2017-05-27 PROCEDURE — G8979 MOBILITY GOAL STATUS: HCPCS | Mod: CJ

## 2017-05-27 RX ORDER — OXYCODONE HYDROCHLORIDE 5 MG/1
10 TABLET ORAL EVERY 4 HOURS PRN
Status: DISCONTINUED | OUTPATIENT
Start: 2017-05-27 | End: 2017-05-29

## 2017-05-27 RX ORDER — LANOLIN ALCOHOL/MO/W.PET/CERES
400 CREAM (GRAM) TOPICAL DAILY
Status: DISCONTINUED | OUTPATIENT
Start: 2017-05-27 | End: 2017-05-29 | Stop reason: HOSPADM

## 2017-05-27 RX ORDER — POTASSIUM CHLORIDE 7.45 MG/ML
10 INJECTION INTRAVENOUS
Status: COMPLETED | OUTPATIENT
Start: 2017-05-27 | End: 2017-05-27

## 2017-05-27 RX ADMIN — CEFEPIME 1 G: 1 INJECTION, POWDER, FOR SOLUTION INTRAMUSCULAR; INTRAVENOUS at 01:05

## 2017-05-27 RX ADMIN — METRONIDAZOLE 500 MG: 500 INJECTION, SOLUTION INTRAVENOUS at 02:05

## 2017-05-27 RX ADMIN — HYDRALAZINE HYDROCHLORIDE 10 MG: 20 INJECTION INTRAMUSCULAR; INTRAVENOUS at 11:05

## 2017-05-27 RX ADMIN — POTASSIUM CHLORIDE 10 MEQ: 10 INJECTION, SOLUTION INTRAVENOUS at 03:05

## 2017-05-27 RX ADMIN — SODIUM CHLORIDE, PRESERVATIVE FREE 10 ML: 5 INJECTION INTRAVENOUS at 12:05

## 2017-05-27 RX ADMIN — SODIUM CHLORIDE: 0.9 INJECTION, SOLUTION INTRAVENOUS at 12:05

## 2017-05-27 RX ADMIN — DICYCLOMINE HYDROCHLORIDE 10 MG: 10 CAPSULE ORAL at 05:05

## 2017-05-27 RX ADMIN — ENOXAPARIN SODIUM 40 MG: 100 INJECTION SUBCUTANEOUS at 05:05

## 2017-05-27 RX ADMIN — OXYCODONE HYDROCHLORIDE 5 MG: 5 TABLET ORAL at 05:05

## 2017-05-27 RX ADMIN — POTASSIUM CHLORIDE 10 MEQ: 10 INJECTION, SOLUTION INTRAVENOUS at 02:05

## 2017-05-27 RX ADMIN — SODIUM CHLORIDE, PRESERVATIVE FREE 10 ML: 5 INJECTION INTRAVENOUS at 11:05

## 2017-05-27 RX ADMIN — DICYCLOMINE HYDROCHLORIDE 10 MG: 10 CAPSULE ORAL at 11:05

## 2017-05-27 RX ADMIN — METRONIDAZOLE 500 MG: 500 INJECTION, SOLUTION INTRAVENOUS at 11:05

## 2017-05-27 RX ADMIN — OXYCODONE HYDROCHLORIDE 10 MG: 5 TABLET ORAL at 05:05

## 2017-05-27 RX ADMIN — DICYCLOMINE HYDROCHLORIDE 10 MG: 10 CAPSULE ORAL at 12:05

## 2017-05-27 RX ADMIN — OXYCODONE HYDROCHLORIDE 10 MG: 5 TABLET ORAL at 11:05

## 2017-05-27 RX ADMIN — OXYCODONE HYDROCHLORIDE 10 MG: 5 TABLET ORAL at 09:05

## 2017-05-27 RX ADMIN — VANCOMYCIN HYDROCHLORIDE 1000 MG: 1 INJECTION, POWDER, LYOPHILIZED, FOR SOLUTION INTRAVENOUS at 05:05

## 2017-05-27 RX ADMIN — VANCOMYCIN HYDROCHLORIDE 1250 MG: 1 INJECTION, POWDER, LYOPHILIZED, FOR SOLUTION INTRAVENOUS at 04:05

## 2017-05-27 RX ADMIN — MAGNESIUM OXIDE TAB 400 MG (241.3 MG ELEMENTAL MG) 400 MG: 400 (241.3 MG) TAB at 02:05

## 2017-05-27 RX ADMIN — OXYCODONE HYDROCHLORIDE 5 MG: 5 TABLET ORAL at 01:05

## 2017-05-27 RX ADMIN — METRONIDAZOLE 500 MG: 500 INJECTION, SOLUTION INTRAVENOUS at 07:05

## 2017-05-27 RX ADMIN — METRONIDAZOLE 500 MG: 500 INJECTION, SOLUTION INTRAVENOUS at 12:05

## 2017-05-27 RX ADMIN — SODIUM CHLORIDE, PRESERVATIVE FREE 10 ML: 5 INJECTION INTRAVENOUS at 06:05

## 2017-05-27 NOTE — PLAN OF CARE
Problem: Patient Care Overview  Goal: Plan of Care Review  Patient stable. Plan of care reviewed. Patient verbalizes understanding. Patient requesting pain meds throughout shift. IV fluids infusing. Bed low, wheels locked, bed alarm on, call light in reach. Patient instructed to call for assistance. Will continue to monitor.

## 2017-05-27 NOTE — PT/OT/SLP EVAL
Occupational Therapy  Evaluation    Desirae No   MRN: [id number]   Admitting Diagnosis: Sepsis    OT Date of Treatment: 05/27/17   OT Start Time: 1015  OT Stop Time: 1040  OT Total Time (min): 25 min    Billable Minutes:  Evaluation 15  Therapeutic Activity 10    Diagnosis: Sepsis   OT TX DX: DEBILITY    Past Medical History:   Diagnosis Date    DDD (degenerative disc disease), lumbar 4/11/2014    Hypertension     Neurogenic bladder     Pulmonary embolism 4/11/2014      Past Surgical History:   Procedure Laterality Date    CERVICAL FUSION      CHOLECYSTECTOMY      HYSTERECTOMY  1974    menorrhagia    JOINT REPLACEMENT      neck fusion         Referring physician: DR. ORTEGA  Date referred to OT: 5/26/17    General Precautions: Standard, fall  Orthopedic Precautions: N/A  Braces: N/A          Patient History:  Living Environment  Lives With: spouse  Living Arrangements: house  Home Accessibility: stairs to enter home  Home Layout: Able to live on 1st floor  Number of Stairs to Enter Home: 4  Stair Railings at Home: outside, present at both sides  Transportation Available: family or friend will provide  Living Environment Comment: PT REPORTS SHE LIVES WITH SPOUSE IN [facility] WITH RAMP TO ENTER. HOME IS ONE STORY HOME, 4 STEPS TO ENTER. FUNCTIONALLY INDEPENDENTM WITH OCCASIONAL USE OF RW FOR MOBILITY.  Equipment Currently Used at Home: walker, rolling, shower chair, grab bar, wheelchair, bedside commode    Prior level of function:   Bed Mobility/Transfers: independent  Grooming: independent  Bathing: independent  Upper Body Dressing: independent  Lower Body Dressing: independent  Toileting: independent  Home Management Skills: independent  Homemaking Responsibilities: No  Driving License: No  Mode of Transportation: Family, Friends  Occupation: Retired     Dominant hand: right    Subjective:  Communicated with RN prior to session.    Chief Complaint: BACK AND STOMACH PAIN  Patient/Family stated goals:  RETURN HOME    Pain/Comfort  Pain Rating 1: 8/10  Location - Orientation 1: medial  Location 1: abdomen  Pain Addressed 1: Pre-medicate for activity, Nurse notified, Cessation of Activity  Pain Rating Post-Intervention 2: 7/10    Objective:  Patient found with: telemetry, peripheral IV, bed alarm (SUPRAPUBIC CATHETER)    Cognitive Exam:  Oriented to: Person, Place and Situation  Follows Commands/attention: Follows one-step commands  Communication: clear/fluent  Memory:  Poor immediate recall  Safety awareness/insight to disability: impaired  Coping skills/emotional control: Appropriate to situation    Visual/perceptual:  Intact    Physical Exam:  Postural examination/scapula alignment: Rounded shoulder  Skin integrity: Visible skin intact  Edema: None noted     Sensation:   Intact    Upper Extremity Range of Motion:  Right Upper Extremity: WFL except SHOULDER MINIMAL ACTIVE MOVEMENT  Left Upper Extremity: WFL except SHOULDER MINIMAL ACTIVE MOVEMENT    Upper Extremity Strength:  Right Upper Extremity: WFL except SHOULDER NOT TESTED  Left Upper Extremity: WFL except SHOULDER NOT TESTED   Strength: WFL    Fine motor coordination:   Intact      Functional Mobility:  Bed Mobility:  Rolling/Turning to Left: Stand by assistance  Rolling/Turning Right: Stand by assistance  Scooting/Bridging: Stand by Assistance  Supine to Sit: Stand by Assistance    Transfers:  Sit <> Stand Assistance: Moderate Assistance  Sit <> Stand Assistive Device: Rolling Walker  Bed <> Chair Technique: Stand Pivot  Bed <> Chair Transfer Assistance: Moderate Assistance  Bed <> Chair Assistive Device: Rolling Walker    Functional Ambulation: PT TRANSFERRED TO BEDSIDE CHAIR WITH MOD A WITH RW, IMPAIRED SAFETY AND REPORTS OF DIZZINESS    Activities of Daily Living:       LE Dressing Level of Assistance: Total assistance          Toileting Where Assessed: Bed level  Toileting Level of Assistance: Total assistance    Balance:   Static Sit: FAIR+: Able  "to take MINIMAL challenges from all directions  Dynamic Sit: FAIR+: Maintains balance through MINIMAL excursions of active trunk motion  Static Stand: POOR: Needs MODERATE assist to maintain  Dynamic stand: 0: N/A      AM-PAC 6 CLICK ADL  How much help from another person does this patient currently need?  1 = Unable, Total/Dependent Assistance  2 = A lot, Maximum/Moderate Assistance  3 = A little, Minimum/Contact Guard/Supervision  4 = None, Modified Sac/Independent    Putting on and taking off regular lower body clothing? : 1  Bathing (including washing, rinsing, drying)?: 1  Toileting, which includes using toilet, bedpan, or urinal? : 1  Putting on and taking off regular upper body clothing?: 2  Taking care of personal grooming such as brushing teeth?: 2  Eating meals?: 3  Total Score: 10    AM-PAC Raw Score CMS "G-Code Modifier Level of Impairment Assistance   6 % Total / Unable   7 - 9 CM 80 - 100% Maximal Assist   10-14 CL 60 - 80% Moderate Assist   15 - 19 CK 40 - 60% Moderate Assist   20 - 22 CJ 20 - 40% Minimal Assist   23 CI 1-20% SBA / CGA   24 CH 0% Independent/ Mod I       Patient left up in chair with all lines intact, call button in reach and NURSE notified    Assessment:  Desirae No is a 66 y.o. female with a medical diagnosis of Sepsis and presents with DEBILITY, IMPAIRED ADLS, FUNCTIONAL MOBILITY AND SAFETY. PT WILL BENEFIT FROM SKILLED OT SERVICES TO ADDRESS STATED DEFICITS AND INCREASE FUNCTIONAL INDEPENDENCE.    Rehab identified problem list/impairments: Rehab identified problem list/impairments: weakness, impaired endurance, impaired self care skills, impaired functional mobilty, gait instability, impaired balance, decreased coordination, pain, decreased safety awareness, decreased ROM, impaired coordination    Rehab potential is good.    Activity tolerance: Poor    Discharge recommendations: Discharge Facility/Level Of Care Needs: nursing facility, skilled     Barriers to " discharge: Barriers to Discharge: None    Equipment recommendations: none     GOALS:    Occupational Therapy Goals        Problem: Occupational Therapy Goal    Goal Priority Disciplines Outcome Interventions   Occupational Therapy Goal     OT, PT/OT     Description:  Goals to be met by: 6/3/17     Patient will increase functional independence with ADLs by performing:    UE Dressing with Moderate Assistance.  LE Dressing with Maximum Assistance.  Grooming while seated at sink with Supervision.  Toileting from bedside commode with Moderate Assistance for hygiene and clothing management.   Stand pivot transfers with Minimal Assistance.  Toilet transfer to bedside commode with Minimal Assistance.  Upper extremity exercise program x10 reps per handout, with supervision.                      PLAN:  Patient to be seen 3 x/week to address the above listed problems via self-care/home management, therapeutic activities, therapeutic exercises  Plan of Care expires: 06/03/17  Plan of Care reviewed with: patient    OT G-codes  Functional Assessment Tool Used: FIM-ADL  Score: 2  Functional Limitation: Self care  Self Care Current Status (): YI  Self Care Goal Status (): AMY Maxwell OT  05/27/2017

## 2017-05-27 NOTE — CLINICAL REVIEW
Pharmacy Vancomycin Consult    Consult day #4  Vancomycin trough 5/27 at 1525 = 19.3  Decrease dose to 1,000 mg every 12 hours  Next trough due 5/29 at 0430  Dx: UTI, Sepsis  Goal trough 15-20 (Dose lowered in anticipation of accumulation and resulting supratherapeutic trough)  Scr stable - 0.7 (CrCl 91.7 ml/min)  WBC significantly decreased since 5/25 (32.36) to today 5/27 19.37  Tmax 100.4  Patient is also on Cefepime and Flagyl  Cultures NGTD    Pharmacy will continue to closely monitor patient and make adjustments as necessary.   Thank you for allowing us to participate in this patient's care,   Isela Shepherd 5/27/2017 4:34 PM

## 2017-05-27 NOTE — PROGRESS NOTES
Problem: Patient Care Overview  Goal: Plan of Care Review  Outcome: Ongoing (interventions implemented as appropriate)  Pt is without falls or injury this shift. WBC down to 19.37. Continuous IV abx per MAR. Oxycodone given x 2 for pain. Diarrhea x 4 today per brief. Stool samples sent to lab. Spouse at bedside.  Bed low, bed alarm on and call light within reach.

## 2017-05-27 NOTE — PT/OT/SLP EVAL
Physical Therapy  Evaluation    Desirae No   MRN: [id number]   Admitting Diagnosis: Sepsis    PT Received On: 05/27/17  PT Start Time: 1015     PT Stop Time: 1040    PT Total Time (min): 25 min       Billable Minutes:  Evaluation 15 and Therapeutic Activity 10    Diagnosis: Sepsis  P.T. TX DX: IMPAIRED FUNCTIONAL MOBILITY    Past Medical History:   Diagnosis Date    DDD (degenerative disc disease), lumbar 4/11/2014    Hypertension     Neurogenic bladder     Pulmonary embolism 4/11/2014      Past Surgical History:   Procedure Laterality Date    CERVICAL FUSION      CHOLECYSTECTOMY      HYSTERECTOMY  1974    menorrhagia    JOINT REPLACEMENT      neck fusion       Referring physician: DR. ORTEGA  Date referred to PT: 5-26-17    General Precautions: Standard, fall  Orthopedic Precautions: N/A   Braces: N/A       Patient History:  Lives With: spouse  Living Arrangements: house  Home Accessibility: stairs to enter home  Home Layout: Able to live on 1st floor  Number of Stairs to Enter Home: 4  Stair Railings at Home: outside, present at both sides  Transportation Available: family or friend will provide  Living Environment Comment: PT LIVES WITH  WHO IS ABLE TO ASSIST HER AND PROVIDE 24 HOUR CARE AT HOME, PT AND FAMILY DISPLACED BY FLOOD, THEIR HOME WILL BE READY IN APPROX. 3 WEEKS WHICH IS 1 STORY HOUSE WITH 4 STEPS TO ENTER, PT CURRENTLY LIVE IN [facility] WITH RAMP ENTRANCE.  PT REPORTS TO WALK INDEP IN HOME OR USE RW, PT REPORTS A NURSE COMES EVERY 2 WEEK TO CHECK ON HER CATHETER  Equipment Currently Used at Home: walker, rolling, shower chair, grab bar, bedside commode  DME owned (not currently used): none    Previous Level of Function:  Ambulation Skills: independent  Transfer Skills: independent  ADL Skills: independent    Subjective:  Communicated with NURSE JUAN JOSÉ prior to session.  Pain/Comfort  Pain Rating 1: 8/10  Location 1: abdomen  Pain Addressed 1: Pre-medicate for activity, Cessation  of Activity    Objective:   Patient found with: miller catheter, telemetry, peripheral IV     Cognitive Exam:  Oriented to: Person, Place, Time and Situation    Follows Commands/attention: Follows one-step commands  Communication: clear/fluent  Safety awareness/insight to disability: impaired    Physical Exam:  Postural examination/scapula alignment: Rounded shoulder and Head forward    Skin integrity: Visible skin intact  Edema: Moderate ALL EXTREMITIES    Sensation:   Intact    Upper Extremity Range of Motion:  REFER TO O.T. EVAL    Lower Extremity Range of Motion:  Right Lower Extremity: WFL  Left Lower Extremity: WFL    Lower Extremity Strength:  Right Lower Extremity: GROSSLY 3/5  Left Lower Extremity: GROSSLY 3/5     Functional Mobility:  Bed Mobility:  Rolling/Turning to Left: Minimum assistance  Rolling/Turning Right: Minimum assistance  Scooting/Bridging: Minimum Assistance  Supine to Sit: Minimum Assistance (SLOW MOVING BUT TIME ALLOWED FOR ACTIVE PARTICIPATION)    Transfers:  Sit <> Stand Assistance: Moderate Assistance  Sit <> Stand Assistive Device: Rolling Walker  Bed <> Chair Technique: Stand Pivot  Bed <> Chair Assistance: Moderate Assistance (PT C/O DIZZINESS)  Bed <> Chair Assistive Device: Rolling Walker    Gait:   Gait Distance: UNABLE TO TOLERATE THIS VISIT, PT C/O DIZZINESS AND WEAKNESS    Balance:   Static Sit: FAIR  Dynamic Sit: POOR+  Static Stand: POOR  Dynamic stand: POOR    Therapeutic Activities and Exercises:  PT REQUIRED EXTRA TIME FOR ALL FUNCTIONAL MOBILITY, PT PRESENTS WITH INCREASED EDEMA IN ALL EXTREMITIES, PT EDUCATED IN BLE THEREX TO PERFORM WHILE SEATED IN CHAIR, PT REPORTS FEELING BETTER ONCE SITTING IN CHAIR    AM-PAC 6 CLICK MOBILITY  How much help from another person does this patient currently need?   1 = Unable, Total/Dependent Assistance  2 = A lot, Maximum/Moderate Assistance  3 = A little, Minimum/Contact Guard/Supervision  4 = None, Modified  [place]/Independent    Turning over in bed (including adjusting bedclothes, sheets and blankets)?: 3  Sitting down on and standing up from a chair with arms (e.g., wheelchair, bedside commode, etc.): 3  Moving from lying on back to sitting on the side of the bed?: 3  Moving to and from a bed to a chair (including a wheelchair)?: 2  Need to walk in hospital room?: 1 (UNABLE TO ASSESS)  Climbing 3-5 steps with a railing?: 1 (NT)  Total Score: 13     AM-PAC Raw Score CMS G-Code Modifier Level of Impairment Assistance   6 % Total / Unable   7 - 9 CM 80 - 100% Maximal Assist   10 - 14 CL 60 - 80% Moderate Assist   15 - 19 CK 40 - 60% Moderate Assist   20 - 22 CJ 20 - 40% Minimal Assist   23 CI 1-20% SBA / CGA   24 CH 0% Independent/ Mod I     Patient left up in chair with all lines intact, call button in reach and NURSE notified.    Assessment:   Desirae No is a 66 y.o. female with a medical diagnosis of Sepsis and presents with IMPAIRED FUNCTIONAL MOBILITY. PT WILL BENEFIT FROM CONT. SKILLED P.T. TO ADDRESS IMPAIRMENTS    Rehab identified problem list/impairments: Rehab identified problem list/impairments: weakness, impaired endurance, impaired functional mobilty, impaired balance, pain, decreased safety awareness    Rehab potential is good.    Activity tolerance: Fair    Discharge recommendations: Discharge Facility/Level Of Care Needs: nursing facility, skilled     Barriers to discharge: Barriers to Discharge: None    Equipment recommendations: Equipment Needed After Discharge: none     GOALS:    Physical Therapy Goals        Problem: Physical Therapy Goal    Goal Priority Disciplines Outcome Goal Variances Interventions   Physical Therapy Goal     PT/OT, PT      Description:  LTG'S TO BE MET IN 7 DAYS (6-3-17)  1. PT WILL REQUIRE SBA FOR BED MOBILITY  2. PT WILL REQUIRE CGA FOR TF'S   3. PT WILL ' WITH RW AND CGA  4. PT WILL TOLERATE BLE THEREX X 20 REPS  5. PT WILL DEMO F DYNAMIC BALANCE  DURING GAIT                  PLAN:    Patient to be seen 5 x/week to address the above listed problems via gait training, therapeutic activities, therapeutic exercises  Plan of Care expires: 06/03/17  Plan of Care reviewed with: patient     PT ENCOURAGED TO CALL FOR ASSISTANCE WITH ALL NEEDS DUE TO FALL RISK STATUS, PT AGREEABLE.  PT ENCOURAGED TO INCREASE TIME OOB IN CHAIR, ALL MEALS IN CHAIR OOB, PT AGREEABLE    Functional Assessment Tool Used: BOSTON AMPA  Score: 13  Functional Limitation: Mobility: Walking and moving around  Mobility: Walking and Moving Around Current Status (): CL  Mobility: Walking and Moving Around Goal Status (): OLIVIA Sheth, PT  05/27/2017

## 2017-05-27 NOTE — NURSING
Patient c/o abd pain, requesting more pain medication. Dr. Lauren notified, stated to give prn pain meds due @ 8306 now.

## 2017-05-28 LAB
ALBUMIN SERPL BCP-MCNC: 2.7 G/DL
ANION GAP SERPL CALC-SCNC: 8 MMOL/L
ANION GAP SERPL CALC-SCNC: 9 MMOL/L
BASOPHILS # BLD AUTO: 0.01 K/UL
BASOPHILS NFR BLD: 0.1 %
BUN SERPL-MCNC: 8 MG/DL
BUN SERPL-MCNC: 8 MG/DL
C DIFF GDH STL QL: NEGATIVE
C DIFF TOX A+B STL QL IA: NEGATIVE
CALCIUM SERPL-MCNC: 7.4 MG/DL
CALCIUM SERPL-MCNC: 8 MG/DL
CHLORIDE SERPL-SCNC: 111 MMOL/L
CHLORIDE SERPL-SCNC: 113 MMOL/L
CO2 SERPL-SCNC: 18 MMOL/L
CO2 SERPL-SCNC: 22 MMOL/L
CREAT SERPL-MCNC: 0.7 MG/DL
CREAT SERPL-MCNC: 0.7 MG/DL
DIFFERENTIAL METHOD: ABNORMAL
EOSINOPHIL # BLD AUTO: 0.2 K/UL
EOSINOPHIL NFR BLD: 1 %
ERYTHROCYTE [DISTWIDTH] IN BLOOD BY AUTOMATED COUNT: 14.1 %
EST. GFR  (AFRICAN AMERICAN): >60 ML/MIN/1.73 M^2
EST. GFR  (AFRICAN AMERICAN): >60 ML/MIN/1.73 M^2
EST. GFR  (NON AFRICAN AMERICAN): >60 ML/MIN/1.73 M^2
EST. GFR  (NON AFRICAN AMERICAN): >60 ML/MIN/1.73 M^2
GLUCOSE SERPL-MCNC: 101 MG/DL
GLUCOSE SERPL-MCNC: 99 MG/DL
HCT VFR BLD AUTO: 35.8 %
HGB BLD-MCNC: 12 G/DL
LYMPHOCYTES # BLD AUTO: 2.8 K/UL
LYMPHOCYTES NFR BLD: 14.8 %
MAGNESIUM SERPL-MCNC: 1.8 MG/DL
MCH RBC QN AUTO: 29.5 PG
MCHC RBC AUTO-ENTMCNC: 33.5 %
MCV RBC AUTO: 88 FL
MONOCYTES # BLD AUTO: 1.5 K/UL
MONOCYTES NFR BLD: 8 %
NEUTROPHILS # BLD AUTO: 14.2 K/UL
NEUTROPHILS NFR BLD: 76.1 %
PHOSPHATE SERPL-MCNC: 1.6 MG/DL
PLATELET # BLD AUTO: 271 K/UL
PMV BLD AUTO: 9.2 FL
POTASSIUM SERPL-SCNC: 2.5 MMOL/L
POTASSIUM SERPL-SCNC: 3 MMOL/L
RBC # BLD AUTO: 4.07 M/UL
SODIUM SERPL-SCNC: 140 MMOL/L
SODIUM SERPL-SCNC: 141 MMOL/L
WBC # BLD AUTO: 18.61 K/UL
WBC #/AREA STL HPF: NORMAL /[HPF]

## 2017-05-28 PROCEDURE — 25000003 PHARM REV CODE 250: Performed by: INTERNAL MEDICINE

## 2017-05-28 PROCEDURE — 83735 ASSAY OF MAGNESIUM: CPT

## 2017-05-28 PROCEDURE — 63600175 PHARM REV CODE 636 W HCPCS: Performed by: HOSPITALIST

## 2017-05-28 PROCEDURE — 63600175 PHARM REV CODE 636 W HCPCS: Performed by: EMERGENCY MEDICINE

## 2017-05-28 PROCEDURE — 80048 BASIC METABOLIC PNL TOTAL CA: CPT

## 2017-05-28 PROCEDURE — 21400001 HC TELEMETRY ROOM

## 2017-05-28 PROCEDURE — 85025 COMPLETE CBC W/AUTO DIFF WBC: CPT

## 2017-05-28 PROCEDURE — 97110 THERAPEUTIC EXERCISES: CPT

## 2017-05-28 PROCEDURE — 25000003 PHARM REV CODE 250: Performed by: NURSE PRACTITIONER

## 2017-05-28 PROCEDURE — 80069 RENAL FUNCTION PANEL: CPT

## 2017-05-28 PROCEDURE — 63600175 PHARM REV CODE 636 W HCPCS: Performed by: NURSE PRACTITIONER

## 2017-05-28 PROCEDURE — 97530 THERAPEUTIC ACTIVITIES: CPT

## 2017-05-28 PROCEDURE — 25000003 PHARM REV CODE 250: Performed by: EMERGENCY MEDICINE

## 2017-05-28 RX ORDER — HYDROMORPHONE HYDROCHLORIDE 1 MG/ML
1 INJECTION, SOLUTION INTRAMUSCULAR; INTRAVENOUS; SUBCUTANEOUS ONCE
Status: COMPLETED | OUTPATIENT
Start: 2017-05-28 | End: 2017-05-28

## 2017-05-28 RX ORDER — POTASSIUM CHLORIDE 7.45 MG/ML
10 INJECTION INTRAVENOUS
Status: COMPLETED | OUTPATIENT
Start: 2017-05-28 | End: 2017-05-28

## 2017-05-28 RX ORDER — POTASSIUM CHLORIDE 20 MEQ/1
20 TABLET, EXTENDED RELEASE ORAL 3 TIMES DAILY
Status: COMPLETED | OUTPATIENT
Start: 2017-05-28 | End: 2017-05-28

## 2017-05-28 RX ORDER — MORPHINE SULFATE 4 MG/ML
4 INJECTION, SOLUTION INTRAMUSCULAR; INTRAVENOUS ONCE
Status: COMPLETED | OUTPATIENT
Start: 2017-05-28 | End: 2017-05-28

## 2017-05-28 RX ADMIN — DICYCLOMINE HYDROCHLORIDE 10 MG: 10 CAPSULE ORAL at 11:05

## 2017-05-28 RX ADMIN — CEFEPIME 1 G: 1 INJECTION, POWDER, FOR SOLUTION INTRAMUSCULAR; INTRAVENOUS at 01:05

## 2017-05-28 RX ADMIN — OXYCODONE HYDROCHLORIDE 10 MG: 5 TABLET ORAL at 03:05

## 2017-05-28 RX ADMIN — METRONIDAZOLE 500 MG: 500 INJECTION, SOLUTION INTRAVENOUS at 07:05

## 2017-05-28 RX ADMIN — SODIUM CHLORIDE, PRESERVATIVE FREE 10 ML: 5 INJECTION INTRAVENOUS at 05:05

## 2017-05-28 RX ADMIN — OXYCODONE HYDROCHLORIDE 10 MG: 5 TABLET ORAL at 08:05

## 2017-05-28 RX ADMIN — POLYETHYLENE GLYCOL 3350 17 G: 17 POWDER, FOR SOLUTION ORAL at 08:05

## 2017-05-28 RX ADMIN — ENOXAPARIN SODIUM 40 MG: 100 INJECTION SUBCUTANEOUS at 05:05

## 2017-05-28 RX ADMIN — SODIUM CHLORIDE: 0.9 INJECTION, SOLUTION INTRAVENOUS at 10:05

## 2017-05-28 RX ADMIN — SODIUM CHLORIDE: 0.9 INJECTION, SOLUTION INTRAVENOUS at 01:05

## 2017-05-28 RX ADMIN — OXYCODONE HYDROCHLORIDE 10 MG: 5 TABLET ORAL at 01:05

## 2017-05-28 RX ADMIN — POTASSIUM CHLORIDE 10 MEQ: 10 INJECTION, SOLUTION INTRAVENOUS at 09:05

## 2017-05-28 RX ADMIN — DICYCLOMINE HYDROCHLORIDE 10 MG: 10 CAPSULE ORAL at 04:05

## 2017-05-28 RX ADMIN — POTASSIUM CHLORIDE 20 MEQ: 1500 TABLET, EXTENDED RELEASE ORAL at 01:05

## 2017-05-28 RX ADMIN — METRONIDAZOLE 500 MG: 500 INJECTION, SOLUTION INTRAVENOUS at 10:05

## 2017-05-28 RX ADMIN — SODIUM CHLORIDE, PRESERVATIVE FREE 10 ML: 5 INJECTION INTRAVENOUS at 06:05

## 2017-05-28 RX ADMIN — METRONIDAZOLE 500 MG: 500 INJECTION, SOLUTION INTRAVENOUS at 03:05

## 2017-05-28 RX ADMIN — OXYCODONE HYDROCHLORIDE 10 MG: 5 TABLET ORAL at 07:05

## 2017-05-28 RX ADMIN — MAGNESIUM OXIDE TAB 400 MG (241.3 MG ELEMENTAL MG) 400 MG: 400 (241.3 MG) TAB at 08:05

## 2017-05-28 RX ADMIN — POTASSIUM CHLORIDE 10 MEQ: 10 INJECTION, SOLUTION INTRAVENOUS at 11:05

## 2017-05-28 RX ADMIN — DICYCLOMINE HYDROCHLORIDE 10 MG: 10 CAPSULE ORAL at 05:05

## 2017-05-28 RX ADMIN — HYDROMORPHONE HYDROCHLORIDE 1 MG: 1 INJECTION, SOLUTION INTRAMUSCULAR; INTRAVENOUS; SUBCUTANEOUS at 04:05

## 2017-05-28 RX ADMIN — OXYCODONE HYDROCHLORIDE 10 MG: 5 TABLET ORAL at 11:05

## 2017-05-28 RX ADMIN — SODIUM CHLORIDE, PRESERVATIVE FREE 10 ML: 5 INJECTION INTRAVENOUS at 12:05

## 2017-05-28 RX ADMIN — MORPHINE SULFATE 4 MG: 4 INJECTION, SOLUTION INTRAMUSCULAR; INTRAVENOUS at 12:05

## 2017-05-28 RX ADMIN — SODIUM CHLORIDE, PRESERVATIVE FREE 10 ML: 5 INJECTION INTRAVENOUS at 11:05

## 2017-05-28 RX ADMIN — POTASSIUM CHLORIDE 20 MEQ: 1500 TABLET, EXTENDED RELEASE ORAL at 10:05

## 2017-05-28 NOTE — ASSESSMENT & PLAN NOTE
- Source likely UTI  - WBC trending downward  -IV antibiotics Cefepime, and Flagyl  - Blood and urine culture results with NGTD  - IV fluids  -CT of abdomen showed no acute abnormality identified in the abdomen or pelvis  - chest xray repeated on 5/24/17-unremarkable  - Abdominal pain persists.  Check abdominal XR negative, stool studies pending,Stool negative for C-diff     - Increased Oxycodone to 10 mg and every 4 hours.

## 2017-05-28 NOTE — PLAN OF CARE
Problem: Physical Therapy Goal  Goal: Physical Therapy Goal  LTG'S TO BE MET IN 7 DAYS (6-3-17)  1. PT WILL REQUIRE SBA FOR BED MOBILITY  2. PT WILL REQUIRE CGA FOR TF'S   3. PT WILL ' WITH RW AND CGA  4. PT WILL TOLERATE BLE THEREX X 20 REPS  5. PT WILL DEMO F DYNAMIC BALANCE DURING GAIT   Outcome: Ongoing (interventions implemented as appropriate)  Patient with mod A performed SPT from EOB to chair. Once in chair performed LE therex including: heel/toe taps, LAQs, MIP, hip abduction/adduction.

## 2017-05-28 NOTE — PROGRESS NOTES
[facility] Hospital Medicine  Progress Note    Patient Name: Desirae No  MRN: [id number]  Patient Class: IP- Inpatient   Admission Date: 5/23/2017  Length of Stay: 4 days  Attending Physician: Ellis Cruz MD  Primary Care Provider: Fauzia Muro MD        Subjective:     Principal Problem:Sepsis    HPI:  Ms. No is a 65 y/o  female with h/o neurogenic bladder, has chronically in-dwelling suprapubic catheter, presented to the ED for second time since this mroning for worsening confusion. Last night she presented with abdominal pain, negative CT abdomen, WBC was 18,000, UA was abnormal and discharged from ED on cefuroxime orally. However over the course of the day, she progressively got more confused and was brought back to the ED. WBC increased to 28,000, troponin increased to 0.111 from 0.024. Denies chest pain or SOB. She received one dose of Lovenox 1 mg/kg in the ED.    Hospital Course:  Pt admitted to Telemetry Unit for Sepsis likely due to UTI.  Pt treated with IV antibiotics with blood culture and urine culture sent for analysis.  WBC elevated.  Chest xray unremarkable. CT of Abdomen showed no acute findings.  Flagyl added to antibiotic regime.  Urology consulted and suprapubic catheter changed.  Potassium repleted as needed.  Pt mentation has improved however she continues to be lethargic. Will continue to follow repeat lab and culture results.  WBCs trending downward.  Pain improved with analgesia prn.  PT/OT evaluation ordered.        Interval History:   More alert and WBC continues to trend down.  Diarrhea and abdominal pain perisists.    Review of Systems   Constitutional: Positive for fatigue. Negative for chills, diaphoresis and fever.   HENT: Negative.  Negative for congestion, nosebleeds and sinus pressure.    Eyes: Negative.  Negative for photophobia, redness and visual disturbance.   Respiratory: Negative.  Negative for cough and shortness of breath.     Cardiovascular: Negative.  Negative for chest pain.   Gastrointestinal: Positive for abdominal pain. Negative for diarrhea, nausea and vomiting.   Endocrine: Negative.  Negative for polydipsia, polyphagia and polyuria.   Genitourinary: Negative for dysuria, flank pain, frequency and urgency.   Musculoskeletal: Negative.  Negative for back pain and neck stiffness.   Skin: Negative.  Negative for color change, pallor and rash.   Allergic/Immunologic: Negative.  Negative for environmental allergies, food allergies and immunocompromised state.   Neurological: Negative.  Negative for dizziness, syncope, speech difficulty, numbness and headaches.   Hematological: Negative.  Does not bruise/bleed easily.   Psychiatric/Behavioral: Negative for confusion, decreased concentration and hallucinations. The patient is not nervous/anxious.    All other systems reviewed and are negative.    Objective:     Vital Signs (Most Recent):  Temp: 98.6 °F (37 °C) (05/27/17 1700)  Pulse: 108 (05/27/17 1700)  Resp: 18 (05/27/17 1700)  BP: (!) 151/127 (05/27/17 1700)  SpO2: 95 % (05/27/17 1700) Vital Signs (24h Range):  Temp:  [97.8 °F (36.6 °C)-99.4 °F (37.4 °C)] 98.6 °F (37 °C)  Pulse:  [] 108  Resp:  [16-18] 18  SpO2:  [95 %-100 %] 95 %  BP: (142-184)/() 151/127     Weight: 93 kg (205 lb)  Body mass index is 31.17 kg/m².    Intake/Output Summary (Last 24 hours) at 05/27/17 2047  Last data filed at 05/27/17 2030   Gross per 24 hour   Intake              930 ml   Output             3000 ml   Net            -2070 ml      Physical Exam   Constitutional: She is oriented to person, place, and time. No distress.   Chronically ill appearing   HENT:   Head: Normocephalic and atraumatic.   Eyes: Conjunctivae and EOM are normal. No scleral icterus.   Neck: Normal range of motion. Neck supple. No JVD present. No tracheal deviation present. No thyromegaly present.   Cardiovascular: Normal rate, regular rhythm, normal heart sounds and  intact distal pulses.    No murmur heard.  Pulmonary/Chest: Effort normal and breath sounds normal. No respiratory distress. She has no wheezes. She has no rales. She exhibits no tenderness.   Abdominal: Soft. Bowel sounds are normal. She exhibits no distension. There is tenderness (epigastric).   Suprapubic catheter in place   Musculoskeletal: Normal range of motion. She exhibits no edema or tenderness.   Lymphadenopathy:     She has no cervical adenopathy.   Neurological: She is alert and oriented to person, place, and time. No cranial nerve deficit. She exhibits normal muscle tone. Coordination normal.   Skin: Skin is warm and dry. She is not diaphoretic. No erythema.   Psychiatric: She has a normal mood and affect.   Nursing note and vitals reviewed.      Significant Labs:   CBC:     Recent Labs  Lab 05/26/17  0524 05/27/17  0503   WBC 22.42* 19.37*   HGB 12.5 13.1   HCT 38.1 39.4    262     CMP:     Recent Labs  Lab 05/26/17 0524 05/27/17  0503    140   K 2.9* 2.8*   * 111*   CO2 20* 19*    108   BUN 12 9   CREATININE 0.7 0.7   CALCIUM 7.7* 8.1*   ANIONGAP 7* 10   EGFRNONAA >60 >60       Significant Imaging:   Imaging Results          X-Ray Abdomen AP 1 View (Final result)  Result time 05/27/17 15:42:54    Final result by Jax Barnhart MD (05/27/17 15:42:54)                 Impression:     Nonspecific bowel gas pattern      Electronically signed by: JAX BARNHART MD  Date:     05/27/17  Time:    15:42              Narrative:    History: Abdominal pain    The bowel gas pattern is nonspecific. Clips in the right upper quadrant. There is an IVC filter. There is a bladder catheter.                             X-Ray Chest 1 View for PICC_Central line (Final result)  Result time 05/24/17 07:54:48    Final result by ALISHA Woodward Sr., MD (05/24/17 07:54:48)                 Impression:        1. There has been interval placement of a right PICC. The tip is in the superior vena  cava.  2. The lungs are clear.   3. There is anterior and posterior spinal fusion hardware projected over the cervical spine.      Electronically signed by: ALISHA GUADALUPE MD  Date:     05/24/17  Time:    07:54              Narrative:    One-view chest x-ray    Clinical History: Chest pain    Finding: Comparison was made to a prior examination performed on 5/23/2017. There has been interval placement of a right PICC. The tip is in the superior vena cava. The size of the heart is normal. The lungs are clear. There is no pneumothorax.  The costophrenic angles are sharp. There is anterior and posterior spinal fusion hardware projected over the cervical spine.                             CT Head Without Contrast (Final result)  Result time 05/23/17 22:14:50    Final result by Rhonda Barkley MD (05/23/17 22:14:50)                 Impression:         CT scan of the brain demonstrates no acute findings or significant interval change.  All CT scans at this facility use dose modulation, iterative reconstruction, and/or weight based dosing when appropriate to reduce radiation dose to as low as reasonable achievable.      Electronically signed by: RHONDA BARKLEY MD  Date:     05/23/17  Time:    22:14              Narrative:    CT HEAD WITHOUT CONTRAST    Date: 05/23/17 21:58:42    Clinical indication:  syncope     Technique:  Standard noncontrast CT scan of the brain. Comparison is made to previous study of 07/30/2015.     Findings:  The ventricles are nondilated. Gray and white matter structures reveal normal attenuation. No hemorrhage, mass effect or edema is identified.  There is atherosclerosis of intracranial vessels.  The skull and skull base are unremarkable.                            Assessment/Plan:      Hypokalemia    - Replace  -will continue to monitor  -repeat CMP in am           Acute encephalopathy    - likely due to UTI  -improved  -neuro checks  - CT head unremarkable          Elevated troponin    - Unclear  etiology, but likely suspect sepsis induced  - serial cardiac enzymes with downward trend noted  - Patient denies chest pain or SOB but reports epigastric pain   - EKG showed ST  -Echo showed EF 55% with diastolic dysfunction  - Lovenox 1 mg/kg in ED          Urinary tract infection associated with catheterization of urinary tract    - UTI due to chronic suprapubic catheter in place  - suprapubic catheter changed per Urology  - Rocephin IV changed to Vanc and Cefepime  - Urine culture pending  - blood culture with NGTD          * Sepsis    - Source likely UTI  - WBC trending downward  -IV antibiotics changed to Vanc,Cefepime, and Flagyl  - Blood and urine culture results with NGTD  - IV fluids  -CT of abdomen showed no acute abnormality identified in the abdomen or pelvis  - chest xray repeated on 5/24/17-unremarkable  - Abdominal pain persists.  Check abdominal XR, stool studies, and continue Cefepime and Flagyl.  Stop Vancomycin.  - Increased Oxycodone to 10 mg and every 4 hours.          VTE Risk Mitigation         Ordered     enoxaparin injection 40 mg  Daily     Route:  Subcutaneous        05/23/17 2322     Place sequential compression device  Until discontinued      05/24/17 0438     Medium Risk of VTE  Once      05/24/17 0023          Ellis Cruz MD  Department of Hospital Medicine   [facility]

## 2017-05-28 NOTE — ASSESSMENT & PLAN NOTE
- UTI due to chronic suprapubic catheter in place  - suprapubic catheter changed per Urology  - IV Cefepime, Vanco stopped   - Urine culture NEGATIVE  - blood culture with NGTD

## 2017-05-28 NOTE — SUBJECTIVE & OBJECTIVE
Interval History:   More alert and WBC continues to trend down.  Diarrhea and abdominal pain perisists.    Review of Systems   Constitutional: Positive for fatigue. Negative for chills, diaphoresis and fever.   HENT: Negative.  Negative for congestion, nosebleeds and sinus pressure.    Eyes: Negative.  Negative for photophobia, redness and visual disturbance.   Respiratory: Negative.  Negative for cough and shortness of breath.    Cardiovascular: Negative.  Negative for chest pain.   Gastrointestinal: Positive for abdominal pain. Negative for diarrhea, nausea and vomiting.   Endocrine: Negative.  Negative for polydipsia, polyphagia and polyuria.   Genitourinary: Negative for dysuria, flank pain, frequency and urgency.   Musculoskeletal: Negative.  Negative for back pain and neck stiffness.   Skin: Negative.  Negative for color change, pallor and rash.   Allergic/Immunologic: Negative.  Negative for environmental allergies, food allergies and immunocompromised state.   Neurological: Negative.  Negative for dizziness, syncope, speech difficulty, numbness and headaches.   Hematological: Negative.  Does not bruise/bleed easily.   Psychiatric/Behavioral: Negative for confusion, decreased concentration and hallucinations. The patient is not nervous/anxious.    All other systems reviewed and are negative.    Objective:     Vital Signs (Most Recent):  Temp: 98.6 °F (37 °C) (05/27/17 1700)  Pulse: 108 (05/27/17 1700)  Resp: 18 (05/27/17 1700)  BP: (!) 151/127 (05/27/17 1700)  SpO2: 95 % (05/27/17 1700) Vital Signs (24h Range):  Temp:  [97.8 °F (36.6 °C)-99.4 °F (37.4 °C)] 98.6 °F (37 °C)  Pulse:  [] 108  Resp:  [16-18] 18  SpO2:  [95 %-100 %] 95 %  BP: (142-184)/() 151/127     Weight: 93 kg (205 lb)  Body mass index is 31.17 kg/m².    Intake/Output Summary (Last 24 hours) at 05/27/17 2047  Last data filed at 05/27/17 2030   Gross per 24 hour   Intake              930 ml   Output             3000 ml   Net             -2070 ml      Physical Exam   Constitutional: She is oriented to person, place, and time. No distress.   Chronically ill appearing   HENT:   Head: Normocephalic and atraumatic.   Eyes: Conjunctivae and EOM are normal. No scleral icterus.   Neck: Normal range of motion. Neck supple. No JVD present. No tracheal deviation present. No thyromegaly present.   Cardiovascular: Normal rate, regular rhythm, normal heart sounds and intact distal pulses.    No murmur heard.  Pulmonary/Chest: Effort normal and breath sounds normal. No respiratory distress. She has no wheezes. She has no rales. She exhibits no tenderness.   Abdominal: Soft. Bowel sounds are normal. She exhibits no distension. There is tenderness (epigastric).   Suprapubic catheter in place   Musculoskeletal: Normal range of motion. She exhibits no edema or tenderness.   Lymphadenopathy:     She has no cervical adenopathy.   Neurological: She is alert and oriented to person, place, and time. No cranial nerve deficit. She exhibits normal muscle tone. Coordination normal.   Skin: Skin is warm and dry. She is not diaphoretic. No erythema.   Psychiatric: She has a normal mood and affect.   Nursing note and vitals reviewed.      Significant Labs:   CBC:     Recent Labs  Lab 05/26/17  0524 05/27/17  0503   WBC 22.42* 19.37*   HGB 12.5 13.1   HCT 38.1 39.4    262     CMP:     Recent Labs  Lab 05/26/17  0524 05/27/17  0503    140   K 2.9* 2.8*   * 111*   CO2 20* 19*    108   BUN 12 9   CREATININE 0.7 0.7   CALCIUM 7.7* 8.1*   ANIONGAP 7* 10   EGFRNONAA >60 >60       Significant Imaging:   Imaging Results          X-Ray Abdomen AP 1 View (Final result)  Result time 05/27/17 15:42:54    Final result by Jax Barnhart MD (05/27/17 15:42:54)                 Impression:     Nonspecific bowel gas pattern      Electronically signed by: JXA BARNHART MD  Date:     05/27/17  Time:    15:42              Narrative:    History: Abdominal pain    The  bowel gas pattern is nonspecific. Clips in the right upper quadrant. There is an IVC filter. There is a bladder catheter.                             X-Ray Chest 1 View for PICC_Central line (Final result)  Result time 05/24/17 07:54:48    Final result by ALISHA Woodward Sr., MD (05/24/17 07:54:48)                 Impression:        1. There has been interval placement of a right PICC. The tip is in the superior vena cava.  2. The lungs are clear.   3. There is anterior and posterior spinal fusion hardware projected over the cervical spine.      Electronically signed by: ALISHA WOODWARD MD  Date:     05/24/17  Time:    07:54              Narrative:    One-view chest x-ray    Clinical History: Chest pain    Finding: Comparison was made to a prior examination performed on 5/23/2017. There has been interval placement of a right PICC. The tip is in the superior vena cava. The size of the heart is normal. The lungs are clear. There is no pneumothorax.  The costophrenic angles are sharp. There is anterior and posterior spinal fusion hardware projected over the cervical spine.                             CT Head Without Contrast (Final result)  Result time 05/23/17 22:14:50    Final result by Rhonda Barkley MD (05/23/17 22:14:50)                 Impression:         CT scan of the brain demonstrates no acute findings or significant interval change.  All CT scans at this facility use dose modulation, iterative reconstruction, and/or weight based dosing when appropriate to reduce radiation dose to as low as reasonable achievable.      Electronically signed by: RHONDA BARKLEY MD  Date:     05/23/17  Time:    22:14              Narrative:    CT HEAD WITHOUT CONTRAST    Date: 05/23/17 21:58:42    Clinical indication:  syncope     Technique:  Standard noncontrast CT scan of the brain. Comparison is made to previous study of 07/30/2015.     Findings:  The ventricles are nondilated. Gray and white matter structures reveal normal  attenuation. No hemorrhage, mass effect or edema is identified.  There is atherosclerosis of intracranial vessels.  The skull and skull base are unremarkable.

## 2017-05-28 NOTE — PROGRESS NOTES
[facility] Hospital Medicine  Progress Note    Patient Name: Desirae No  MRN: [id number]  Patient Class: IP- Inpatient   Admission Date: 5/23/2017  Length of Stay: 5 days  Attending Physician: Ellis Cruz MD  Primary Care Provider: Fauzia Muro MD        Subjective:     Principal Problem:Sepsis    HPI:  Ms. No is a 67 y/o  female with h/o neurogenic bladder, has chronically in-dwelling suprapubic catheter, presented to the ED for second time since this mroning for worsening confusion. Last night she presented with abdominal pain, negative CT abdomen, WBC was 18,000, UA was abnormal and discharged from ED on cefuroxime orally. However over the course of the day, she progressively got more confused and was brought back to the ED. WBC increased to 28,000, troponin increased to 0.111 from 0.024. Denies chest pain or SOB. She received one dose of Lovenox 1 mg/kg in the ED.    Hospital Course:  Pt admitted to Telemetry Unit for Sepsis likely due to UTI.  Pt treated with IV antibiotics with blood culture and urine culture sent for analysis.  WBC trending down.  Chest xray unremarkable. CT of Abdomen showed no acute findings.  Flagyl added to antibiotic regime.  Urology consulted and suprapubic catheter changed.  Potassium repleted as needed. Stool for C-diff negative. Stool culture pending. Will continue to follow repeat lab and culture results.  WBCs trending downward. X-ray of abdomen revealed nonspecific bowel gas pattern. Pain improved with Dilaudid. PT/OT evaluation ordered. Renal panel pending.       Interval History: Patient continue to complain of epigastric pain and diarrhea. Stool negative for C-diff. Renal panel pending.     Review of Systems   Constitutional: Positive for fatigue. Negative for chills, diaphoresis and fever.   HENT: Negative.  Negative for congestion, nosebleeds and sinus pressure.    Eyes: Negative.  Negative for photophobia, redness and visual  disturbance.   Respiratory: Negative.  Negative for cough and shortness of breath.    Cardiovascular: Negative.  Negative for chest pain.   Gastrointestinal: Positive for abdominal pain and diarrhea. Negative for nausea and vomiting.   Endocrine: Negative.  Negative for polydipsia, polyphagia and polyuria.   Genitourinary: Negative for dysuria, flank pain, frequency and urgency.   Musculoskeletal: Negative.  Negative for back pain and neck stiffness.   Skin: Negative.  Negative for color change, pallor and rash.   Allergic/Immunologic: Negative.  Negative for environmental allergies, food allergies and immunocompromised state.   Neurological: Negative.  Negative for dizziness, syncope, speech difficulty, numbness and headaches.   Hematological: Negative.  Does not bruise/bleed easily.   Psychiatric/Behavioral: Negative for confusion, decreased concentration and hallucinations. The patient is not nervous/anxious.    All other systems reviewed and are negative.    Objective:     Vital Signs (Most Recent):  Temp: 99.6 °F (37.6 °C) (05/28/17 1200)  Pulse: 92 (05/28/17 1200)  Resp: 20 (05/28/17 1200)  BP: (!) 159/70 (05/28/17 1200)  SpO2: 99 % (05/28/17 1200) Vital Signs (24h Range):  Temp:  [98.1 °F (36.7 °C)-100.3 °F (37.9 °C)] 99.6 °F (37.6 °C)  Pulse:  [] 92  Resp:  [16-20] 20  SpO2:  [94 %-99 %] 99 %  BP: (116-177)/() 159/70     Weight: 93 kg (205 lb)  Body mass index is 31.17 kg/m².    Intake/Output Summary (Last 24 hours) at 05/28/17 1539  Last data filed at 05/28/17 0339   Gross per 24 hour   Intake               60 ml   Output             2100 ml   Net            -2040 ml      Physical Exam   Constitutional: She is oriented to person, place, and time. No distress.   Chronically ill appearing   HENT:   Head: Normocephalic and atraumatic.   Eyes: Conjunctivae and EOM are normal. No scleral icterus.   Neck: Normal range of motion. Neck supple. No JVD present. No tracheal deviation present. No  thyromegaly present.   Cardiovascular: Normal rate, regular rhythm, normal heart sounds and intact distal pulses.    No murmur heard.  Pulmonary/Chest: Effort normal and breath sounds normal. No respiratory distress. She has no wheezes. She has no rales. She exhibits no tenderness.   Abdominal: Soft. Bowel sounds are normal. She exhibits no distension. There is tenderness (epigastric).   Suprapubic catheter in place   Musculoskeletal: Normal range of motion. She exhibits no edema or tenderness.   Lymphadenopathy:     She has no cervical adenopathy.   Neurological: She is alert and oriented to person, place, and time. No cranial nerve deficit. She exhibits normal muscle tone. Coordination normal.   Skin: Skin is warm and dry. She is not diaphoretic. No erythema.   Psychiatric: She has a normal mood and affect.   Nursing note and vitals reviewed.      Significant Labs:   Blood Culture: No results for input(s): LABBLOO in the last 48 hours.  CBC:   Recent Labs  Lab 05/27/17  0503 05/28/17  0432   WBC 19.37* 18.61*   HGB 13.1 12.0   HCT 39.4 35.8*    271     CMP:   Recent Labs  Lab 05/27/17  0503 05/28/17  0432    140   K 2.8* 2.5*   * 113*   CO2 19* 18*    99   BUN 9 8   CREATININE 0.7 0.7   CALCIUM 8.1* 7.4*   ANIONGAP 10 9   EGFRNONAA >60 >60     Cardiac Markers: No results for input(s): CKMB, MYOGLOBIN, BNP, TROPISTAT in the last 48 hours.  Urine Studies: No results for input(s): COLORU, APPEARANCEUA, PHUR, SPECGRAV, PROTEINUA, GLUCUA, KETONESU, BILIRUBINUA, OCCULTUA, NITRITE, UROBILINOGEN, LEUKOCYTESUR, RBCUA, WBCUA, BACTERIA, SQUAMEPITHEL, HYALINECASTS in the last 48 hours.    Invalid input(s): WRIGHTSUR  All pertinent labs within the past 24 hours have been reviewed.    Significant Imaging:   Imaging Results          X-Ray Abdomen AP 1 View (Final result)  Result time 05/27/17 15:42:54    Final result by Jax Barnhart MD (05/27/17 15:42:54)                 Impression:     Nonspecific  bowel gas pattern      Electronically signed by: CASTRO RICE MD  Date:     05/27/17  Time:    15:42              Narrative:    History: Abdominal pain    The bowel gas pattern is nonspecific. Clips in the right upper quadrant. There is an IVC filter. There is a bladder catheter.                             X-Ray Chest 1 View for PICC_Central line (Final result)  Result time 05/24/17 07:54:48    Final result by ALISHA Woodward Sr., MD (05/24/17 07:54:48)                 Impression:        1. There has been interval placement of a right PICC. The tip is in the superior vena cava.  2. The lungs are clear.   3. There is anterior and posterior spinal fusion hardware projected over the cervical spine.      Electronically signed by: ALISHA WOODWARD MD  Date:     05/24/17  Time:    07:54              Narrative:    One-view chest x-ray    Clinical History: Chest pain    Finding: Comparison was made to a prior examination performed on 5/23/2017. There has been interval placement of a right PICC. The tip is in the superior vena cava. The size of the heart is normal. The lungs are clear. There is no pneumothorax.  The costophrenic angles are sharp. There is anterior and posterior spinal fusion hardware projected over the cervical spine.                             CT Head Without Contrast (Final result)  Result time 05/23/17 22:14:50    Final result by Rhonda Barkley MD (05/23/17 22:14:50)                 Impression:         CT scan of the brain demonstrates no acute findings or significant interval change.  All CT scans at this facility use dose modulation, iterative reconstruction, and/or weight based dosing when appropriate to reduce radiation dose to as low as reasonable achievable.      Electronically signed by: RHONDA BARKLEY MD  Date:     05/23/17  Time:    22:14              Narrative:    CT HEAD WITHOUT CONTRAST    Date: 05/23/17 21:58:42    Clinical indication:  syncope     Technique:  Standard noncontrast CT  scan of the brain. Comparison is made to previous study of 07/30/2015.     Findings:  The ventricles are nondilated. Gray and white matter structures reveal normal attenuation. No hemorrhage, mass effect or edema is identified.  There is atherosclerosis of intracranial vessels.  The skull and skull base are unremarkable.                            Assessment/Plan:      Hypokalemia    - Replace  -will continue to monitor  -repeat CMP in am           Acute encephalopathy    - likely due to UTI  -Symptoms improved  -neuro checks  - CT head unremarkable          Elevated troponin    - Unclear etiology, but likely suspect sepsis induced  - serial cardiac enzymes with downward trend noted  - Patient denies chest pain or SOB but reports epigastric pain   - EKG showed ST  -Echo showed EF 55% with diastolic dysfunction  - Lovenox 1 mg/kg in ED          Urinary tract infection associated with catheterization of urinary tract    - UTI due to chronic suprapubic catheter in place  - suprapubic catheter changed per Urology  - IV Cefepime, Vanco stopped   - Urine culture NEGATIVE  - blood culture with NGTD          * Sepsis    - Source likely UTI  - WBC trending downward  -IV antibiotics Cefepime, and Flagyl  - Blood and urine culture results with NGTD  - IV fluids  -CT of abdomen showed no acute abnormality identified in the abdomen or pelvis  - chest xray repeated on 5/24/17-unremarkable  - Abdominal pain persists.  Check abdominal XR negative, stool studies pending,Stool negative for C-diff     - Increased Oxycodone to 10 mg and every 4 hours.          VTE Risk Mitigation         Ordered     enoxaparin injection 40 mg  Daily     Route:  Subcutaneous        05/23/17 8442     Place sequential compression device  Until discontinued      05/24/17 0438     Medium Risk of VTE  Once      05/24/17 0023          Liz Nash NP  Department of Hospital Medicine   [facility]

## 2017-05-28 NOTE — ASSESSMENT & PLAN NOTE
- Source likely UTI  - WBC trending downward  -IV antibiotics changed to Vanc,Cefepime, and Flagyl  - Blood and urine culture results with NGTD  - IV fluids  -CT of abdomen showed no acute abnormality identified in the abdomen or pelvis  - chest xray repeated on 5/24/17-unremarkable  - Abdominal pain persists.  Check abdominal XR, stool studies, and continue Cefepime and Flagyl.  Stop Vancomycin.  - Increased Oxycodone to 10 mg and every 4 hours.

## 2017-05-28 NOTE — NURSING
Patient c/o abd pain. Dr. Leary notified by CLAUDIA Patel, Orders received for Morphine 4mg. Will continue to morphine.

## 2017-05-28 NOTE — NURSING
Patient c/o abdominal pain. States she got no relief from morphine. Dr. Leary notified,. Awaiting new orders.

## 2017-05-28 NOTE — PLAN OF CARE
Problem: Patient Care Overview  Goal: Plan of Care Review  Patient stable. Plan of care reviewed. Patient verbalizes understanding. Patient continues to c/o abd pain. IV fluids infusing.Bed low, wheels locked, bed alarm on, call light in reach. Patient instructed to call for assistance. Will continue to monitor.

## 2017-05-28 NOTE — PT/OT/SLP PROGRESS
Physical Therapy  Treatment    Desirae No   MRN: [id number]   Admitting Diagnosis: Sepsis    PT Received On: 05/28/17  PT Start Time: 0815     PT Stop Time: 0838    PT Total Time (min): 23 min       Billable Minutes:  Therapeutic Activity 13 minutes and Therapeutic Exercise 10 minutes    Treatment Type: Treatment  PT/PTA: PTA     PTA Visit Number: 1       General Precautions: Standard, fall  Orthopedic Precautions: N/A   Braces:           Subjective:  Communicated with epic documentation and CLAUDIA Akers prior to session.      Pain/Comfort  Pain Rating 1: 7/10  Location - Side 1: Bilateral  Location 1: abdomen    Objective:   Patient found with: miller catheter, peripheral IV, telemetry    Functional Mobility:  Bed Mobility:   Rolling/Turning to Left: Contact guard assistance  Scooting/Bridging: Contact Guard Assistance  Supine to Sit: Contact Guard Assistance    Transfers:  Sit <> Stand Assistance: Moderate Assistance  Sit <> Stand Assistive Device: No Assistive Device  Bed <> Chair Technique: Stand Pivot  Bed <> Chair Assistance: Moderate Assistance  Bed <> Chair Assistive Device: No Assistive Device    Gait:   Gait Distance:  (2-3 small pivot steps from EOB to chair)  Assistance 1: Moderate assistance  Gait Assistive Device: No device, Hand held assist  Gait Pattern: swing-to gait  Gait Deviation(s): decreased adriel, decreased step length, decreased stride length, decreased weight-shifting ability    Stairs:      Balance:   Static Sit: GOOD: Takes MODERATE challenges from all directions  Dynamic Sit: GOOD-: Maintains balance through MODERATE excursions of active trunk movement,     Static Stand: FAIR: Maintains without assist but unable to take challenges  Dynamic stand: POOR    Therapeutic Activities and Exercises:  Bed mobility, transfers and LE therex     AM-PAC 6 CLICK MOBILITY  How much help from another person does this patient currently need?   1 = Unable, Total/Dependent Assistance  2 = A lot,  Maximum/Moderate Assistance  3 = A little, Minimum/Contact Guard/Supervision  4 = None, Modified [place]/Independent         AM-PAC Raw Score CMS G-Code Modifier Level of Impairment Assistance   6 % Total / Unable   7 - 9 CM 80 - 100% Maximal Assist   10 - 14 CL 60 - 80% Moderate Assist   15 - 19 CK 40 - 60% Moderate Assist   20 - 22 CJ 20 - 40% Minimal Assist   23 CI 1-20% SBA / CGA   24 CH 0% Independent/ Mod I     Patient left up in chair with all lines intact, call button in reach and nursing notified.    Assessment:  Desirae No is a 66 y.o. female with a medical diagnosis of Sepsis and presents with weakness, pain.    Rehab identified problem list/impairments: Rehab identified problem list/impairments: weakness, impaired endurance, impaired functional mobilty, decreased safety awareness, pain, impaired balance    Rehab potential is fair.    Activity tolerance: Good    Discharge recommendations: Discharge Facility/Level Of Care Needs: nursing facility, basic, nursing facility, skilled     Barriers to discharge:      Equipment recommendations:       GOALS:    Physical Therapy Goals        Problem: Physical Therapy Goal    Goal Priority Disciplines Outcome Goal Variances Interventions   Physical Therapy Goal     PT/OT, PT      Description:  LTG'S TO BE MET IN 7 DAYS (6-3-17)  1. PT WILL REQUIRE SBA FOR BED MOBILITY  2. PT WILL REQUIRE CGA FOR TF'S   3. PT WILL ' WITH RW AND CGA  4. PT WILL TOLERATE BLE THEREX X 20 REPS  5. PT WILL DEMO F DYNAMIC BALANCE DURING GAIT                    PLAN:    Patient to be seen 5 x/week  to address the above listed problems via therapeutic exercises, therapeutic activities, gait training  Plan of Care expires: 06/03/17  Plan of Care reviewed with: patient         Levon Meneses, PTA  05/28/2017

## 2017-05-28 NOTE — SUBJECTIVE & OBJECTIVE
Interval History: Patient continue to complain of epigastric pain and diarrhea. Stool negative for C-diff. Renal panel pending.     Review of Systems   Constitutional: Positive for fatigue. Negative for chills, diaphoresis and fever.   HENT: Negative.  Negative for congestion, nosebleeds and sinus pressure.    Eyes: Negative.  Negative for photophobia, redness and visual disturbance.   Respiratory: Negative.  Negative for cough and shortness of breath.    Cardiovascular: Negative.  Negative for chest pain.   Gastrointestinal: Positive for abdominal pain and diarrhea. Negative for nausea and vomiting.   Endocrine: Negative.  Negative for polydipsia, polyphagia and polyuria.   Genitourinary: Negative for dysuria, flank pain, frequency and urgency.   Musculoskeletal: Negative.  Negative for back pain and neck stiffness.   Skin: Negative.  Negative for color change, pallor and rash.   Allergic/Immunologic: Negative.  Negative for environmental allergies, food allergies and immunocompromised state.   Neurological: Negative.  Negative for dizziness, syncope, speech difficulty, numbness and headaches.   Hematological: Negative.  Does not bruise/bleed easily.   Psychiatric/Behavioral: Negative for confusion, decreased concentration and hallucinations. The patient is not nervous/anxious.    All other systems reviewed and are negative.    Objective:     Vital Signs (Most Recent):  Temp: 99.6 °F (37.6 °C) (05/28/17 1200)  Pulse: 92 (05/28/17 1200)  Resp: 20 (05/28/17 1200)  BP: (!) 159/70 (05/28/17 1200)  SpO2: 99 % (05/28/17 1200) Vital Signs (24h Range):  Temp:  [98.1 °F (36.7 °C)-100.3 °F (37.9 °C)] 99.6 °F (37.6 °C)  Pulse:  [] 92  Resp:  [16-20] 20  SpO2:  [94 %-99 %] 99 %  BP: (116-177)/() 159/70     Weight: 93 kg (205 lb)  Body mass index is 31.17 kg/m².    Intake/Output Summary (Last 24 hours) at 05/28/17 6274  Last data filed at 05/28/17 0120   Gross per 24 hour   Intake               60 ml   Output              2100 ml   Net            -2040 ml      Physical Exam   Constitutional: She is oriented to person, place, and time. No distress.   Chronically ill appearing   HENT:   Head: Normocephalic and atraumatic.   Eyes: Conjunctivae and EOM are normal. No scleral icterus.   Neck: Normal range of motion. Neck supple. No JVD present. No tracheal deviation present. No thyromegaly present.   Cardiovascular: Normal rate, regular rhythm, normal heart sounds and intact distal pulses.    No murmur heard.  Pulmonary/Chest: Effort normal and breath sounds normal. No respiratory distress. She has no wheezes. She has no rales. She exhibits no tenderness.   Abdominal: Soft. Bowel sounds are normal. She exhibits no distension. There is tenderness (epigastric).   Suprapubic catheter in place   Musculoskeletal: Normal range of motion. She exhibits no edema or tenderness.   Lymphadenopathy:     She has no cervical adenopathy.   Neurological: She is alert and oriented to person, place, and time. No cranial nerve deficit. She exhibits normal muscle tone. Coordination normal.   Skin: Skin is warm and dry. She is not diaphoretic. No erythema.   Psychiatric: She has a normal mood and affect.   Nursing note and vitals reviewed.      Significant Labs:   Blood Culture: No results for input(s): LABBLOO in the last 48 hours.  CBC:   Recent Labs  Lab 05/27/17  0503 05/28/17  0432   WBC 19.37* 18.61*   HGB 13.1 12.0   HCT 39.4 35.8*    271     CMP:   Recent Labs  Lab 05/27/17  0503 05/28/17  0432    140   K 2.8* 2.5*   * 113*   CO2 19* 18*    99   BUN 9 8   CREATININE 0.7 0.7   CALCIUM 8.1* 7.4*   ANIONGAP 10 9   EGFRNONAA >60 >60     Cardiac Markers: No results for input(s): CKMB, MYOGLOBIN, BNP, TROPISTAT in the last 48 hours.  Urine Studies: No results for input(s): COLORU, APPEARANCEUA, PHUR, SPECGRAV, PROTEINUA, GLUCUA, KETONESU, BILIRUBINUA, OCCULTUA, NITRITE, UROBILINOGEN, LEUKOCYTESUR, RBCUA, WBCUA, BACTERIA,  SQUAMEPITHEL, HYALINECASTS in the last 48 hours.    Invalid input(s): WRIGHTSUR  All pertinent labs within the past 24 hours have been reviewed.    Significant Imaging:   Imaging Results          X-Ray Abdomen AP 1 View (Final result)  Result time 05/27/17 15:42:54    Final result by Jax Rice MD (05/27/17 15:42:54)                 Impression:     Nonspecific bowel gas pattern      Electronically signed by: JAX RICE MD  Date:     05/27/17  Time:    15:42              Narrative:    History: Abdominal pain    The bowel gas pattern is nonspecific. Clips in the right upper quadrant. There is an IVC filter. There is a bladder catheter.                             X-Ray Chest 1 View for PICC_Central line (Final result)  Result time 05/24/17 07:54:48    Final result by ALISHA Woodward Sr., MD (05/24/17 07:54:48)                 Impression:        1. There has been interval placement of a right PICC. The tip is in the superior vena cava.  2. The lungs are clear.   3. There is anterior and posterior spinal fusion hardware projected over the cervical spine.      Electronically signed by: ALISHA WOODWARD MD  Date:     05/24/17  Time:    07:54              Narrative:    One-view chest x-ray    Clinical History: Chest pain    Finding: Comparison was made to a prior examination performed on 5/23/2017. There has been interval placement of a right PICC. The tip is in the superior vena cava. The size of the heart is normal. The lungs are clear. There is no pneumothorax.  The costophrenic angles are sharp. There is anterior and posterior spinal fusion hardware projected over the cervical spine.                             CT Head Without Contrast (Final result)  Result time 05/23/17 22:14:50    Final result by Roosevelt Bunn MD (05/23/17 22:14:50)                 Impression:         CT scan of the brain demonstrates no acute findings or significant interval change.  All CT scans at this facility use dose modulation,  iterative reconstruction, and/or weight based dosing when appropriate to reduce radiation dose to as low as reasonable achievable.      Electronically signed by: RHONDA BARKLEY MD  Date:     05/23/17  Time:    22:14              Narrative:    CT HEAD WITHOUT CONTRAST    Date: 05/23/17 21:58:42    Clinical indication:  syncope     Technique:  Standard noncontrast CT scan of the brain. Comparison is made to previous study of 07/30/2015.     Findings:  The ventricles are nondilated. Gray and white matter structures reveal normal attenuation. No hemorrhage, mass effect or edema is identified.  There is atherosclerosis of intracranial vessels.  The skull and skull base are unremarkable.

## 2017-05-29 VITALS
BODY MASS INDEX: 31.07 KG/M2 | WEIGHT: 205 LBS | DIASTOLIC BLOOD PRESSURE: 87 MMHG | RESPIRATION RATE: 18 BRPM | TEMPERATURE: 100 F | HEIGHT: 68 IN | OXYGEN SATURATION: 98 % | HEART RATE: 102 BPM | SYSTOLIC BLOOD PRESSURE: 161 MMHG

## 2017-05-29 LAB
ANION GAP SERPL CALC-SCNC: 8 MMOL/L
BASOPHILS # BLD AUTO: 0.02 K/UL
BASOPHILS NFR BLD: 0.1 %
BUN SERPL-MCNC: 6 MG/DL
CALCIUM SERPL-MCNC: 7.9 MG/DL
CHLORIDE SERPL-SCNC: 110 MMOL/L
CO2 SERPL-SCNC: 22 MMOL/L
CREAT SERPL-MCNC: 0.7 MG/DL
DIFFERENTIAL METHOD: ABNORMAL
EOSINOPHIL # BLD AUTO: 0.2 K/UL
EOSINOPHIL NFR BLD: 1.5 %
ERYTHROCYTE [DISTWIDTH] IN BLOOD BY AUTOMATED COUNT: 14 %
EST. GFR  (AFRICAN AMERICAN): >60 ML/MIN/1.73 M^2
EST. GFR  (NON AFRICAN AMERICAN): >60 ML/MIN/1.73 M^2
GLUCOSE SERPL-MCNC: 98 MG/DL
HCT VFR BLD AUTO: 32.4 %
HGB BLD-MCNC: 10.9 G/DL
LYMPHOCYTES # BLD AUTO: 2.2 K/UL
LYMPHOCYTES NFR BLD: 13.7 %
MCH RBC QN AUTO: 29.3 PG
MCHC RBC AUTO-ENTMCNC: 33.6 %
MCV RBC AUTO: 87 FL
MONOCYTES # BLD AUTO: 1 K/UL
MONOCYTES NFR BLD: 6.6 %
NEUTROPHILS # BLD AUTO: 12.3 K/UL
NEUTROPHILS NFR BLD: 78.1 %
O+P STL TRI STN: NORMAL
PLATELET # BLD AUTO: 276 K/UL
PMV BLD AUTO: 9.4 FL
POTASSIUM SERPL-SCNC: 2.9 MMOL/L
RBC # BLD AUTO: 3.72 M/UL
SODIUM SERPL-SCNC: 140 MMOL/L
WBC # BLD AUTO: 15.72 K/UL

## 2017-05-29 PROCEDURE — 25000003 PHARM REV CODE 250: Performed by: NURSE PRACTITIONER

## 2017-05-29 PROCEDURE — 80048 BASIC METABOLIC PNL TOTAL CA: CPT

## 2017-05-29 PROCEDURE — 85025 COMPLETE CBC W/AUTO DIFF WBC: CPT

## 2017-05-29 PROCEDURE — 25000003 PHARM REV CODE 250: Performed by: INTERNAL MEDICINE

## 2017-05-29 PROCEDURE — 25000003 PHARM REV CODE 250: Performed by: EMERGENCY MEDICINE

## 2017-05-29 PROCEDURE — 63600175 PHARM REV CODE 636 W HCPCS: Performed by: INTERNAL MEDICINE

## 2017-05-29 PROCEDURE — 97116 GAIT TRAINING THERAPY: CPT

## 2017-05-29 PROCEDURE — 97110 THERAPEUTIC EXERCISES: CPT

## 2017-05-29 PROCEDURE — 63600175 PHARM REV CODE 636 W HCPCS: Performed by: NURSE PRACTITIONER

## 2017-05-29 RX ORDER — ASPIRIN 81 MG/1
81 TABLET ORAL DAILY
COMMUNITY
Start: 2017-05-29

## 2017-05-29 RX ORDER — POTASSIUM CHLORIDE 7.45 MG/ML
20 INJECTION INTRAVENOUS ONCE
Status: COMPLETED | OUTPATIENT
Start: 2017-05-29 | End: 2017-05-29

## 2017-05-29 RX ORDER — POTASSIUM CHLORIDE 750 MG/1
10 TABLET, EXTENDED RELEASE ORAL 2 TIMES DAILY
Qty: 14 TABLET | Refills: 0 | Status: SHIPPED | OUTPATIENT
Start: 2017-05-29 | End: 2017-06-05

## 2017-05-29 RX ORDER — OXYCODONE HYDROCHLORIDE 5 MG/1
15 TABLET ORAL EVERY 4 HOURS PRN
Status: DISCONTINUED | OUTPATIENT
Start: 2017-05-29 | End: 2017-05-29 | Stop reason: HOSPADM

## 2017-05-29 RX ORDER — CIPROFLOXACIN 500 MG/1
500 TABLET ORAL 2 TIMES DAILY
Qty: 10 TABLET | Refills: 0 | Status: SHIPPED | OUTPATIENT
Start: 2017-05-29 | End: 2017-06-03

## 2017-05-29 RX ORDER — METRONIDAZOLE 500 MG/1
500 TABLET ORAL 3 TIMES DAILY
Qty: 15 TABLET | Refills: 0 | Status: SHIPPED | OUTPATIENT
Start: 2017-05-29 | End: 2017-06-03

## 2017-05-29 RX ORDER — POTASSIUM CHLORIDE 20 MEQ/1
20 TABLET, EXTENDED RELEASE ORAL 3 TIMES DAILY
Status: DISCONTINUED | OUTPATIENT
Start: 2017-05-29 | End: 2017-05-29 | Stop reason: HOSPADM

## 2017-05-29 RX ADMIN — SODIUM CHLORIDE, PRESERVATIVE FREE 10 ML: 5 INJECTION INTRAVENOUS at 12:05

## 2017-05-29 RX ADMIN — DICYCLOMINE HYDROCHLORIDE 10 MG: 10 CAPSULE ORAL at 12:05

## 2017-05-29 RX ADMIN — OXYCODONE HYDROCHLORIDE 10 MG: 5 TABLET ORAL at 12:05

## 2017-05-29 RX ADMIN — SODIUM CHLORIDE, PRESERVATIVE FREE 10 ML: 5 INJECTION INTRAVENOUS at 06:05

## 2017-05-29 RX ADMIN — MAGNESIUM OXIDE TAB 400 MG (241.3 MG ELEMENTAL MG) 400 MG: 400 (241.3 MG) TAB at 09:05

## 2017-05-29 RX ADMIN — METRONIDAZOLE 500 MG: 500 INJECTION, SOLUTION INTRAVENOUS at 03:05

## 2017-05-29 RX ADMIN — METRONIDAZOLE 500 MG: 500 INJECTION, SOLUTION INTRAVENOUS at 06:05

## 2017-05-29 RX ADMIN — DICYCLOMINE HYDROCHLORIDE 10 MG: 10 CAPSULE ORAL at 06:05

## 2017-05-29 RX ADMIN — OXYCODONE HYDROCHLORIDE 10 MG: 5 TABLET ORAL at 06:05

## 2017-05-29 RX ADMIN — OXYCODONE HYDROCHLORIDE 15 MG: 5 TABLET ORAL at 01:05

## 2017-05-29 RX ADMIN — CEFEPIME 1 G: 1 INJECTION, POWDER, FOR SOLUTION INTRAMUSCULAR; INTRAVENOUS at 01:05

## 2017-05-29 RX ADMIN — OXYCODONE HYDROCHLORIDE 15 MG: 5 TABLET ORAL at 09:05

## 2017-05-29 RX ADMIN — CEFEPIME 1 G: 1 INJECTION, POWDER, FOR SOLUTION INTRAMUSCULAR; INTRAVENOUS at 12:05

## 2017-05-29 RX ADMIN — POTASSIUM CHLORIDE 20 MEQ: 10 INJECTION, SOLUTION INTRAVENOUS at 09:05

## 2017-05-29 RX ADMIN — POTASSIUM CHLORIDE 20 MEQ: 1500 TABLET, EXTENDED RELEASE ORAL at 01:05

## 2017-05-29 NOTE — PHARMACY MED REC
[facility] Medication Reconciliation  Template    Patient was admitted on 5/23/2017 for Sepsis.      Patient's prior to admission medication regimen was as follows:  Prescriptions Prior to Admission   Medication Sig Dispense Refill Last Dose    oxymorphone (OPANA ER) 40 MG 12 hr tablet Take 40 mg by mouth every 12 (twelve) hours.   5/22/2017    quetiapine (SEROQUEL) 400 MG tablet Take 400 mg by mouth every evening.   5/22/2017    tizanidine (ZANAFLEX) 4 MG tablet    5/22/2017    clonazepam (KLONOPIN) 2 MG Tab Take 2 mg by mouth 2 (two) times daily.   5/21/2017    oxybutynin (DITROPAN) 5 MG Tab Take 5 mg by mouth 2 (two) times daily.    5/22/2017    oxycodone (ROXICODONE) 15 MG Tab Take 30 mg by mouth every 4 (four) hours as needed.    5/21/2017    promethazine (PHENERGAN) 25 MG tablet Take 1 tablet (25 mg total) by mouth every 6 (six) hours as needed for Nausea. 15 tablet 0 Unknown    [DISCONTINUED] cefUROXime (CEFTIN) 500 MG tablet Take 1 tablet (500 mg total) by mouth 2 (two) times daily. 14 tablet 0 Unknown         Please add appropriate    SmartPhrase below:    Discharge Medication Reconciliation - Pharmacy Consult Note     The discharge medication regimen was reviewed by Daily Horton, PharmD. The following medications have been adjusted/changed:     Patient is to INITIATE the following medications   · Ciprofloxacin 500mg PO BID x 5 days, treatment of Sepsis d/t UTI   · Potassium Chloride 10mEq PO BID x 7 days, hypokalemia  · Metronidazole 500mg PO TID x 5 days , treatment of Sepsis d/t UTI    The following issues/concerns were addressed prior to discharge:   Discharge medication counseling   · Counseled the patient on new medications that she would be initiating following discharge. Educated patient on administration instructions, length of therapy, potential adverse effects, and indication.   · Asked patient if she had any questions or concerns regarding medications. Patient denied having any  questions or concerns.     Medication affordability/Need for prescriptions written   · Patient denied having any problems with affordability. Patient stated that she is compliant with her medications and [employer] also shows that patient is compliant.    · Patient states that she does not need new prescriptions written at this time for her home medications.       I discussed all of the patient's home medications with her and verified current doses and administration. No discrepancies noted.     Daily Horton, [place]

## 2017-05-29 NOTE — DISCHARGE SUMMARY
[facility] Hospital Medicine  Discharge Summary      Patient Name: Desirae No  MRN: [id number]  Admission Date: 5/23/2017  Hospital Length of Stay: 6 days  Discharge Date and Time:  05/29/2017 11:56 AM  Attending Physician: Ellis Cruz MD   Discharging Provider: Liz Nash NP  Primary Care Provider: Fauzia Muro MD      HPI:   Ms. No is a 65 y/o  female with h/o neurogenic bladder, has chronically in-dwelling suprapubic catheter, presented to the ED for second time since this mroning for worsening confusion. Last night she presented with abdominal pain, negative CT abdomen, WBC was 18,000, UA was abnormal and discharged from ED on cefuroxime orally. However over the course of the day, she progressively got more confused and was brought back to the ED. WBC increased to 28,000, troponin increased to 0.111 from 0.024. Denies chest pain or SOB. She received one dose of Lovenox 1 mg/kg in the ED.    * No surgery found *      Indwelling Lines/Drains at time of discharge:   Lines/Drains/Airways     Peripherally Inserted Central Catheter Line                 PICC Double Lumen 05/24/17 0050 right basilic 5 days          Drain                 Urethral Catheter 05/23/17 2100 5 days              Hospital Course:   Pt admitted to Telemetry Unit for Sepsis likely due to UTI.  Pt treated with IV antibiotics with blood culture and urine culture sent for analysis.  WBC trending down.  Chest xray unremarkable. CT of Abdomen showed no acute findings.  Flagyl added to antibiotic regime.  Urology consulted and suprapubic catheter changed.  Potassium repleted as needed. Stool for C-diff negative. Blood and Stool cultures show no growth to date. WBCs trending downward. X-ray of abdomen revealed nonspecific bowel gas pattern. PT/OT evaluation ordered.Symptoms improved.  Patient seen and examined on date of discharge and found suitable for discharge. Patient will be discharged with  5 days of Cipro and Flagyl by mouth. Patient will follow-up with PCP in 3 days.        Consults:   Consults         Status Ordering Provider     Inpatient consult to PICC team (NIAS)  Once     Provider:  (Not yet assigned)    Acknowledged JENNY HALE     Inpatient consult to Urology  Once     Provider:  Edgar Shah IV, MD    Completed ALEXANDRIA SARAVIA     Pharmacy to dose Vancomycin consult  Once     Provider:  (Not yet assigned)    Acknowledged ALEXANDRIA SARAVIA          Significant Diagnostic Studies: Labs:   BMP:   Recent Labs  Lab 05/28/17  0432 05/28/17  1735 05/29/17  0615   GLU 99 101 98    141 140   K 2.5* 3.0* 2.9*   * 111* 110   CO2 18* 22* 22*   BUN 8 8 6*   CREATININE 0.7 0.7 0.7   CALCIUM 7.4* 8.0* 7.9*   MG 1.8  --   --    , CMP   Recent Labs  Lab 05/28/17  0432 05/28/17  1735 05/29/17  0615    141 140   K 2.5* 3.0* 2.9*   * 111* 110   CO2 18* 22* 22*   GLU 99 101 98   BUN 8 8 6*   CREATININE 0.7 0.7 0.7   CALCIUM 7.4* 8.0* 7.9*   ALBUMIN  --  2.7*  --    ANIONGAP 9 8 8   ESTGFRAFRICA >60 >60 >60   EGFRNONAA >60 >60 >60   , CBC   Recent Labs  Lab 05/28/17  0432 05/29/17  0615   WBC 18.61* 15.72*   HGB 12.0 10.9*   HCT 35.8* 32.4*    276    and All labs within the past 24 hours have been reviewed    Pending Diagnostic Studies:     Procedure Component Value Units Date/Time    Stool Exam-Ova,Cysts,Parasites [[id number]] Collected:  05/27/17 1655    Order Status:  Sent Lab Status:  In process Updated:  05/28/17 0849    Specimen:  Stool from Stool         Final Active Diagnoses:    Diagnosis Date Noted POA    PRINCIPAL PROBLEM:  Sepsis [A41.9] 05/23/2017 Yes    Urinary tract infection associated with catheterization of urinary tract [T83.511A, N39.0] 05/23/2017 Yes    Elevated troponin [R74.8] 05/23/2017 Yes    Acute encephalopathy [G93.40] 05/23/2017 Yes    Hypokalemia [E87.6] 05/23/2017 Yes      Problems Resolved During this Admission:    Diagnosis Date Noted Date  Resolved POA      Discharged Condition: stable    Disposition: Home or Self Care    Follow Up:  Follow-up Information     Fauzia Muro MD In 3 days.    Specialty:  Family Medicine  Why:  hospital follow-up   Contact information:  [address] [address] [address] [phone]             Edgar Shah IV, MD In 2 weeks.    Specialty:  Urology  Why:  hospital follow-up   Contact information:  [address]  [facility] [address] [phone]                 Patient Instructions:     Diet general     Activity as tolerated     Call MD for:  temperature >100.4     Call MD for:  persistent nausea and vomiting or diarrhea     Call MD for:  severe uncontrolled pain     Call MD for:  difficulty breathing or increased cough     Call MD for:  severe persistent headache     Call MD for:  persistent dizziness, light-headedness, or visual disturbances     Call MD for:  increased confusion or weakness       Medications:  Reconciled Home Medications:   Current Discharge Medication List      START taking these medications    Details   ciprofloxacin HCl (CIPRO) 500 MG tablet Take 1 tablet (500 mg total) by mouth 2 (two) times daily.  Qty: 10 tablet, Refills: 0      metronidazole (FLAGYL) 500 MG tablet Take 1 tablet (500 mg total) by mouth 3 (three) times daily.  Qty: 15 tablet, Refills: 0      potassium chloride SA (K-DUR,KLOR-CON) 10 MEQ tablet Take 1 tablet (10 mEq total) by mouth 2 (two) times daily.  Qty: 14 tablet, Refills: 0         CONTINUE these medications which have CHANGED    Details   aspirin (ECOTRIN) 81 MG EC tablet Take 1 tablet (81 mg total) by mouth once daily.         CONTINUE these medications which have NOT CHANGED    Details   oxymorphone (OPANA ER) 40 MG 12 hr tablet Take 40 mg by mouth every 12 (twelve) hours.      quetiapine (SEROQUEL) 400 MG tablet Take 400 mg by mouth every evening.      tizanidine (ZANAFLEX) 4 MG tablet       clonazepam (KLONOPIN) 2 MG Tab Take 2 mg by mouth 2  (two) times daily.      oxybutynin (DITROPAN) 5 MG Tab Take 5 mg by mouth 2 (two) times daily.       oxycodone (ROXICODONE) 15 MG Tab Take 30 mg by mouth every 4 (four) hours as needed.       promethazine (PHENERGAN) 25 MG tablet Take 1 tablet (25 mg total) by mouth every 6 (six) hours as needed for Nausea.  Qty: 15 tablet, Refills: 0         STOP taking these medications       cefUROXime (CEFTIN) 500 MG tablet Comments:   Reason for Stopping:             Time spent on the discharge of patient: 45 minutes    Liz Nash NP  Department of Hospital Medicine  [facility]

## 2017-05-29 NOTE — PLAN OF CARE
Problem: Patient Care Overview  Goal: Plan of Care Review  Outcome: Ongoing (interventions implemented as appropriate)  SBA FOR BED MOBILITY, JOCELYNN FOR TF'S AND GAIT 5' FWD AND 5' BWD USING RW

## 2017-05-29 NOTE — PLAN OF CARE
Problem: Patient Care Overview  Goal: Plan of Care Review  Outcome: Ongoing (interventions implemented as appropriate)  Patient updated on POC, demonstrated teach back  Pain managed with medication  Monitor sr-st  VSS   Patient turned q2 hours  Fall precautions in place,bed locked,lowest position;call bell within reach;skidproof socks  Bed alarm  Pt remained free from falls; no resp. Distress noted

## 2017-05-29 NOTE — PLAN OF CARE
Problem: Patient Care Overview  Goal: Plan of Care Review  Outcome: Ongoing (interventions implemented as appropriate)  Pt discharged at this time, no distress noted, no c/o pain at this time, sinus tachycardia, PICC line removed, pt tolerated well, pt instructed to hold breath during procedure, pressure applied to site, heart monitor removed, spouse at bedside attending to ADLS, suprapubic catheter remain intact, discharge instructions given and acknowledged by pt and spouse.

## 2017-05-29 NOTE — PT/OT/SLP PROGRESS
Physical Therapy  Treatment    Desirae No   MRN: [id number]   Admitting Diagnosis: Sepsis    PT Received On: 05/29/17  PT Start Time: 0750     PT Stop Time: 0815    PT Total Time (min): 25 min       Billable Minutes:  Gait [id number] and Therapeutic Exercise 10    Treatment Type: Treatment  PT/PTA: PT         General Precautions: Standard, fall  Orthopedic Precautions: N/A   Braces: N/A    Subjective:  Communicated with NURSE JACKIE prior to session.  Pain/Comfort  Pain Rating 1: 8/10  Location 1: abdomen    Objective:   Patient found with: miller catheter, telemetry, peripheral IV    Functional Mobility:  Bed Mobility:   Rolling/Turning to Left: Stand by assistance  Rolling/Turning Right: Stand by assistance  Scooting/Bridging: Stand by Assistance  Supine to Sit: Stand by Assistance (SLOW MOVING WITH BED MOBILITY BUT TIME ALLOWED FOR ACTIVE PARTICIPATION)    Transfers:  Sit <> Stand Assistance: Minimum Assistance  Sit <> Stand Assistive Device: Rolling Walker  Bed <> Chair Technique: Stand Pivot  Bed <> Chair Assistance: Minimum Assistance  Bed <> Chair Assistive Device: Rolling Walker    Gait:   Gait Distance: PT AMB 5' FWD AND 5' BWD WITH RW AND JOCELYNN, PT C/O DIZZINESS SO LIMITED DISTANCE, CUES FOR UPRIGHT POSTURE AND RW SAFETY  Assistance 1: Minimum assistance  Gait Assistive Device: Rolling walker  Gait Pattern: swing-through gait  Gait Deviation(s): decreased adreil, decreased step length, decreased toe-to-floor clearance, decreased weight-shifting ability    Balance:   Static Sit: FAIR  Dynamic Sit: FAIR  Static Stand: POOR+  Dynamic stand:  POOR+    Therapeutic Activities and Exercises:  PT WASH/DRY FACE, BRUSHED HAIR.  PT EDUCATED IN AND PERFORMED BLE THEREX X 20 REPS AROM WITH REST.  PT SET UP FOR BRUSHING TEETH AND EATING BREAKFAST    AM-PAC 6 CLICK MOBILITY  How much help from another person does this patient currently need?   1 = Unable, Total/Dependent Assistance  2 = A lot, Maximum/Moderate  Assistance  3 = A little, Minimum/Contact Guard/Supervision  4 = None, Modified [place]/Independent    Turning over in bed (including adjusting bedclothes, sheets and blankets)?: 4  Sitting down on and standing up from a chair with arms (e.g., wheelchair, bedside commode, etc.): 3  Moving from lying on back to sitting on the side of the bed?: 4  Moving to and from a bed to a chair (including a wheelchair)?: 3  Need to walk in hospital room?: 3  Climbing 3-5 steps with a railing?: 1 (NT)  Total Score: 18    AM-PAC Raw Score CMS G-Code Modifier Level of Impairment Assistance   6 % Total / Unable   7 - 9 CM 80 - 100% Maximal Assist   10 - 14 CL 60 - 80% Moderate Assist   15 - 19 CK 40 - 60% Moderate Assist   20 - 22 CJ 20 - 40% Minimal Assist   23 CI 1-20% SBA / CGA   24 CH 0% Independent/ Mod I     Patient left up in chair with all lines intact, call button in reach and NURSE notified.    Assessment:  Desirae No is a 66 y.o. female with a medical diagnosis of Sepsis   PT WILL BENEFIT FROM CONT. SKILLED P.T. TO ADDRESS IMPAIRMENTS    Rehab identified problem list/impairments: Rehab identified problem list/impairments: weakness, impaired endurance, impaired functional mobilty, impaired balance, decreased safety awareness, pain    Rehab potential is good.    Activity tolerance: Fair    Discharge recommendations: Discharge Facility/Level Of Care Needs: nursing facility, skilled     Barriers to discharge: Barriers to Discharge: None    Equipment recommendations: Equipment Needed After Discharge: none     GOALS:    Physical Therapy Goals        Problem: Physical Therapy Goal    Goal Priority Disciplines Outcome Goal Variances Interventions   Physical Therapy Goal     PT/OT, PT Ongoing (interventions implemented as appropriate)     Description:  LTG'S TO BE MET IN 7 DAYS (6-3-17)  1. PT WILL REQUIRE SBA FOR BED MOBILITY  2. PT WILL REQUIRE CGA FOR TF'S   3. PT WILL ' WITH RW AND CGA  4. PT WILL  TOLERATE BLE THEREX X 20 REPS  5. PT WILL DEMO F DYNAMIC BALANCE DURING GAIT                  PLAN:    Patient to be seen 5 x/week  to address the above listed problems via gait training, therapeutic activities, therapeutic exercises  Plan of Care expires: 06/03/17  Plan of Care reviewed with: patient    PT ENCOURAGED TO CALL FOR ASSISTANCE WITH ALL NEEDS DUE TO FALL RISK STATUS, PT AGREEABLE.  PT ENCOURAGED TO INCREASE TIME OOB IN CHAIR, ALL MEALS IN CHAIR OOB, PT AGREEABLE    Natalia Sheth, PT  05/29/2017

## 2017-05-29 NOTE — PROGRESS NOTES
Pt discharged home via w/c, no distress noted, pleasant mood, spouse with pt, belongings with them.

## 2017-05-31 ENCOUNTER — NURSE TRIAGE (OUTPATIENT)
Dept: ADMINISTRATIVE | Facility: CLINIC | Age: 67
End: 2017-05-31

## 2017-05-31 ENCOUNTER — PATIENT OUTREACH (OUTPATIENT)
Dept: ADMINISTRATIVE | Facility: CLINIC | Age: 67
End: 2017-05-31
Payer: MEDICARE

## 2017-05-31 LAB — BACTERIA STL CULT: NORMAL

## 2017-05-31 NOTE — PATIENT INSTRUCTIONS
Understanding Sepsis  Sepsis is a severe response the body has to an infection. It is most often caused by bacteria. It is also known as septicemia, or systemic inflammatory response syndrome (SIRS). Sepsis is a medical emergency. It needs to be treated right away.  What is sepsis?  Sepsis is when the body reacts to an infection with a severe inflammatory response. It can be caused by bacteria, fungus, or a virus. Sepsis can cause many kinds of problems around the body. It can lead severe low blood pressure (shock) and organ failure. This can lead to death if not treated.  Sepsis is most common in:  · Infants and older adults  · People with an infection such as pneumonia, meningitis, or a urinary tract infection  · People who have an illness such as cancer, AIDS, or diabetes  · People being treated with chemotherapy medications or radiation  · People who have had a transplant  Symptoms of sepsis  Symptoms of sepsis can include:  · Chills and shaking  · Rapid heartbeat  · Rapid breathing  · Shortness of breath  · Severe nausea or uncontrolled vomiting  · Confusion  · Dizziness  · Decreased urination  · Severe pain, including in the back or joints   Diagnosing sepsis  If your health care provider thinks you may have sepsis, you will be given tests. You may have blood and urine tests. These are done to look for bacteria, viruses, or fungus. You may also have X-rays or other imaging tests. These may be done to look at your organs to locate the source of infection.  Treating sepsis  If you have sepsis, your health care provider will give you antibiotics through a thin, flexible tube put into a vein in your arm (IV). You will also be given fluids through the IV. You may also be given nutrition or other medications through your IV. Your health care provider will talk with you about other treatments you may need. These may include using an oxygen mask or a ventilator to help with breathing. Treatment may last at least 7  to 10 days. Sepsis must be treated in the hospital.  Date Last Reviewed: 7/15/2015  © 4899-9759 [employer]. [address] [place] [address], [place] [address]. All rights reserved. This information is not intended as a substitute for professional medical care. Always follow your healthcare professional's instructions.

## 2017-05-31 NOTE — TELEPHONE ENCOUNTER
"    Reason for Disposition   MODERATE weakness (i.e., interferes with work, school, normal activities) and persists > 3 days    Protocols used: ST WEAKNESS (GENERALIZED) AND FATIGUE-A-OH    I called and spoke with the patient for a "transition of care" call following her recent discharge from [facility] with Sepsis.  Pt stated she was weak and was weaker than when she left the hospital.  She also indicated that she had black diarrhea yesterday and also had it while she was in the hospital.  No diarrhea episodes since yesterday.  She stated she was "tired of feeling this way." I heard  in the background say that she is not walking.  I instructed pt that she needed to be seen today. I offered to make the appointment now, but she refused appointment.  She stated that she did not want to be seen today and the  nurse was coming out later today to change out her suprapubic catheter.  I told the pt that I would call in a few hours to check on her.  "

## 2017-06-28 ENCOUNTER — TELEPHONE (OUTPATIENT)
Dept: FAMILY MEDICINE | Facility: CLINIC | Age: 67
End: 2017-06-28

## 2017-06-28 NOTE — TELEPHONE ENCOUNTER
Spoke with pt's  he informed me that pt is vomiting and diarrhea, stated she did not want to come for a appt and just wanted something called in. Informed pt's  that pt has not been seen since 3/6/16 and that she would need a appointment. Pt's  stated that he cannot get her out of the house in less by ambulance. Informed  I will inform Dr. Li

## 2017-06-28 NOTE — TELEPHONE ENCOUNTER
----- Message from Adelita Mcfarland sent at 6/28/2017  9:43 AM CDT -----  Contact: pt's   He's calling because he states that pt is vomiting and has diarrhea, pt doesn't want to come in but would like a RX for vomiting and diarrhea sent to [facility] in [place], please advise [phone] (home)  [phone] (cell)

## 2017-08-25 ENCOUNTER — LAB VISIT (OUTPATIENT)
Dept: LAB | Facility: HOSPITAL | Age: 67
End: 2017-08-25
Attending: FAMILY MEDICINE
Payer: MEDICARE

## 2017-08-25 ENCOUNTER — OFFICE VISIT (OUTPATIENT)
Dept: FAMILY MEDICINE | Facility: CLINIC | Age: 67
End: 2017-08-25
Payer: MEDICARE

## 2017-08-25 VITALS
HEART RATE: 100 BPM | BODY MASS INDEX: 31.15 KG/M2 | SYSTOLIC BLOOD PRESSURE: 138 MMHG | TEMPERATURE: 97 F | RESPIRATION RATE: 14 BRPM | HEIGHT: 68 IN | WEIGHT: 205.56 LBS | OXYGEN SATURATION: 97 % | DIASTOLIC BLOOD PRESSURE: 89 MMHG

## 2017-08-25 DIAGNOSIS — K30 DELAYED GASTRIC EMPTYING: ICD-10-CM

## 2017-08-25 DIAGNOSIS — N39.0 URINARY TRACT INFECTION ASSOCIATED WITH CYSTOSTOMY CATHETER, INITIAL ENCOUNTER: Primary | ICD-10-CM

## 2017-08-25 DIAGNOSIS — R11.2 NON-INTRACTABLE VOMITING WITH NAUSEA, UNSPECIFIED VOMITING TYPE: ICD-10-CM

## 2017-08-25 DIAGNOSIS — A41.50 SEPSIS DUE TO GRAM NEGATIVE BACTERIA: ICD-10-CM

## 2017-08-25 DIAGNOSIS — E86.1 HYPOVOLEMIA: ICD-10-CM

## 2017-08-25 DIAGNOSIS — R19.7 DIARRHEA, UNSPECIFIED: ICD-10-CM

## 2017-08-25 DIAGNOSIS — E87.6 HYPOKALEMIA: ICD-10-CM

## 2017-08-25 DIAGNOSIS — T83.510A URINARY TRACT INFECTION ASSOCIATED WITH CYSTOSTOMY CATHETER, INITIAL ENCOUNTER: Primary | ICD-10-CM

## 2017-08-25 DIAGNOSIS — I70.0 ATHEROSCLEROSIS OF AORTA: ICD-10-CM

## 2017-08-25 DIAGNOSIS — E66.9 OBESITY (BMI 30-39.9): ICD-10-CM

## 2017-08-25 DIAGNOSIS — Z93.59 SUPRAPUBIC CATHETER: ICD-10-CM

## 2017-08-25 DIAGNOSIS — G89.4 CHRONIC PAIN SYNDROME: ICD-10-CM

## 2017-08-25 PROBLEM — A41.9 SEPSIS: Status: RESOLVED | Noted: 2017-05-23 | Resolved: 2017-08-25

## 2017-08-25 LAB — POTASSIUM SERPL-SCNC: 4.5 MMOL/L

## 2017-08-25 PROCEDURE — 84132 ASSAY OF SERUM POTASSIUM: CPT

## 2017-08-25 PROCEDURE — 99999 PR PBB SHADOW E&M-EST. PATIENT-LVL III: CPT | Mod: PBBFAC,,, | Performed by: FAMILY MEDICINE

## 2017-08-25 PROCEDURE — 1159F MED LIST DOCD IN RCRD: CPT | Mod: S$GLB,,, | Performed by: FAMILY MEDICINE

## 2017-08-25 PROCEDURE — 3075F SYST BP GE 130 - 139MM HG: CPT | Mod: S$GLB,,, | Performed by: FAMILY MEDICINE

## 2017-08-25 PROCEDURE — 36415 COLL VENOUS BLD VENIPUNCTURE: CPT | Mod: PO

## 2017-08-25 PROCEDURE — 3008F BODY MASS INDEX DOCD: CPT | Mod: S$GLB,,, | Performed by: FAMILY MEDICINE

## 2017-08-25 PROCEDURE — 99499 UNLISTED E&M SERVICE: CPT | Mod: S$GLB,,, | Performed by: FAMILY MEDICINE

## 2017-08-25 PROCEDURE — 99214 OFFICE O/P EST MOD 30 MIN: CPT | Mod: S$GLB,,, | Performed by: FAMILY MEDICINE

## 2017-08-25 PROCEDURE — 3079F DIAST BP 80-89 MM HG: CPT | Mod: S$GLB,,, | Performed by: FAMILY MEDICINE

## 2017-08-25 PROCEDURE — 1126F AMNT PAIN NOTED NONE PRSNT: CPT | Mod: S$GLB,,, | Performed by: FAMILY MEDICINE

## 2017-08-25 RX ORDER — POLYETHYLENE GLYCOL 3350 17 G/17G
17 POWDER, FOR SOLUTION ORAL
COMMUNITY
Start: 2017-08-13 | End: 2023-09-28

## 2017-08-25 RX ORDER — MIRTAZAPINE 30 MG/1
TABLET, FILM COATED ORAL
COMMUNITY
Start: 2017-08-14 | End: 2018-06-13

## 2017-08-25 RX ORDER — PANTOPRAZOLE SODIUM 40 MG/1
40 TABLET, DELAYED RELEASE ORAL DAILY
Qty: 30 TABLET | Refills: 1 | Status: SHIPPED | OUTPATIENT
Start: 2017-08-25 | End: 2018-06-13

## 2017-08-25 RX ORDER — OXYCODONE HYDROCHLORIDE 30 MG/1
30 TABLET ORAL EVERY 6 HOURS PRN
Refills: 0 | COMMUNITY
Start: 2017-08-14 | End: 2023-09-28

## 2017-08-25 RX ORDER — CIPROFLOXACIN 250 MG/1
TABLET, FILM COATED ORAL
COMMUNITY
Start: 2017-08-14 | End: 2017-09-27

## 2017-08-25 RX ORDER — OXYCODONE 27 MG/1
CAPSULE, EXTENDED RELEASE ORAL
Refills: 0 | COMMUNITY
Start: 2017-08-14 | End: 2023-09-26

## 2017-08-25 NOTE — PROGRESS NOTES
Subjective:       Patient ID: Desirae No is a 66 y.o. female.    Chief Complaint: Hospital Follow Up    HPI   Hospital Follow Up  65yo female presents today for follow-up after hospital follow-up. She was admitted to [facility] in [place] 8/7/17-8/13/17 secondary to sepsis attributed to urinary tract infection. Patient has neurogenic bladder with a suprapubic catheter in place. She was admitted with abdominal pain, nausea and vomiting. She was reportedly febrile. Workup demonstrated concern for sepsis and wbc count was elevated at 25,000. She was aggressive treated with Zosyn and then antibiotics were tailored according to culture findings. At the time of discharge her white count had decreased to 9,000. Cultures were positive for Citrobacter and Myroides species. She has been placed on prophylactic Cipro (reports she was previously on Bactrim). She has been given a course of Levaquin as well. She is scheduled to see Urology for follow-up as an outpatient and has been advised to see GI ([facility]) but does not currently have an appointment pending. She is followed by Dr. Guzman for pain management- there have been no recent changes to her medication regimen.    Review of Systems   Constitutional: Positive for fatigue. Negative for appetite change and unexpected weight change.   HENT: Negative for congestion, ear pain and sinus pressure.    Respiratory: Positive for cough. Negative for shortness of breath.    Cardiovascular: Negative for chest pain, palpitations and leg swelling.   Gastrointestinal: Positive for nausea and vomiting. Negative for abdominal pain and diarrhea (improved).   Genitourinary: Negative for decreased urine volume and hematuria.   Musculoskeletal: Positive for arthralgias and back pain.   Skin: Negative for rash.   Neurological: Negative for dizziness, weakness and headaches.   Psychiatric/Behavioral: Negative for dysphoric mood and sleep disturbance. The patient is not nervous/anxious.   "      Objective:   /89   Pulse 100   Temp 96.9 °F (36.1 °C) (Temporal)   Resp 14   Ht 5' 8" (1.727 m)   Wt 93.2 kg (205 lb 9.3 oz)   LMP 04/24/1974   SpO2 97%   BMI 31.26 kg/m²   Physical Exam   Constitutional: She is oriented to person, place, and time. She appears well-developed. No distress.   Obese, non-toxic   HENT:   Head: Normocephalic and atraumatic.   Right Ear: External ear normal.   Left Ear: External ear normal.   Nose: Nose normal.   Mouth/Throat: Oropharynx is clear and moist.   Eyes: Conjunctivae and EOM are normal. Pupils are equal, round, and reactive to light.   Neck: Normal range of motion. Neck supple.   Cardiovascular: Normal rate, regular rhythm and normal heart sounds.    Pulmonary/Chest: Effort normal and breath sounds normal.   Abdominal: Soft. Bowel sounds are normal.   Musculoskeletal: She exhibits no edema.   Ambulating with a walker   Neurological: She is alert and oriented to person, place, and time.   Skin: Skin is warm and dry. She is not diaphoretic.   Psychiatric: She has a normal mood and affect. Her behavior is normal.       Assessment:       1. Urinary tract infection associated with cystostomy catheter, initial encounter    2. Sepsis due to Gram negative bacteria    3. Non-intractable vomiting with nausea, unspecified vomiting type    4. Diarrhea, unspecified    5. Hypovolemia    6. Delayed gastric emptying    7. Hypokalemia    8. Atherosclerosis of aorta    9. Chronic pain syndrome    10. Obesity (BMI 30-39.9)    11. Suprapubic catheter        Plan:   Urinary tract infection associated with cystostomy catheter, initial encounter  Hospital records have been reviewed with the patient. Advised continued use of Cipro at baseline and completion of Levaquin as prescribed. Continue with suprapubic catheter care and changes per HH every 2 weeks. See Urology as planned.  Sepsis due to Gram negative bacteria  Resolved. Reviewed labs at time of discharge. Discussed s/s of " sepsis in detail. Advised close monitoring and need for assessment should symptoms evolve.  Non-intractable vomiting with nausea, unspecified vomiting type  On PPI therapy until she can be assessed by GI. She will make her own appointment with [facility]. Will provide Protonix renewal in the interim.  -     pantoprazole (PROTONIX) 40 MG tablet; Take 1 tablet (40 mg total) by mouth once daily.  Dispense: 30 tablet; Refill: 1  Diarrhea, unspecified  Resolved per patient.  Hypovolemia  Resolved.  Delayed gastric emptying  -     pantoprazole (PROTONIX) 40 MG tablet; Take 1 tablet (40 mg total) by mouth once daily.  Dispense: 30 tablet; Refill: 1  Hypokalemia  Patient with chronic issues with hypokalemia. She is taking otc K supplementation presently. Will assess updated levels and provide additional recommendations pending results.  -     Potassium; Future; Expected date: 08/25/2017  Atherosclerosis of aorta  BP and lipid control is advised.  Chronic pain syndrome  Continue with current measures as per pain management.  Obesity (BMI 30-39.9)  Weight loss efforts remain encouraged through diet and lifestyle measures.  Suprapubic catheter  Continue with care as per Urology.    Reviewed treatment plan and spent time today counseling with regard to medication use. Have advised need for close assessment per Urology. Recommend continued suprapubic catheter care. Home health ([facility]) is providing nursing assistance as well as PT. She will continue until discharge goals are met. She will make her own appt with [facility] and see Urology as planned. All questions/concerns have been addressed.    Total visit time of 25 minutes with greater than half the visit time dedicated to counseling and coordinating care.

## 2017-09-12 ENCOUNTER — HOSPITAL ENCOUNTER (EMERGENCY)
Facility: HOSPITAL | Age: 67
Discharge: HOME | End: 2017-09-12
Attending: EMERGENCY MEDICINE
Payer: MEDICARE

## 2017-09-12 VITALS
TEMPERATURE: 99 F | BODY MASS INDEX: 30.62 KG/M2 | DIASTOLIC BLOOD PRESSURE: 64 MMHG | HEIGHT: 68 IN | WEIGHT: 202 LBS | RESPIRATION RATE: 19 BRPM | HEART RATE: 103 BPM | SYSTOLIC BLOOD PRESSURE: 124 MMHG

## 2017-09-12 DIAGNOSIS — I95.9 HYPOTENSION, UNSPECIFIED HYPOTENSION TYPE: Primary | ICD-10-CM

## 2017-09-12 PROCEDURE — 99283 EMERGENCY DEPT VISIT LOW MDM: CPT

## 2017-09-13 ENCOUNTER — OFFICE VISIT (OUTPATIENT)
Dept: FAMILY MEDICINE | Facility: CLINIC | Age: 67
End: 2017-09-13
Payer: MEDICARE

## 2017-09-13 VITALS
OXYGEN SATURATION: 98 % | TEMPERATURE: 98 F | HEART RATE: 100 BPM | SYSTOLIC BLOOD PRESSURE: 130 MMHG | RESPIRATION RATE: 14 BRPM | DIASTOLIC BLOOD PRESSURE: 70 MMHG | HEIGHT: 68 IN | WEIGHT: 208.44 LBS | BODY MASS INDEX: 31.59 KG/M2

## 2017-09-13 DIAGNOSIS — E66.9 OBESITY (BMI 30-39.9): ICD-10-CM

## 2017-09-13 DIAGNOSIS — N76.4 ABSCESS OF LABIA MAJORA: ICD-10-CM

## 2017-09-13 DIAGNOSIS — N90.7 SEBACEOUS CYST OF LABIA: ICD-10-CM

## 2017-09-13 DIAGNOSIS — L30.9 DERMATITIS: ICD-10-CM

## 2017-09-13 DIAGNOSIS — N31.9 NEUROGENIC BLADDER: ICD-10-CM

## 2017-09-13 DIAGNOSIS — Z93.59 SUPRAPUBIC CATHETER: ICD-10-CM

## 2017-09-13 DIAGNOSIS — I95.9 HYPOTENSION, UNSPECIFIED HYPOTENSION TYPE: Primary | ICD-10-CM

## 2017-09-13 DIAGNOSIS — I70.0 ATHEROSCLEROSIS OF AORTA: ICD-10-CM

## 2017-09-13 PROBLEM — A41.50 SEPSIS DUE TO GRAM NEGATIVE BACTERIA: Status: RESOLVED | Noted: 2017-08-25 | Resolved: 2017-09-13

## 2017-09-13 PROCEDURE — 1159F MED LIST DOCD IN RCRD: CPT | Mod: S$GLB,,, | Performed by: FAMILY MEDICINE

## 2017-09-13 PROCEDURE — 99999 PR PBB SHADOW E&M-EST. PATIENT-LVL IV: CPT | Mod: PBBFAC,,, | Performed by: FAMILY MEDICINE

## 2017-09-13 PROCEDURE — 3075F SYST BP GE 130 - 139MM HG: CPT | Mod: S$GLB,,, | Performed by: FAMILY MEDICINE

## 2017-09-13 PROCEDURE — 3078F DIAST BP <80 MM HG: CPT | Mod: S$GLB,,, | Performed by: FAMILY MEDICINE

## 2017-09-13 PROCEDURE — 3008F BODY MASS INDEX DOCD: CPT | Mod: S$GLB,,, | Performed by: FAMILY MEDICINE

## 2017-09-13 PROCEDURE — 99499 UNLISTED E&M SERVICE: CPT | Mod: S$GLB,,, | Performed by: FAMILY MEDICINE

## 2017-09-13 PROCEDURE — 99214 OFFICE O/P EST MOD 30 MIN: CPT | Mod: S$GLB,,, | Performed by: FAMILY MEDICINE

## 2017-09-13 PROCEDURE — 1126F AMNT PAIN NOTED NONE PRSNT: CPT | Mod: S$GLB,,, | Performed by: FAMILY MEDICINE

## 2017-09-13 RX ORDER — MUPIROCIN 20 MG/G
OINTMENT TOPICAL 3 TIMES DAILY
Qty: 30 G | Refills: 0 | Status: SHIPPED | OUTPATIENT
Start: 2017-09-13 | End: 2017-09-23

## 2017-09-13 NOTE — ED PROVIDER NOTES
SCRIBE #1 NOTE: I, Narcisa Harp, am scribing for, and in the presence of, Emir Mayo MD. I have scribed the entire note.      History      Chief Complaint   Patient presents with    Hypotension     76/46 at home. reports around 615.        Review of patient's allergies indicates:   Allergen Reactions    Nucynta [tapentadol] Nausea And Vomiting    Prochlorperazine Nausea And Vomiting    Stadol [butorphanol tartrate] Nausea And Vomiting    Reglan [metoclopramide hcl]     Toradol [ketorolac]     Zofran [ondansetron hcl (pf)]         HPI   HPI    9/12/2017, 10:07 PM   History obtained from the  and patient      History of Present Illness: Desirae No is a 66 y.o. female patient with HTN who presents to the Emergency Department for hypotension which onset gradually around 6 PM tonight. She reports that she began to feel tired around 6 PM and decided to check her BP. Pt reports her BP was in the 70s/40s. She states that she just came to ED to get her blood pressure checked and to go home. She refuses any treatments and any lab work at this time. She has an appt with her PCP tomorrow. Her BP is 126/66 in ED. Symptoms are improving and moderate in severity. No mitigating or exacerbating factors reported. No associated sxs reported. Patient denies fever, chills, CP, SOB, N/V, dizziness, HA, extremity weakness/numbness, and all other sxs at this time. She does not take HTN meds. She takes narcotic pain medication at home. No further complaints or concerns at this time.       Arrival mode: Personal vehicle    PCP: Fauzia Muro MD       Past Medical History:  Past Medical History:   Diagnosis Date    DDD (degenerative disc disease), lumbar 4/11/2014    Hypertension     Neurogenic bladder     Pulmonary embolism 4/11/2014       Past Surgical History:  Past Surgical History:   Procedure Laterality Date    CERVICAL FUSION      CHOLECYSTECTOMY      HYSTERECTOMY  1974    menorrhagia    JOINT  REPLACEMENT      neck fusion           Family History:  Family History   Problem Relation Age of Onset    Stroke Father        Social History:  Social History     Social History Main Topics    Smoking status: Never Smoker    Smokeless tobacco: Never Used    Alcohol use No    Drug use: No    Sexual activity: Yes     Partners: Male     Birth control/ protection: None, Surgical       ROS   Review of Systems   Constitutional: Negative for chills, diaphoresis and fever.   HENT: Negative for sore throat.    Respiratory: Negative for shortness of breath.    Cardiovascular: Negative for chest pain, palpitations and leg swelling.        (+) hypotension   Gastrointestinal: Negative for nausea and vomiting.   Genitourinary: Negative for dysuria.   Musculoskeletal: Negative for back pain.   Skin: Negative for rash.   Neurological: Negative for dizziness, facial asymmetry, speech difficulty, weakness, light-headedness, numbness and headaches.   Hematological: Does not bruise/bleed easily.   All other systems reviewed and are negative.      Physical Exam      Initial Vitals   BP Pulse Resp Temp SpO2   09/12/17 2124 09/12/17 2124 09/12/17 2124 09/12/17 2125 --   126/66 103 19 99.4 °F (37.4 °C)       MAP       09/12/17 2124       86          Physical Exam  Nursing Notes and Vital Signs Reviewed.  Constitutional: Patient is in no acute distress. Awake and alert. Well-developed and well-nourished.  Head: Atraumatic. Normocephalic.  Eyes: PERRL. EOM intact. Conjunctivae are not pale. No scleral icterus.  ENT: Mucous membranes are moist. Oropharynx is clear and symmetric.    Neck: Supple. Full ROM. No lymphadenopathy.  Cardiovascular: Regular rate. Regular rhythm. No murmurs, rubs, or gallops. Distal pulses are 2+ and symmetric.  Pulmonary/Chest: No respiratory distress. Clear to auscultation bilaterally. No wheezing, rales, or rhonchi.  Abdominal: Soft and non-distended.  There is no tenderness.  No rebound, guarding, or  "rigidity.  Good bowel sounds.   Musculoskeletal: Moves all extremities. No obvious deformities. No edema. No calf tenderness.  Skin: Warm and dry.  Neurological:  Alert, awake, and appropriate.  Normal speech.  No acute focal neurological deficits are appreciated.  Psychiatric: Normal affect. Good eye contact. Appropriate in content.    ED Course    Procedures  ED Vital Signs:  Vitals:    09/12/17 2124 09/12/17 2125 09/12/17 2223   BP: 126/66  124/64   Pulse: 103     Resp: 19     Temp:  99.4 °F (37.4 °C)    TempSrc: Oral Oral    Weight:  91.6 kg (202 lb)    Height:  5' 8" (1.727 m)      The Emergency Provider reviewed the vital signs and test results, which are outlined above.    ED Discussion     10:08 PM: Reassessed pt at this time. Pt states her condition has improved at this time. Discussed pt dx and plan of tx. Informed pt to follow up with PCP. All questions and concerns were addressed at this time. Pt expresses understanding of information and instructions, and is comfortable with plan to discharge. Pt is stable for discharge.    I discussed with patient that evaluation in the ED does not suggest any emergent or life threatening medical conditions requiring immediate intervention beyond what was provided in the ED, and I believe patient is safe for discharge.  Regardless, an unremarkable evaluation in the ED does not preclude the development or presence of a serious of life threatening condition. As such, patient was instructed to return immediately for any worsening or change in current symptoms.      ED Medication(s):  Medications - No data to display    Discharge Medication List as of 9/12/2017 10:08 PM          Follow-up Information     Fauzia Muro MD In 1 day.    Specialty:  Family Medicine  Contact information:  [address] [address] [address]  [phone]                    Medical Decision Making              Scribe Attestation:   Scribe #1: I performed the above scribed " service and the documentation accurately describes the services I performed. I attest to the accuracy of the note.    Attending:   Physician Attestation Statement for Scribe #1: I, Emir Mayo MD, personally performed the services described in this documentation, as scribed by Narcisa Harp, in my presence, and it is both accurate and complete.          Clinical Impression       ICD-10-CM ICD-9-CM   1. Hypotension, unspecified hypotension type I95.9 458.9       Disposition:   Disposition: Discharged  Condition: Stable         Emir Mayo MD  09/13/17 0553

## 2017-09-15 ENCOUNTER — TELEPHONE (OUTPATIENT)
Dept: FAMILY MEDICINE | Facility: CLINIC | Age: 67
End: 2017-09-15

## 2017-09-15 NOTE — TELEPHONE ENCOUNTER
Spoke with patient's /pt is asleep  Appointment scheduled for next week instructions given  Bring med list/ MD names /EyE  Doctor's names

## 2017-09-15 NOTE — TELEPHONE ENCOUNTER
I spoke with patient and she will come prepared for her visit next week with list of medications,Dr's names and Eye Doctor's names

## 2017-09-15 NOTE — TELEPHONE ENCOUNTER
----- Message from Abbie Rios sent at 9/15/2017 11:45 AM CDT -----  Contact: Pt   Pt is returning a missed call.  Please advise [phone] (home)

## 2017-09-15 NOTE — TELEPHONE ENCOUNTER
Please schedule patient for wellness assessment within the next month- advise she come fasting to the visit for updated lab monitoring. Let her know this is a preventive visit and we will be catching up on outstanding health maintenance- it is not a problem visit during which we will address acute complaints. I want to make sure we have an updated medicine list and a list of all her health care providers including eye doctor. Let me know if there are any questions.

## 2017-09-27 ENCOUNTER — OFFICE VISIT (OUTPATIENT)
Dept: FAMILY MEDICINE | Facility: CLINIC | Age: 67
End: 2017-09-27
Payer: MEDICARE

## 2017-09-27 DIAGNOSIS — L02.211 CUTANEOUS ABSCESS OF ABDOMINAL WALL: Primary | ICD-10-CM

## 2017-09-27 DIAGNOSIS — I70.0 ATHEROSCLEROSIS OF AORTA: ICD-10-CM

## 2017-09-27 DIAGNOSIS — Z93.59 SUPRAPUBIC CATHETER: ICD-10-CM

## 2017-09-27 DIAGNOSIS — L03.311 CELLULITIS OF RIGHT ABDOMINAL WALL: ICD-10-CM

## 2017-09-27 DIAGNOSIS — N39.0 ACUTE UTI: ICD-10-CM

## 2017-09-27 PROCEDURE — 87186 SC STD MICRODIL/AGAR DIL: CPT

## 2017-09-27 PROCEDURE — 3008F BODY MASS INDEX DOCD: CPT | Mod: S$GLB,,, | Performed by: FAMILY MEDICINE

## 2017-09-27 PROCEDURE — 87077 CULTURE AEROBIC IDENTIFY: CPT

## 2017-09-27 PROCEDURE — 1159F MED LIST DOCD IN RCRD: CPT | Mod: S$GLB,,, | Performed by: FAMILY MEDICINE

## 2017-09-27 PROCEDURE — 1126F AMNT PAIN NOTED NONE PRSNT: CPT | Mod: S$GLB,,, | Performed by: FAMILY MEDICINE

## 2017-09-27 PROCEDURE — 99999 PR PBB SHADOW E&M-EST. PATIENT-LVL III: CPT | Mod: PBBFAC,,, | Performed by: FAMILY MEDICINE

## 2017-09-27 PROCEDURE — 99214 OFFICE O/P EST MOD 30 MIN: CPT | Mod: S$GLB,,, | Performed by: FAMILY MEDICINE

## 2017-09-27 PROCEDURE — 87070 CULTURE OTHR SPECIMN AEROBIC: CPT

## 2017-09-27 PROCEDURE — 99499 UNLISTED E&M SERVICE: CPT | Mod: S$GLB,,, | Performed by: FAMILY MEDICINE

## 2017-09-27 RX ORDER — CEFDINIR 300 MG/1
CAPSULE ORAL
COMMUNITY
Start: 2017-09-26 | End: 2018-04-30 | Stop reason: ALTCHOICE

## 2017-09-27 RX ORDER — SULFAMETHOXAZOLE AND TRIMETHOPRIM 800; 160 MG/1; MG/1
TABLET ORAL
COMMUNITY
Start: 2017-09-26 | End: 2018-04-30

## 2017-09-27 RX ORDER — MUPIROCIN 20 MG/G
OINTMENT TOPICAL
COMMUNITY
Start: 2017-09-13 | End: 2018-04-30 | Stop reason: ALTCHOICE

## 2017-09-27 NOTE — PROGRESS NOTES
"Subjective:       Patient ID: Desirae No is a 67 y.o. female.    Chief Complaint: ER Follow-up    HPI   ER Follow-up  66yo female presents today for assessment after a visit to [facility] in [place] (ER) yesterday. She notes development of an abscess to the right lower abdominal wall 6 days ago with progressive increase in size. There has been surrounding redness as well and the area is in close proximity to her suprapubic catheter. She underwent I&D with packing in place at the time of ER assessment. She was placed on a combination of Bactrim and Omnicef. She reports there was discussion of admission but she was discharged home after receiving Vanc and Zosyn IV. Patient has home health. She will need packing removed in 1 day. No fever is reported.     Review of Systems   Constitutional: Negative for chills, fatigue and fever.   Respiratory: Negative for chest tightness and shortness of breath.    Cardiovascular: Negative for palpitations and leg swelling.   Gastrointestinal: Negative for abdominal distention, abdominal pain, constipation, diarrhea and nausea.   Genitourinary: Negative for decreased urine volume and hematuria.   Musculoskeletal: Positive for arthralgias. Negative for myalgias.   Skin: Positive for wound. Negative for color change.   Neurological: Negative for weakness and headaches.   Psychiatric/Behavioral: Negative for sleep disturbance. The patient is nervous/anxious.        Objective:   /89   Pulse 100   Temp 98.2 °F (36.8 °C) (Temporal)   Resp 14   Ht 5' 7" (1.702 m)   Wt 95.1 kg (209 lb 12.3 oz)   LMP 04/24/1974   SpO2 96%   BMI 32.85 kg/m²   Physical Exam   Constitutional: She is oriented to person, place, and time. She appears well-developed. No distress.   Obese, non-toxic   HENT:   Head: Normocephalic and atraumatic.   Right Ear: External ear normal.   Left Ear: External ear normal.   Nose: Nose normal.   Mouth/Throat: Oropharynx is clear and moist.   Eyes: Conjunctivae and EOM " are normal. Pupils are equal, round, and reactive to light.   Neck: Normal range of motion. Neck supple.   Cardiovascular: Normal rate, regular rhythm and normal heart sounds.    Pulmonary/Chest: Effort normal and breath sounds normal.   Abdominal: Soft. Bowel sounds are normal.   Genitourinary:   Genitourinary Comments: Suprapubic catheter in place   Musculoskeletal: She exhibits no edema.   Neurological: She is alert and oriented to person, place, and time.   Skin: Skin is warm and dry. She is not diaphoretic. There is erythema.        Psychiatric: She has a normal mood and affect.       Assessment:       1. Cutaneous abscess of abdominal wall    2. Cellulitis of right abdominal wall    3. Acute UTI    4. Suprapubic catheter    5. Atherosclerosis of aorta        Plan:   Cutaneous abscess of abdominal wall  Reviewed limited notes available through [facility] from [facility] in [place]. Patient notes considerable increase in cellulitis since the ER visit. Wound is very concerning in appearance. Wound cultures have been obtained. After some discussion regarding the appearance and given close proximity to the suprapubic catheter she has been advised to proceed to [facility] in [place] (ER) for reassessment. Anticipate admission with IV antibiotics. All questions/concerns have been addressed. Packing has not been removed from the wound. She will follow-up pending this assessment.  -     CULTURE, AEROBIC  (SPECIFY SOURCE)    Cellulitis of right abdominal wall  Proceed to ER as above.    Acute UTI  Proceed to ER as above.    Suprapubic catheter  As per #3.    Atherosclerosis of aorta  BP and lipid control remains advised.

## 2017-09-29 VITALS
SYSTOLIC BLOOD PRESSURE: 138 MMHG | DIASTOLIC BLOOD PRESSURE: 89 MMHG | WEIGHT: 209.75 LBS | BODY MASS INDEX: 32.92 KG/M2 | TEMPERATURE: 98 F | RESPIRATION RATE: 14 BRPM | OXYGEN SATURATION: 96 % | HEART RATE: 100 BPM | HEIGHT: 67 IN

## 2017-09-30 LAB — BACTERIA SPEC AEROBE CULT: NORMAL

## 2017-12-26 ENCOUNTER — TELEPHONE (OUTPATIENT)
Dept: FAMILY MEDICINE | Facility: CLINIC | Age: 67
End: 2017-12-26

## 2017-12-26 NOTE — TELEPHONE ENCOUNTER
I spoke with the home health nurse /Pam . She states patient went to [facility] or [facility] for elevated  Blood Pressure. She is not sure which hospital but patient does not go to [facility]. I called patient's home and  Spoke with the daughter and they are not sure which hospital the patient went to for evaluation for elevated BP

## 2017-12-26 NOTE — TELEPHONE ENCOUNTER
----- Message from Dane Angulo sent at 12/26/2017 12:12 PM CST -----  Contact: moe jara/ [facility]  She's calling in regards to the pt have extremely high blood pressure: 184/100 and 172/120 within the last 10 min, please advise, [phone] (cell)

## 2018-04-30 ENCOUNTER — OFFICE VISIT (OUTPATIENT)
Dept: FAMILY MEDICINE | Facility: CLINIC | Age: 68
End: 2018-04-30
Payer: MEDICARE

## 2018-04-30 VITALS
SYSTOLIC BLOOD PRESSURE: 134 MMHG | HEART RATE: 95 BPM | RESPIRATION RATE: 15 BRPM | DIASTOLIC BLOOD PRESSURE: 86 MMHG | OXYGEN SATURATION: 96 % | WEIGHT: 221.69 LBS | TEMPERATURE: 98 F | BODY MASS INDEX: 34.72 KG/M2

## 2018-04-30 DIAGNOSIS — G89.4 CHRONIC PAIN SYNDROME: ICD-10-CM

## 2018-04-30 DIAGNOSIS — L85.3 XEROSIS OF SKIN: ICD-10-CM

## 2018-04-30 DIAGNOSIS — E66.9 OBESITY (BMI 30-39.9): ICD-10-CM

## 2018-04-30 DIAGNOSIS — Z93.59 SUPRAPUBIC CATHETER: ICD-10-CM

## 2018-04-30 DIAGNOSIS — N76.4 LABIAL ABSCESS: Primary | ICD-10-CM

## 2018-04-30 DIAGNOSIS — E55.9 VITAMIN D INSUFFICIENCY: ICD-10-CM

## 2018-04-30 DIAGNOSIS — I70.0 ATHEROSCLEROSIS OF AORTA: ICD-10-CM

## 2018-04-30 DIAGNOSIS — L30.9 DERMATITIS: ICD-10-CM

## 2018-04-30 PROCEDURE — 3079F DIAST BP 80-89 MM HG: CPT | Mod: CPTII,S$GLB,, | Performed by: FAMILY MEDICINE

## 2018-04-30 PROCEDURE — 99999 PR PBB SHADOW E&M-EST. PATIENT-LVL III: CPT | Mod: PBBFAC,,, | Performed by: FAMILY MEDICINE

## 2018-04-30 PROCEDURE — 3075F SYST BP GE 130 - 139MM HG: CPT | Mod: CPTII,S$GLB,, | Performed by: FAMILY MEDICINE

## 2018-04-30 PROCEDURE — 99214 OFFICE O/P EST MOD 30 MIN: CPT | Mod: S$GLB,,, | Performed by: FAMILY MEDICINE

## 2018-04-30 PROCEDURE — 99499 UNLISTED E&M SERVICE: CPT | Mod: S$PBB,,, | Performed by: FAMILY MEDICINE

## 2018-04-30 RX ORDER — LEVOCETIRIZINE DIHYDROCHLORIDE 5 MG/1
5 TABLET, FILM COATED ORAL NIGHTLY
Qty: 30 TABLET | Refills: 1 | Status: SHIPPED | OUTPATIENT
Start: 2018-04-30 | End: 2018-06-13

## 2018-04-30 RX ORDER — TRIAMCINOLONE ACETONIDE 1 MG/G
CREAM TOPICAL 2 TIMES DAILY
Qty: 15 G | Refills: 0 | Status: SHIPPED | OUTPATIENT
Start: 2018-04-30 | End: 2018-06-13

## 2018-04-30 RX ORDER — SULFAMETHOXAZOLE AND TRIMETHOPRIM 800; 160 MG/1; MG/1
1 TABLET ORAL 2 TIMES DAILY
Qty: 14 TABLET | Refills: 0 | Status: SHIPPED | OUTPATIENT
Start: 2018-04-30 | End: 2018-05-07

## 2018-04-30 NOTE — PROGRESS NOTES
Subjective:       Patient ID: Desirae No is a 67 y.o. female.    Chief Complaint: Rash    Rash   This is a new problem. The current episode started 1 to 4 weeks ago. The problem has been gradually worsening since onset. Location: bilateral arms and perineum. The rash is characterized by pain, redness, itchiness and dryness. She was exposed to nothing. Pertinent negatives include no congestion, cough, diarrhea, fatigue, fever, rhinorrhea, shortness of breath or sore throat. Treatments tried: warm compress application to perineum; aveeno to the upper extremities. The treatment provided no relief.   Patient reports separate concerns. She has dry and itching skin to the upper extremities not alleviated with topical Aveeno application. She also reports pain to the perineum that is worse with sitting. She has suprapubic catheter in place. No recent antibiotic use. Denies any pelvic pain or vaginal discharge. Use of warm compresses has not afforded benefit.    Review of Systems   Constitutional: Negative for chills, fatigue and fever.   HENT: Negative for congestion, ear pain, rhinorrhea and sore throat.    Eyes: Negative for visual disturbance.   Respiratory: Negative for cough and shortness of breath.    Cardiovascular: Negative for chest pain and palpitations.   Gastrointestinal: Negative for abdominal pain, diarrhea and nausea.   Genitourinary: Negative for difficulty urinating, pelvic pain and vaginal discharge.   Musculoskeletal: Positive for arthralgias and back pain. Negative for myalgias.        Ambulates with a rolling walker   Skin: Positive for rash.   Neurological: Negative for dizziness and weakness.   Psychiatric/Behavioral: Negative for dysphoric mood and sleep disturbance.       Objective:   /86   Pulse 95   Temp 98.1 °F (36.7 °C) (Temporal)   Resp 15   Wt 100.5 kg (221 lb 10.8 oz)   LMP 04/24/1974   SpO2 96%   BMI 34.72 kg/m²   Physical Exam   Constitutional: She is oriented to person,  place, and time. She appears well-developed and well-nourished. No distress.   Obese, uncomfortable appearing, non-toxic   HENT:   Head: Normocephalic and atraumatic.   Right Ear: External ear normal.   Left Ear: External ear normal.   Nose: Nose normal.   Mouth/Throat: Oropharynx is clear and moist.   Eyes: Conjunctivae and EOM are normal. Pupils are equal, round, and reactive to light.   Neck: Normal range of motion. Neck supple.   Cardiovascular: Normal rate, regular rhythm and normal heart sounds.    Pulmonary/Chest: Effort normal and breath sounds normal.   Abdominal: Soft. Bowel sounds are normal.   Genitourinary:       There is no rash or tenderness on the right labia. There is tenderness on the left labia. There is no rash on the left labia.   Genitourinary Comments: Suprapubic cath in place   Musculoskeletal: She exhibits no edema.   Neurological: She is alert and oriented to person, place, and time.   Skin: Skin is warm and dry. She is not diaphoretic.        Xerosis of skin   Psychiatric: She has a normal mood and affect. Her behavior is normal.       Assessment:       1. Labial abscess    2. Suprapubic catheter    3. Xerosis of skin    4. Dermatitis    5. Vitamin D insufficiency    6. Chronic pain syndrome    7. Atherosclerosis of aorta    8. Obesity (BMI 30-39.9)        Plan:      Labial abscess  Recommend starting Bactrim and continuation of warm compress application. See GYN tomorrow for further assessment.  -     sulfamethoxazole-trimethoprim 800-160mg (BACTRIM DS) 800-160 mg Tab; Take 1 tablet by mouth 2 (two) times daily.  Dispense: 14 tablet; Refill: 0  -     CBC auto differential; Future; Expected date: 04/30/2018  -     Comprehensive metabolic panel; Future; Expected date: 04/30/2018    Suprapubic catheter  Continue as per Urology.    Xerosis of skin  Continue with emollient use. Trial of nightly Xyzal to help with pruritis.  -     levocetirizine (XYZAL) 5 MG tablet; Take 1 tablet (5 mg total)  by mouth every evening.  Dispense: 30 tablet; Refill: 1    Dermatitis  Sparing topical use of Kenalog given pruritis. May need to see Dermatology if no improvement.  -     triamcinolone acetonide 0.1% (KENALOG) 0.1 % cream; Apply topically 2 (two) times daily.  Dispense: 15 g; Refill: 0    Vitamin D insufficiency  -     Vitamin D; Future; Expected date: 04/30/2018    Chronic pain syndrome  Continue as per Pain Management    Atherosclerosis of aorta  -     Lipid panel; Future; Expected date: 04/30/2018    Obesity (BMI 30-39.9)  Weight loss is encouraged.

## 2018-05-01 ENCOUNTER — PROCEDURE VISIT (OUTPATIENT)
Dept: OBSTETRICS AND GYNECOLOGY | Facility: CLINIC | Age: 68
End: 2018-05-01
Payer: MEDICARE

## 2018-05-01 VITALS — HEIGHT: 67 IN | BODY MASS INDEX: 34.26 KG/M2 | WEIGHT: 218.25 LBS

## 2018-05-01 DIAGNOSIS — N76.2 VULVAR CELLULITIS: Primary | ICD-10-CM

## 2018-05-01 PROCEDURE — 99213 OFFICE O/P EST LOW 20 MIN: CPT | Mod: S$GLB,,, | Performed by: OBSTETRICS & GYNECOLOGY

## 2018-05-01 PROCEDURE — 99499 UNLISTED E&M SERVICE: CPT | Mod: S$PBB,,, | Performed by: OBSTETRICS & GYNECOLOGY

## 2018-05-01 NOTE — PROGRESS NOTES
Subjective:       Patient ID: Desirae No is a 67 y.o. female.    Chief Complaint:  Procedure (abcess)      History of Present Illness  HPI  Pt was referred by PCP for evaluation of possible vulvar abscess.  Pt reports feeling tender swelling on her left labia that initially increased in sized over past 2 weeks but has started to decrease in size over past 1-2 days.  Pt was seen by PCP yesterday and was started on regimen of oral Bactrim.  Denies drainage or fevers.    GYN & OB History  Patient's last menstrual period was 1974.   Date of Last Pap: No result found    OB History    Para Term  AB Living   1 1 1     1   SAB TAB Ectopic Multiple Live Births           1      # Outcome Date GA Lbr Boni/2nd Weight Sex Delivery Anes PTL Lv   1 Term      Vag-Spont   CYNDY          Review of Systems  Review of Systems   Constitutional: Negative for activity change, appetite change, fatigue, fever and unexpected weight change.   Respiratory: Negative for shortness of breath.    Cardiovascular: Negative for chest pain, palpitations and leg swelling.   Gastrointestinal: Negative for abdominal pain, bloating, blood in stool, constipation, diarrhea, nausea and vomiting.   Genitourinary: Positive for genital sores. Negative for flank pain, hematuria, pelvic pain, vaginal bleeding, vaginal discharge, vaginal pain, urinary incontinence and vaginal odor.   Musculoskeletal: Negative for back pain.   Neurological: Negative for syncope and headaches.           Objective:    Physical Exam:   Constitutional: She is oriented to person, place, and time. She appears well-developed and well-nourished. No distress.       Cardiovascular: Normal rate and regular rhythm.     Pulmonary/Chest: Effort normal.        Abdominal: Soft. Bowel sounds are normal. She exhibits no distension. There is no tenderness.     Genitourinary: Vagina normal.       Pelvic exam was performed with patient supine. There is no rash, tenderness,  lesion or injury on the right labia. There is tenderness and lesion on the left labia. There is no rash or injury on the left labia. No erythema, tenderness or bleeding in the vagina. No foreign body in the vagina. No signs of injury around the vagina. No vaginal discharge found. Vaginal cuff normal.  Genitourinary Comments: Suprapubic catheter in place draining clear yellow urine           Musculoskeletal: Normal range of motion and moves all extremeties. She exhibits no edema or tenderness.      Lymphadenopathy:        Right: No inguinal adenopathy present.        Left: No inguinal adenopathy present.    Neurological: She is alert and oriented to person, place, and time.    Skin: Skin is warm and dry.    Psychiatric: She has a normal mood and affect. Her behavior is normal. Thought content normal.          Assessment:        1. Vulvar cellulitis              Plan:      Vulvar cellulitis  -     Area of cellulitis noted without identifiable abscess.  Pt is already on appropriate antibiotic coverage.  Continue with Bactrim.  Sitz bath with Epsom salts daily.        Follow-up in about 2 weeks (around 5/15/2018).

## 2018-05-09 ENCOUNTER — PATIENT OUTREACH (OUTPATIENT)
Dept: ADMINISTRATIVE | Facility: HOSPITAL | Age: 68
End: 2018-05-09

## 2018-06-13 ENCOUNTER — LAB VISIT (OUTPATIENT)
Dept: LAB | Facility: HOSPITAL | Age: 68
End: 2018-06-13
Attending: FAMILY MEDICINE
Payer: MEDICARE

## 2018-06-13 ENCOUNTER — OFFICE VISIT (OUTPATIENT)
Dept: FAMILY MEDICINE | Facility: CLINIC | Age: 68
End: 2018-06-13
Payer: MEDICARE

## 2018-06-13 VITALS
SYSTOLIC BLOOD PRESSURE: 120 MMHG | RESPIRATION RATE: 16 BRPM | BODY MASS INDEX: 33.69 KG/M2 | HEIGHT: 67 IN | TEMPERATURE: 97 F | OXYGEN SATURATION: 96 % | WEIGHT: 214.63 LBS | HEART RATE: 89 BPM | DIASTOLIC BLOOD PRESSURE: 80 MMHG

## 2018-06-13 DIAGNOSIS — I70.0 ATHEROSCLEROSIS OF AORTA: ICD-10-CM

## 2018-06-13 DIAGNOSIS — Z93.59 SUPRAPUBIC CATHETER: ICD-10-CM

## 2018-06-13 DIAGNOSIS — N76.4 LABIAL ABSCESS: ICD-10-CM

## 2018-06-13 DIAGNOSIS — K21.9 GASTROESOPHAGEAL REFLUX DISEASE, ESOPHAGITIS PRESENCE NOT SPECIFIED: ICD-10-CM

## 2018-06-13 DIAGNOSIS — E55.9 VITAMIN D INSUFFICIENCY: ICD-10-CM

## 2018-06-13 DIAGNOSIS — N31.9 NEUROGENIC BLADDER: ICD-10-CM

## 2018-06-13 DIAGNOSIS — Z12.31 VISIT FOR SCREENING MAMMOGRAM: ICD-10-CM

## 2018-06-13 DIAGNOSIS — G90.50 RSD (REFLEX SYMPATHETIC DYSTROPHY): ICD-10-CM

## 2018-06-13 DIAGNOSIS — Z00.00 ANNUAL PHYSICAL EXAM: Primary | ICD-10-CM

## 2018-06-13 DIAGNOSIS — Z23 NEED FOR VACCINATION WITH 13-POLYVALENT PNEUMOCOCCAL CONJUGATE VACCINE: ICD-10-CM

## 2018-06-13 DIAGNOSIS — L29.9 PRURITUS: ICD-10-CM

## 2018-06-13 DIAGNOSIS — Z78.0 POST-MENOPAUSE: ICD-10-CM

## 2018-06-13 DIAGNOSIS — G89.4 CHRONIC PAIN SYNDROME: ICD-10-CM

## 2018-06-13 DIAGNOSIS — E66.9 OBESITY (BMI 30-39.9): ICD-10-CM

## 2018-06-13 LAB
25(OH)D3+25(OH)D2 SERPL-MCNC: 13 NG/ML
ALBUMIN SERPL BCP-MCNC: 4.2 G/DL
ALP SERPL-CCNC: 93 U/L
ALT SERPL W/O P-5'-P-CCNC: 13 U/L
ANION GAP SERPL CALC-SCNC: 12 MMOL/L
AST SERPL-CCNC: 18 U/L
BASOPHILS # BLD AUTO: 0.02 K/UL
BASOPHILS NFR BLD: 0.2 %
BILIRUB SERPL-MCNC: 0.5 MG/DL
BUN SERPL-MCNC: 8 MG/DL
CALCIUM SERPL-MCNC: 10.2 MG/DL
CHLORIDE SERPL-SCNC: 103 MMOL/L
CHOLEST SERPL-MCNC: 227 MG/DL
CHOLEST/HDLC SERPL: 3.9 {RATIO}
CO2 SERPL-SCNC: 24 MMOL/L
CREAT SERPL-MCNC: 0.9 MG/DL
DIFFERENTIAL METHOD: NORMAL
EOSINOPHIL # BLD AUTO: 0.3 K/UL
EOSINOPHIL NFR BLD: 3.6 %
ERYTHROCYTE [DISTWIDTH] IN BLOOD BY AUTOMATED COUNT: 13.1 %
EST. GFR  (AFRICAN AMERICAN): >60 ML/MIN/1.73 M^2
EST. GFR  (NON AFRICAN AMERICAN): >60 ML/MIN/1.73 M^2
GLUCOSE SERPL-MCNC: 85 MG/DL
HCT VFR BLD AUTO: 46 %
HDLC SERPL-MCNC: 58 MG/DL
HDLC SERPL: 25.6 %
HGB BLD-MCNC: 14.7 G/DL
IMM GRANULOCYTES # BLD AUTO: 0.02 K/UL
IMM GRANULOCYTES NFR BLD AUTO: 0.2 %
LDLC SERPL CALC-MCNC: 142.6 MG/DL
LYMPHOCYTES # BLD AUTO: 1.8 K/UL
LYMPHOCYTES NFR BLD: 20.2 %
MCH RBC QN AUTO: 29.3 PG
MCHC RBC AUTO-ENTMCNC: 32 G/DL
MCV RBC AUTO: 92 FL
MONOCYTES # BLD AUTO: 0.7 K/UL
MONOCYTES NFR BLD: 8.1 %
NEUTROPHILS # BLD AUTO: 6.1 K/UL
NEUTROPHILS NFR BLD: 67.7 %
NONHDLC SERPL-MCNC: 169 MG/DL
NRBC BLD-RTO: 0 /100 WBC
PLATELET # BLD AUTO: 323 K/UL
PMV BLD AUTO: 10.1 FL
POTASSIUM SERPL-SCNC: 4.2 MMOL/L
PROT SERPL-MCNC: 8 G/DL
RBC # BLD AUTO: 5.02 M/UL
SODIUM SERPL-SCNC: 139 MMOL/L
TRIGL SERPL-MCNC: 132 MG/DL
WBC # BLD AUTO: 9.06 K/UL

## 2018-06-13 PROCEDURE — 85025 COMPLETE CBC W/AUTO DIFF WBC: CPT

## 2018-06-13 PROCEDURE — 80053 COMPREHEN METABOLIC PANEL: CPT

## 2018-06-13 PROCEDURE — 90670 PCV13 VACCINE IM: CPT | Mod: 59,S$GLB,, | Performed by: FAMILY MEDICINE

## 2018-06-13 PROCEDURE — 99499 UNLISTED E&M SERVICE: CPT | Mod: S$PBB,,, | Performed by: FAMILY MEDICINE

## 2018-06-13 PROCEDURE — 82306 VITAMIN D 25 HYDROXY: CPT

## 2018-06-13 PROCEDURE — 3074F SYST BP LT 130 MM HG: CPT | Mod: CPTII,S$GLB,, | Performed by: FAMILY MEDICINE

## 2018-06-13 PROCEDURE — 80061 LIPID PANEL: CPT

## 2018-06-13 PROCEDURE — 99397 PER PM REEVAL EST PAT 65+ YR: CPT | Mod: S$GLB,,, | Performed by: FAMILY MEDICINE

## 2018-06-13 PROCEDURE — G0009 ADMIN PNEUMOCOCCAL VACCINE: HCPCS | Mod: S$GLB,,, | Performed by: FAMILY MEDICINE

## 2018-06-13 PROCEDURE — 3079F DIAST BP 80-89 MM HG: CPT | Mod: CPTII,S$GLB,, | Performed by: FAMILY MEDICINE

## 2018-06-13 PROCEDURE — 36415 COLL VENOUS BLD VENIPUNCTURE: CPT | Mod: PO

## 2018-06-13 PROCEDURE — 99999 PR PBB SHADOW E&M-EST. PATIENT-LVL IV: CPT | Mod: PBBFAC,,, | Performed by: FAMILY MEDICINE

## 2018-06-13 NOTE — PATIENT INSTRUCTIONS
Prevention Guidelines, Women Ages 65 and Older  Screening tests and vaccines are an important part of managing your health. Health counseling is essential, too. Below are guidelines for these, for women ages 65 and older. Talk with your healthcare provider to make sure youre up to date on what you need.  Screening Who needs it How often   Type 2 diabetes or prediabetes All adults beginning at age 45 and adults without symptoms at any age who are overweight or obese and have 1 or more additional risk factors for diabetes At least every 3 years   Alcohol misuse All women in this age group At routine exams   Blood pressure All women in this age group Every 2 years if your blood pressure is less than 120/80 mm Hg; yearly if your systolic blood pressure is 120 to 139 mm Hg, or your diastolic blood pressure reading is 80 to 89 mm Hg   Breast cancer All women in this age group Yearly mammogram and clinical breast exam1   Cervical cancer Only women who had abnormal screening results before age 65 Talk with your healthcare provider   Chlamydia Women at increased risk for infection At routine exams   Colorectal cancer All women in this age group1 Flexible sigmoidoscopy every 5 years, or colonoscopy every 10 years, or double-contrast barium enema every 5 years; yearly fecal occult blood test or fecal immunochemical test; or a stool DNA test as often as your healthcare provider advises; talk with your healthcare provider about which tests are best for you   Depression All women in this age group At routine exams   Gonorrhea Sexually active women at increased risk for infection At routine exams   Hepatitis C Anyone at increased risk; 1 time for those born between 1945 and 1965 At routine exams   High cholesterol or triglycerides All women in this age group who are at risk for coronary artery disease At least every 5 years   HIV Women at increased risk for infection - talk with your healthcare provider At routine exams   Lung  cancer Adults age 55 to 80 who have smoked Yearly screening in smokers with 30 pack-year history of smoking or who quit within 15 years   Obesity All women in this age group At routine exams   Osteoporosis All women in this age group Bone density test at age 65, then follow-up as advised by your healthcare provider   Syphilis Women at increased risk for infection - talk with your healthcare provider At routine exams   Thyroid-Stimulating Hormone (TSH) All women in this age group Every 5 years   Tuberculosis Women at increased risk for infection - talk with your healthcare provider Ask your healthcare provider   Vision All women in this age group Every 1 to 2 years; if you have a chronic health condition, ask your healthcare provider if you need exams more often   Vaccine Who needs it How often   Chickenpox (varicella) All women in this age group who have no record of this infection or vaccine 2 doses; second dose should be given at least 4 weeks after the first dose   Hepatitis A Women at increased risk for infection - talk with your healthcare provider 2 doses given 6 months apart   Hepatitis B Women at increased risk for infection - talk with your healthcare provider 3 doses over 6 months; second dose should be given 1 month after the first dose; the third dose should be given at least 2 months after the second dose and at least 4 months after the first dose   Haemophilus influenza Type B (HIB) Women at increased risk for infection - talk with your healthcare provider 1 to 3 doses   Influenza (flu) All women in this age group Once a year   Pneumococcal conjugate vaccine (PCV13) and pneumococcal polysaccharide vaccine (PPSV23) All women in this age group 1 dose of each vaccine   Tetanus/diphtheria/pertussis (Td/Tdap) booster All women in this age group Td every 10 years, or a one-time dose of Tdap instead of a Td booster after age 18, then Td every 10 years   Zoster All women in this age group 1 dose   Counseling  Who needs it How often   Diet and exercise Women who are overweight or obese When diagnosed, and then at routine exams   Fall prevention (exercise and vitamin D supplements) All women in this age group At routine exams   Sexually transmitted infection prevention Women at increased risk for infection - talk with your healthcare provider At routine exams   Use of daily aspirin Women ages 55 and up in this age group who are at risk for cardiovascular health problems such as stroke When your risk is known   Use of tobacco and the health effects it can cause All women in this age group Every exam   1[employer]  Date Last Reviewed: 8/9/2015  © 1837-6607 [employer]. [address] [place] [address], [place] [address]. All rights reserved. This information is not intended as a substitute for professional medical care. Always follow your healthcare professional's instructions.

## 2018-06-13 NOTE — PROGRESS NOTES
Desirae No 67 y.o. White female    HPI- The patient is presenting today for Annual Exam  68yo female presents today annual examination. She is reporting vision concerns and plans to pursue assessment at [facility]. She does not wear contacts or glasses. Hearing has been stable. There is no report of hearing loss or tinnitus. She has been making improvements with regard to her diet. She notes some issues with appetite suppression attributed to her medications. Her activity is limited secondary to RSD and chronic pain issues. She ambulates with a walker. No recent falls are reported. She is using an exercise bike for exertion 15 minutes daily. No intolerance is reported. Sleep patterns vary. She is on Seroquel per Pain Management- this does help somewhat. She has been averaging 5 hours nightly. Mood symptoms are stable. There is no report of anxiety or depression. No recent ER visits or hospitalizations.    Past Medical History:   Diagnosis Date    DDD (degenerative disc disease), lumbar 4/11/2014    Hypertension     Neurogenic bladder     Pulmonary embolism 4/11/2014     Past Surgical History:   Procedure Laterality Date    CERVICAL FUSION      CHOLECYSTECTOMY      HYSTERECTOMY  1974    menorrhagia    JOINT REPLACEMENT      neck fusion       Family History   Problem Relation Age of Onset    Stroke Father      Current Outpatient Prescriptions   Medication Sig Dispense Refill    aspirin (ECOTRIN) 81 MG EC tablet Take 1 tablet (81 mg total) by mouth once daily.      clonazepam (KLONOPIN) 2 MG Tab Take 2 mg by mouth 2 (two) times daily.      oxybutynin (DITROPAN) 5 MG Tab Take 5 mg by mouth 2 (two) times daily.       oxycodone (ROXICODONE) 30 MG Tab Take 30 mg by mouth every 6 (six) hours as needed.  0    polyethylene glycol (GLYCOLAX) 17 gram PwPk Take 17 g by mouth.      promethazine (PHENERGAN) 25 MG tablet Take 1 tablet (25 mg total) by mouth every 6 (six) hours as needed for Nausea. 15  "tablet 0    quetiapine (SEROQUEL) 400 MG tablet Take 400 mg by mouth every evening.      tizanidine (ZANAFLEX) 4 MG tablet       XTAMPZA ER 27 mg CSpT TAKE TWO CAPSULES BY MOUTH EVERY TWELVE HOURS WITH FOOD  0     No current facility-administered medications for this visit.      Allergies-  Review of patient's allergies indicates:   Allergen Reactions    Nucynta [tapentadol] Nausea And Vomiting    Prochlorperazine Nausea And Vomiting    Stadol [butorphanol tartrate] Nausea And Vomiting    Metoclopramide     Morphine (pf)     Reglan [metoclopramide hcl]     Toradol [ketorolac]     Zofran [ondansetron hcl (pf)]      ROS-   CONSTITUTIONAL- No fever, no chills, +fatigue, no weight loss  HEENT- No vision changes, ear pain, hearing loss  CHEST- No cough, shortness of breath  CV- No chest pain, palpitations, edema  Abdomen- No abdominal pain, change in bowel habits, blood in stool  - No change in urination- suprapubic catheter in place, no hematuria  Neurologic- No headaches, dizziness, weakness  Musculoskeletal- + joint pain- bilateral feet, no back pain, no myalgias  Endocrine- No polydypsia, polyphagia or polyuria, no heat or cold intolerance  Hematologic- No bruising or bleeding, no lymphadenopathy  Psych- No change in mood, + trouble with sleep  Integument- No rashes, no skin changes    /80   Pulse 89   Temp 97.4 °F (36.3 °C) (Temporal)   Resp 16   Ht 5' 7" (1.702 m)   Wt 97.3 kg (214 lb 9.9 oz)   LMP 04/24/1974   SpO2 96%   BMI 33.61 kg/m²     PE-  CONSTITIONAL- Well developed, well nourished, no acute distress, obese, non-toxic  HEENT- Normal cephalic, atraumatic, PERRLA, right ear external- no abnormality, left ear external- no abnormality, right TM- normal to visualization, left TM- normal to visualization, moist mucus membranes, no oropharyngeal abnormality visualized nasal turbinates are clear  Neck- Full range of motion, no thyromegaly, no cervical lymphadenopathy  CV- Normal rate and " rhythm, heart sounds normal, no murmurs, rubs or gallops  Chest- Breath sounds are normal and equal bilaterally, normal inspiratory and expiratory efforts  Abdomen- Bowel sounds are equal in all four quadrants, non tender to palpation, soft, no distention  Musculoskeletal- Normal ROM of the extremities, no tenderness  Neurologic- Alert, orientated x 3, cranial nerves intact  Skin- Warm and dry to touch, no rashes, no visible lesions  Psych- Mood and affect normal, Behavior normal    Assessment and Plan-  Annual physical exam  General health screening recommendations were reviewed with the patient in office today. Emphasized healthy eating and regular physical activity. Anticipatory guidance has been provided with regard to age and informational handouts have been given. Next well check is recommended in 1 year, rtc sooner for routine chronic care assessment.     Gastroesophageal reflux disease, esophagitis presence not specified  Refrain from known dietary triggers. Zantac for prn use.    Neurogenic bladder  Continue as per Urology.    Chronic pain syndrome  Continue as per Pain Management.    RSD (reflex sympathetic dystrophy)  Continue as per Pain Management.    Vitamin D insufficiency  Vitamin D supplementation discussed.    Pruritus  Consider Xyzal once daily. May need to consider further eval per Dermatology.    Atherosclerosis of aorta  BP and lipid control are advised.    Obesity (BMI 30-39.9)  Weight loss efforts remain encouraged.    Suprapubic catheter  As per Urology.    Visit for screening mammogram  -     Mammo Digital Screening Bilat with CAD; Future; Expected date: 06/13/2018    Post-menopause  -     DXA Bone Density Spine And Hip; Future; Expected date: 06/13/2018    Need for vaccination with 13-polyvalent pneumococcal conjugate vaccine  -     (In Office Administered) Pneumococcal Conjugate Vaccine (13 Valent) (IM)

## 2018-06-19 DIAGNOSIS — I70.0 ATHEROSCLEROSIS OF AORTA: Primary | ICD-10-CM

## 2018-06-19 RX ORDER — ATORVASTATIN CALCIUM 10 MG/1
10 TABLET, FILM COATED ORAL NIGHTLY
Qty: 90 TABLET | Refills: 1 | Status: SHIPPED | OUTPATIENT
Start: 2018-06-19 | End: 2019-02-04 | Stop reason: SDUPTHER

## 2018-06-22 ENCOUNTER — TELEPHONE (OUTPATIENT)
Dept: ADMINISTRATIVE | Facility: CLINIC | Age: 68
End: 2018-06-22

## 2018-07-06 ENCOUNTER — PES CALL (OUTPATIENT)
Dept: ADMINISTRATIVE | Facility: CLINIC | Age: 68
End: 2018-07-06

## 2018-08-29 ENCOUNTER — PATIENT MESSAGE (OUTPATIENT)
Dept: FAMILY MEDICINE | Facility: CLINIC | Age: 68
End: 2018-08-29

## 2018-09-12 ENCOUNTER — PATIENT OUTREACH (OUTPATIENT)
Dept: ADMINISTRATIVE | Facility: HOSPITAL | Age: 68
End: 2018-09-12

## 2018-09-12 ENCOUNTER — OFFICE VISIT (OUTPATIENT)
Dept: FAMILY MEDICINE | Facility: CLINIC | Age: 68
End: 2018-09-12
Payer: MEDICARE

## 2018-09-12 VITALS
HEART RATE: 97 BPM | SYSTOLIC BLOOD PRESSURE: 128 MMHG | HEIGHT: 68 IN | OXYGEN SATURATION: 97 % | TEMPERATURE: 98 F | BODY MASS INDEX: 34.89 KG/M2 | WEIGHT: 230.19 LBS | DIASTOLIC BLOOD PRESSURE: 80 MMHG

## 2018-09-12 DIAGNOSIS — Z93.59 SUPRAPUBIC CATHETER: ICD-10-CM

## 2018-09-12 DIAGNOSIS — G89.4 CHRONIC PAIN SYNDROME: ICD-10-CM

## 2018-09-12 DIAGNOSIS — N31.9 NEUROGENIC BLADDER: ICD-10-CM

## 2018-09-12 DIAGNOSIS — L30.9 DERMATITIS: Primary | ICD-10-CM

## 2018-09-12 DIAGNOSIS — E66.9 OBESITY (BMI 30-39.9): ICD-10-CM

## 2018-09-12 DIAGNOSIS — G90.50 RSD (REFLEX SYMPATHETIC DYSTROPHY): ICD-10-CM

## 2018-09-12 DIAGNOSIS — I70.0 ATHEROSCLEROSIS OF AORTA: ICD-10-CM

## 2018-09-12 DIAGNOSIS — Z12.31 VISIT FOR SCREENING MAMMOGRAM: ICD-10-CM

## 2018-09-12 DIAGNOSIS — Z78.0 POST-MENOPAUSE: ICD-10-CM

## 2018-09-12 PROBLEM — N39.0 URINARY TRACT INFECTION ASSOCIATED WITH CATHETERIZATION OF URINARY TRACT: Status: RESOLVED | Noted: 2017-05-23 | Resolved: 2018-09-12

## 2018-09-12 PROBLEM — T83.511A URINARY TRACT INFECTION ASSOCIATED WITH CATHETERIZATION OF URINARY TRACT: Status: RESOLVED | Noted: 2017-05-23 | Resolved: 2018-09-12

## 2018-09-12 PROCEDURE — 1101F PT FALLS ASSESS-DOCD LE1/YR: CPT | Mod: CPTII,,, | Performed by: FAMILY MEDICINE

## 2018-09-12 PROCEDURE — 99999 PR PBB SHADOW E&M-EST. PATIENT-LVL IV: CPT | Mod: PBBFAC,,, | Performed by: FAMILY MEDICINE

## 2018-09-12 PROCEDURE — 3074F SYST BP LT 130 MM HG: CPT | Mod: CPTII,,, | Performed by: FAMILY MEDICINE

## 2018-09-12 PROCEDURE — 99214 OFFICE O/P EST MOD 30 MIN: CPT | Mod: S$PBB,,, | Performed by: FAMILY MEDICINE

## 2018-09-12 PROCEDURE — 99214 OFFICE O/P EST MOD 30 MIN: CPT | Mod: PBBFAC,PO | Performed by: FAMILY MEDICINE

## 2018-09-12 PROCEDURE — 3079F DIAST BP 80-89 MM HG: CPT | Mod: CPTII,,, | Performed by: FAMILY MEDICINE

## 2018-09-12 NOTE — PROGRESS NOTES
"Subjective:       Patient ID: Desirae No is a 68 y.o. female.    Chief Complaint: Chronic Care    HPI   Chronic Care  68yo female presents today for chronic care assessment. She is reporting persistent issues with pruritis of the skin for which she applies Benadryl topically to the body. She has seen some commercials for a new product on television and is interested in an RX. She denies any recent change in hygiene products. She does moisturize regularly. Areas affected are "all over". Notes that symptoms awaken her from sleep at night on occasion. She has never been evaluated by Dermatology for this concern. Since last assessment she has been started on Lipitor for atherosclerosis of the aorta and hyperlipidemia. No adverse effects of statin use are reported. Health maintenance update is needed. There have been no ER visits or hospitalizations since last check.    Review of Systems   Constitutional: Negative for appetite change and fatigue.   HENT: Negative for congestion, ear pain and sinus pressure.    Eyes: Negative for visual disturbance.   Respiratory: Negative for cough and shortness of breath.    Cardiovascular: Negative for chest pain, palpitations and leg swelling.   Gastrointestinal: Negative for abdominal pain, constipation and diarrhea.   Genitourinary: Negative for decreased urine volume and hematuria.   Musculoskeletal: Positive for arthralgias and back pain. Negative for myalgias.   Skin: Positive for rash. Negative for color change.   Allergic/Immunologic: Negative for environmental allergies and food allergies.   Neurological: Negative for dizziness, weakness and headaches.   Psychiatric/Behavioral: Negative for dysphoric mood and sleep disturbance.       Objective:   /80   Pulse 97   Temp 98.1 °F (36.7 °C)   Ht 5' 8" (1.727 m)   Wt 104.4 kg (230 lb 2.6 oz)   LMP 04/24/1974   SpO2 97%   BMI 35.00 kg/m²   Physical Exam   Constitutional: She is oriented to person, place, and time. " She appears well-developed and well-nourished.   Obese, non-toxic   HENT:   Head: Normocephalic and atraumatic.   Right Ear: Tympanic membrane, external ear and ear canal normal.   Left Ear: Tympanic membrane, external ear and ear canal normal.   Nose: Nose normal.   Mouth/Throat: Oropharynx is clear and moist.   Eyes: Conjunctivae and EOM are normal. Pupils are equal, round, and reactive to light.   Neck: Normal range of motion. Neck supple.   Cardiovascular: Normal rate, regular rhythm and normal heart sounds.   Pulmonary/Chest: Effort normal and breath sounds normal.   Abdominal: Soft. Bowel sounds are normal.   Musculoskeletal: She exhibits no edema.   Neurological: She is alert and oriented to person, place, and time.   Skin: Skin is warm and dry.   Dermatitis noted to the upper and lower extremities and trunk   Psychiatric: She has a normal mood and affect. Her behavior is normal.       Assessment:       1. Dermatitis    2. Atherosclerosis of aorta    3. RSD (reflex sympathetic dystrophy)    4. Chronic pain syndrome    5. Neurogenic bladder    6. Obesity (BMI 30-39.9)    7. Suprapubic catheter    8. Visit for screening mammogram    9. Post-menopause        Plan:      Dermatitis  Recommend Xyzal and discontinuation of topical Benadryl. Consider hydrocortisone while awaiting evaluation by Dermatology. Recommend use of hygiene products free of dyes and perfumes as well as application of Aquaphor. Refrain from picking/scratching as well.   -     Ambulatory Referral to Dermatology    Atherosclerosis of aorta  Continue with Lipitor at current dosing. No adverse effects of use are reported. Will plan on updated lipid assessment as planned in October with a visit to follow.    RSD (reflex sympathetic dystrophy)  Continue as per Pain Management- patient notes heightened pain at this time.    Chronic pain syndrome  As above.    Neurogenic bladder  Continue as per Urology. No recent UTI reported.     Obesity (BMI  30-39.9)  Weight loss efforts remain encouraged through diet and lifestyle measures.    Suprapubic catheter  As above.    Visit for screening mammogram  -     Mammo Digital Screening Bilat with CAD; Future; Expected date: 09/12/2018    Post-menopause  -     DXA Bone Density Spine And Hip; Future; Expected date: 09/12/2018

## 2018-09-17 ENCOUNTER — TELEPHONE (OUTPATIENT)
Dept: FAMILY MEDICINE | Facility: CLINIC | Age: 68
End: 2018-09-17

## 2018-10-15 ENCOUNTER — OFFICE VISIT (OUTPATIENT)
Dept: FAMILY MEDICINE | Facility: CLINIC | Age: 68
DRG: 572 | End: 2018-10-15
Payer: MEDICARE

## 2018-10-15 VITALS
DIASTOLIC BLOOD PRESSURE: 80 MMHG | BODY MASS INDEX: 34.28 KG/M2 | SYSTOLIC BLOOD PRESSURE: 120 MMHG | HEART RATE: 100 BPM | TEMPERATURE: 99 F | WEIGHT: 226.19 LBS | OXYGEN SATURATION: 98 % | HEIGHT: 68 IN | RESPIRATION RATE: 16 BRPM

## 2018-10-15 DIAGNOSIS — Z93.59 SUPRAPUBIC CATHETER: ICD-10-CM

## 2018-10-15 DIAGNOSIS — L03.119 CELLULITIS OF AXILLARY REGION: ICD-10-CM

## 2018-10-15 DIAGNOSIS — I70.0 ATHEROSCLEROSIS OF AORTA: ICD-10-CM

## 2018-10-15 DIAGNOSIS — Z86.14 HISTORY OF MRSA INFECTION: ICD-10-CM

## 2018-10-15 DIAGNOSIS — E66.9 OBESITY (BMI 30-39.9): ICD-10-CM

## 2018-10-15 DIAGNOSIS — L02.419 AXILLARY ABSCESS: Primary | ICD-10-CM

## 2018-10-15 PROCEDURE — 1100F PTFALLS ASSESS-DOCD GE2>/YR: CPT | Mod: CPTII,,, | Performed by: FAMILY MEDICINE

## 2018-10-15 PROCEDURE — 99214 OFFICE O/P EST MOD 30 MIN: CPT | Mod: S$PBB,,, | Performed by: FAMILY MEDICINE

## 2018-10-15 PROCEDURE — 3074F SYST BP LT 130 MM HG: CPT | Mod: CPTII,,, | Performed by: FAMILY MEDICINE

## 2018-10-15 PROCEDURE — 99214 OFFICE O/P EST MOD 30 MIN: CPT | Mod: PBBFAC,PO | Performed by: FAMILY MEDICINE

## 2018-10-15 PROCEDURE — 3079F DIAST BP 80-89 MM HG: CPT | Mod: CPTII,,, | Performed by: FAMILY MEDICINE

## 2018-10-15 PROCEDURE — 99999 PR PBB SHADOW E&M-EST. PATIENT-LVL IV: CPT | Mod: PBBFAC,,, | Performed by: FAMILY MEDICINE

## 2018-10-15 PROCEDURE — 3288F FALL RISK ASSESSMENT DOCD: CPT | Mod: CPTII,,, | Performed by: FAMILY MEDICINE

## 2018-10-15 RX ORDER — MUPIROCIN 20 MG/G
OINTMENT TOPICAL 3 TIMES DAILY
Qty: 30 G | Refills: 0 | Status: SHIPPED | OUTPATIENT
Start: 2018-10-15 | End: 2019-11-06 | Stop reason: SDUPTHER

## 2018-10-15 RX ORDER — SULFAMETHOXAZOLE AND TRIMETHOPRIM 800; 160 MG/1; MG/1
1 TABLET ORAL 2 TIMES DAILY
Qty: 14 TABLET | Refills: 0 | Status: ON HOLD | OUTPATIENT
Start: 2018-10-15 | End: 2018-10-19 | Stop reason: SDUPTHER

## 2018-10-15 RX ORDER — TRIAMCINOLONE ACETONIDE 1 MG/G
CREAM TOPICAL
Refills: 3 | COMMUNITY
Start: 2018-10-03 | End: 2018-10-16

## 2018-10-15 NOTE — PROGRESS NOTES
"Subjective:       Patient ID: Desirae No is a 68 y.o. female.    Chief Complaint: Knots under arm    HPI   Knots under arm  69yo female presents today with report of three palpable knots to the right axillary region present for 10 days. She notes having MRSA infections intermittently in the past and current symptoms are similar. There has been no weeping or drainage. Pain is significant. She is not able to wear undergarments due to pressure placed on the area. She has been afebrile but notes a Tmax of 100.1 since onset. No recent antibiotic use is reported. Pain is currently rated at 8/10.     Review of Systems   Constitutional: Negative for appetite change, fatigue and fever.   Respiratory: Negative for cough and shortness of breath.    Cardiovascular: Negative for chest pain and palpitations.   Gastrointestinal: Negative for abdominal pain, diarrhea and nausea.   Genitourinary: Negative for decreased urine volume and difficulty urinating.   Musculoskeletal: Positive for arthralgias. Negative for myalgias.   Skin: Positive for color change. Negative for rash.   Neurological: Negative for dizziness and weakness.   Psychiatric/Behavioral: Negative for sleep disturbance. The patient is nervous/anxious.        Objective:   /80   Pulse 100   Temp 98.9 °F (37.2 °C) (Temporal)   Resp 16   Ht 5' 8" (1.727 m)   Wt 102.6 kg (226 lb 3.1 oz)   LMP 04/24/1974   SpO2 98%   BMI 34.39 kg/m²   Physical Exam   Constitutional: She is oriented to person, place, and time. She appears well-developed and well-nourished. No distress.   Obese, uncomfortable appearing, non-toxic   HENT:   Head: Normocephalic and atraumatic.   Right Ear: External ear normal.   Left Ear: External ear normal.   Nose: Nose normal.   Mouth/Throat: Oropharynx is clear and moist.   Eyes: Conjunctivae and EOM are normal. Pupils are equal, round, and reactive to light.   Neck: Normal range of motion. Neck supple.   Cardiovascular: Normal rate, " regular rhythm and normal heart sounds.   Pulmonary/Chest: Effort normal and breath sounds normal.   Abdominal: Soft. Bowel sounds are normal.   Musculoskeletal: She exhibits no edema.   Neurological: She is alert and oriented to person, place, and time.   Skin: Skin is warm and dry. She is not diaphoretic. There is erythema.   4.5cm x 3cm round area of erythema and induration with centralized abscess; 1.5cm round erythematous area with underlying induration and abscess- no manually expressible drainage   Psychiatric: She has a normal mood and affect.       Assessment:       1. Axillary abscess    2. Cellulitis of axillary region    3. Atherosclerosis of aorta    4. Obesity (BMI 30-39.9)    5. Suprapubic catheter    6. History of MRSA infection        Plan:      Axillary abscess  Recommend warm compress application. Discussed starting Bactrim immediately. Will provide bactroban as well. She will see Gen Surgery tomorrow for further evaluation. Discussed w/s and reasons to seek assessment sooner if needed.  -     sulfamethoxazole-trimethoprim 800-160mg (BACTRIM DS) 800-160 mg Tab; Take 1 tablet by mouth 2 (two) times daily.  Dispense: 14 tablet; Refill: 0  -     mupirocin (BACTROBAN) 2 % ointment; Apply topically 3 (three) times daily.  Dispense: 30 g; Refill: 0  -     Ambulatory Referral to General Surgery    Cellulitis of axillary region  As above.    Atherosclerosis of aorta  BP and lipid control remain advised.    Obesity (BMI 30-39.9)  Weight loss efforts remain encouraged.    Suprapubic catheter  Continue as per Urology.    History of MRSA infection  As above.

## 2018-10-16 ENCOUNTER — HOSPITAL ENCOUNTER (OUTPATIENT)
Dept: RADIOLOGY | Facility: HOSPITAL | Age: 68
Discharge: HOME | DRG: 572 | End: 2018-10-16
Attending: SURGERY | Admitting: COLON & RECTAL SURGERY
Payer: MEDICARE

## 2018-10-16 ENCOUNTER — ANESTHESIA (OUTPATIENT)
Dept: SURGERY | Facility: HOSPITAL | Age: 68
DRG: 572 | End: 2018-10-16
Payer: MEDICARE

## 2018-10-16 ENCOUNTER — HOSPITAL ENCOUNTER (INPATIENT)
Facility: HOSPITAL | Age: 68
LOS: 2 days | Discharge: HOME HEALTH | DRG: 572 | End: 2018-10-19
Attending: COLON & RECTAL SURGERY | Admitting: SURGERY
Payer: MEDICARE

## 2018-10-16 ENCOUNTER — ANESTHESIA EVENT (OUTPATIENT)
Dept: SURGERY | Facility: HOSPITAL | Age: 68
DRG: 572 | End: 2018-10-16
Payer: MEDICARE

## 2018-10-16 ENCOUNTER — OFFICE VISIT (OUTPATIENT)
Dept: SURGERY | Facility: CLINIC | Age: 68
DRG: 572 | End: 2018-10-16
Payer: MEDICARE

## 2018-10-16 VITALS
HEART RATE: 88 BPM | SYSTOLIC BLOOD PRESSURE: 118 MMHG | WEIGHT: 226.19 LBS | DIASTOLIC BLOOD PRESSURE: 78 MMHG | HEIGHT: 68 IN | BODY MASS INDEX: 34.28 KG/M2

## 2018-10-16 DIAGNOSIS — L02.419 AXILLARY ABSCESS: Primary | ICD-10-CM

## 2018-10-16 DIAGNOSIS — L02.419 AXILLARY ABSCESS: ICD-10-CM

## 2018-10-16 PROCEDURE — 63600175 PHARM REV CODE 636 W HCPCS: Performed by: SURGERY

## 2018-10-16 PROCEDURE — S0020 INJECTION, BUPIVICAINE HYDRO: HCPCS | Performed by: SURGERY

## 2018-10-16 PROCEDURE — 1100F PTFALLS ASSESS-DOCD GE2>/YR: CPT | Mod: CPTII,,, | Performed by: SURGERY

## 2018-10-16 PROCEDURE — 71000039 HC RECOVERY, EACH ADD'L HOUR: Performed by: SURGERY

## 2018-10-16 PROCEDURE — 93010 ELECTROCARDIOGRAM REPORT: CPT | Mod: ,,, | Performed by: INTERNAL MEDICINE

## 2018-10-16 PROCEDURE — 36000704 HC OR TIME LEV I 1ST 15 MIN: Performed by: SURGERY

## 2018-10-16 PROCEDURE — 87186 SC STD MICRODIL/AGAR DIL: CPT

## 2018-10-16 PROCEDURE — 71000033 HC RECOVERY, INTIAL HOUR: Performed by: SURGERY

## 2018-10-16 PROCEDURE — 87070 CULTURE OTHR SPECIMN AEROBIC: CPT

## 2018-10-16 PROCEDURE — 37000008 HC ANESTHESIA 1ST 15 MINUTES: Performed by: SURGERY

## 2018-10-16 PROCEDURE — 25000003 PHARM REV CODE 250: Performed by: SURGERY

## 2018-10-16 PROCEDURE — 99999 PR PBB SHADOW E&M-EST. PATIENT-LVL III: CPT | Mod: PBBFAC,,, | Performed by: SURGERY

## 2018-10-16 PROCEDURE — 36000705 HC OR TIME LEV I EA ADD 15 MIN: Performed by: SURGERY

## 2018-10-16 PROCEDURE — 10061 I&D ABSCESS COMP/MULTIPLE: CPT | Mod: ,,, | Performed by: SURGERY

## 2018-10-16 PROCEDURE — 93005 ELECTROCARDIOGRAM TRACING: CPT

## 2018-10-16 PROCEDURE — 3078F DIAST BP <80 MM HG: CPT | Mod: CPTII,,, | Performed by: SURGERY

## 2018-10-16 PROCEDURE — 63600175 PHARM REV CODE 636 W HCPCS: Performed by: NURSE ANESTHETIST, CERTIFIED REGISTERED

## 2018-10-16 PROCEDURE — 3288F FALL RISK ASSESSMENT DOCD: CPT | Mod: CPTII,,, | Performed by: SURGERY

## 2018-10-16 PROCEDURE — 63600175 PHARM REV CODE 636 W HCPCS: Performed by: ANESTHESIOLOGY

## 2018-10-16 PROCEDURE — 0JBF0ZZ EXCISION OF LEFT UPPER ARM SUBCUTANEOUS TISSUE AND FASCIA, OPEN APPROACH: ICD-10-PCS | Performed by: SURGERY

## 2018-10-16 PROCEDURE — 25000003 PHARM REV CODE 250: Performed by: NURSE ANESTHETIST, CERTIFIED REGISTERED

## 2018-10-16 PROCEDURE — 99204 OFFICE O/P NEW MOD 45 MIN: CPT | Mod: 25,S$PBB,, | Performed by: SURGERY

## 2018-10-16 PROCEDURE — 25000003 PHARM REV CODE 250: Performed by: ANESTHESIOLOGY

## 2018-10-16 PROCEDURE — 71046 X-RAY EXAM CHEST 2 VIEWS: CPT | Mod: TC

## 2018-10-16 PROCEDURE — 99213 OFFICE O/P EST LOW 20 MIN: CPT | Mod: PBBFAC,PO | Performed by: SURGERY

## 2018-10-16 PROCEDURE — 37000009 HC ANESTHESIA EA ADD 15 MINS: Performed by: SURGERY

## 2018-10-16 PROCEDURE — 3074F SYST BP LT 130 MM HG: CPT | Mod: CPTII,,, | Performed by: SURGERY

## 2018-10-16 PROCEDURE — 87077 CULTURE AEROBIC IDENTIFY: CPT

## 2018-10-16 RX ORDER — METRONIDAZOLE 500 MG/100ML
500 INJECTION, SOLUTION INTRAVENOUS
Status: CANCELLED | OUTPATIENT
Start: 2018-10-16

## 2018-10-16 RX ORDER — TIZANIDINE 4 MG/1
4 TABLET ORAL EVERY 8 HOURS
Status: DISCONTINUED | OUTPATIENT
Start: 2018-10-16 | End: 2018-10-19 | Stop reason: HOSPADM

## 2018-10-16 RX ORDER — OXYCODONE HYDROCHLORIDE 5 MG/1
5 TABLET ORAL
Status: COMPLETED | OUTPATIENT
Start: 2018-10-16 | End: 2018-10-16

## 2018-10-16 RX ORDER — CHLORHEXIDINE GLUCONATE ORAL RINSE 1.2 MG/ML
10 SOLUTION DENTAL 2 TIMES DAILY
Status: DISCONTINUED | OUTPATIENT
Start: 2018-10-16 | End: 2018-10-19 | Stop reason: HOSPADM

## 2018-10-16 RX ORDER — SODIUM CHLORIDE 9 MG/ML
INJECTION, SOLUTION INTRAVENOUS CONTINUOUS
Status: DISCONTINUED | OUTPATIENT
Start: 2018-10-16 | End: 2018-10-16

## 2018-10-16 RX ORDER — PROMETHAZINE HYDROCHLORIDE 25 MG/1
25 TABLET ORAL EVERY 6 HOURS PRN
Status: DISCONTINUED | OUTPATIENT
Start: 2018-10-16 | End: 2018-10-19 | Stop reason: HOSPADM

## 2018-10-16 RX ORDER — SODIUM CHLORIDE 9 MG/ML
INJECTION, SOLUTION INTRAVENOUS CONTINUOUS
Status: CANCELLED | OUTPATIENT
Start: 2018-10-16

## 2018-10-16 RX ORDER — LIDOCAINE HYDROCHLORIDE 10 MG/ML
INJECTION, SOLUTION EPIDURAL; INFILTRATION; INTRACAUDAL; PERINEURAL
Status: COMPLETED | OUTPATIENT
Start: 2018-10-16 | End: 2018-10-16

## 2018-10-16 RX ORDER — BUPIVACAINE HYDROCHLORIDE 5 MG/ML
INJECTION, SOLUTION EPIDURAL; INTRACAUDAL
Status: DISCONTINUED | OUTPATIENT
Start: 2018-10-16 | End: 2018-10-16 | Stop reason: HOSPADM

## 2018-10-16 RX ORDER — VANCOMYCIN HCL IN 5 % DEXTROSE 1G/250ML
1000 PLASTIC BAG, INJECTION (ML) INTRAVENOUS
Status: DISCONTINUED | OUTPATIENT
Start: 2018-10-16 | End: 2018-10-17

## 2018-10-16 RX ORDER — LIDOCAINE HYDROCHLORIDE 10 MG/ML
INJECTION, SOLUTION EPIDURAL; INFILTRATION; INTRACAUDAL; PERINEURAL
Status: DISCONTINUED | OUTPATIENT
Start: 2018-10-16 | End: 2018-10-16 | Stop reason: HOSPADM

## 2018-10-16 RX ORDER — PROPOFOL 10 MG/ML
VIAL (ML) INTRAVENOUS
Status: DISCONTINUED | OUTPATIENT
Start: 2018-10-16 | End: 2018-10-16

## 2018-10-16 RX ORDER — HYDROMORPHONE HYDROCHLORIDE 1 MG/ML
1 INJECTION, SOLUTION INTRAMUSCULAR; INTRAVENOUS; SUBCUTANEOUS EVERY 4 HOURS PRN
Status: DISCONTINUED | OUTPATIENT
Start: 2018-10-16 | End: 2018-10-19 | Stop reason: HOSPADM

## 2018-10-16 RX ORDER — HYDROMORPHONE HYDROCHLORIDE 2 MG/ML
0.5 INJECTION, SOLUTION INTRAMUSCULAR; INTRAVENOUS; SUBCUTANEOUS EVERY 5 MIN PRN
Status: COMPLETED | OUTPATIENT
Start: 2018-10-16 | End: 2018-10-16

## 2018-10-16 RX ORDER — LIDOCAINE HYDROCHLORIDE 10 MG/ML
1 INJECTION, SOLUTION EPIDURAL; INFILTRATION; INTRACAUDAL; PERINEURAL ONCE
Status: DISCONTINUED | OUTPATIENT
Start: 2018-10-16 | End: 2021-06-29 | Stop reason: HOSPADM

## 2018-10-16 RX ORDER — QUETIAPINE FUMARATE 100 MG/1
400 TABLET, FILM COATED ORAL NIGHTLY
Status: DISCONTINUED | OUTPATIENT
Start: 2018-10-16 | End: 2018-10-19 | Stop reason: HOSPADM

## 2018-10-16 RX ORDER — POLYETHYLENE GLYCOL 3350 17 G/17G
17 POWDER, FOR SOLUTION ORAL DAILY
Status: DISCONTINUED | OUTPATIENT
Start: 2018-10-17 | End: 2018-10-19 | Stop reason: HOSPADM

## 2018-10-16 RX ORDER — ATORVASTATIN CALCIUM 10 MG/1
10 TABLET, FILM COATED ORAL NIGHTLY
Status: DISCONTINUED | OUTPATIENT
Start: 2018-10-16 | End: 2018-10-19 | Stop reason: HOSPADM

## 2018-10-16 RX ORDER — ASPIRIN 81 MG/1
81 TABLET ORAL DAILY
Status: DISCONTINUED | OUTPATIENT
Start: 2018-10-17 | End: 2018-10-19 | Stop reason: HOSPADM

## 2018-10-16 RX ORDER — SODIUM CHLORIDE, SODIUM LACTATE, POTASSIUM CHLORIDE, CALCIUM CHLORIDE 600; 310; 30; 20 MG/100ML; MG/100ML; MG/100ML; MG/100ML
INJECTION, SOLUTION INTRAVENOUS CONTINUOUS
Status: DISCONTINUED | OUTPATIENT
Start: 2018-10-16 | End: 2018-10-17

## 2018-10-16 RX ORDER — FENTANYL CITRATE 50 UG/ML
INJECTION, SOLUTION INTRAMUSCULAR; INTRAVENOUS
Status: DISCONTINUED | OUTPATIENT
Start: 2018-10-16 | End: 2018-10-16

## 2018-10-16 RX ORDER — MIDAZOLAM HYDROCHLORIDE 1 MG/ML
INJECTION, SOLUTION INTRAMUSCULAR; INTRAVENOUS
Status: DISCONTINUED | OUTPATIENT
Start: 2018-10-16 | End: 2018-10-16

## 2018-10-16 RX ORDER — CEFAZOLIN SODIUM 1 G/3ML
INJECTION, POWDER, FOR SOLUTION INTRAMUSCULAR; INTRAVENOUS
Status: DISCONTINUED | OUTPATIENT
Start: 2018-10-16 | End: 2018-10-16

## 2018-10-16 RX ORDER — ONDANSETRON 8 MG/1
8 TABLET, ORALLY DISINTEGRATING ORAL EVERY 8 HOURS PRN
Status: DISCONTINUED | OUTPATIENT
Start: 2018-10-16 | End: 2018-10-19 | Stop reason: HOSPADM

## 2018-10-16 RX ORDER — METRONIDAZOLE 500 MG/100ML
500 INJECTION, SOLUTION INTRAVENOUS
Status: DISCONTINUED | OUTPATIENT
Start: 2018-10-16 | End: 2018-10-16 | Stop reason: HOSPADM

## 2018-10-16 RX ORDER — ACETAMINOPHEN 325 MG/1
650 TABLET ORAL EVERY 6 HOURS PRN
Status: DISCONTINUED | OUTPATIENT
Start: 2018-10-16 | End: 2018-10-19 | Stop reason: HOSPADM

## 2018-10-16 RX ORDER — MEPERIDINE HYDROCHLORIDE 50 MG/ML
12.5 INJECTION INTRAMUSCULAR; INTRAVENOUS; SUBCUTANEOUS ONCE
Status: COMPLETED | OUTPATIENT
Start: 2018-10-16 | End: 2018-10-16

## 2018-10-16 RX ORDER — SODIUM CHLORIDE, SODIUM LACTATE, POTASSIUM CHLORIDE, CALCIUM CHLORIDE 600; 310; 30; 20 MG/100ML; MG/100ML; MG/100ML; MG/100ML
INJECTION, SOLUTION INTRAVENOUS CONTINUOUS PRN
Status: DISCONTINUED | OUTPATIENT
Start: 2018-10-16 | End: 2018-10-16

## 2018-10-16 RX ORDER — HYDROMORPHONE HYDROCHLORIDE 2 MG/ML
0.5 INJECTION, SOLUTION INTRAMUSCULAR; INTRAVENOUS; SUBCUTANEOUS EVERY 5 MIN PRN
Status: DISCONTINUED | OUTPATIENT
Start: 2018-10-16 | End: 2018-10-16 | Stop reason: HOSPADM

## 2018-10-16 RX ORDER — OXYCODONE HYDROCHLORIDE 5 MG/1
15 TABLET ORAL EVERY 6 HOURS PRN
Status: DISCONTINUED | OUTPATIENT
Start: 2018-10-16 | End: 2018-10-19 | Stop reason: HOSPADM

## 2018-10-16 RX ORDER — FENTANYL CITRATE 50 UG/ML
25 INJECTION, SOLUTION INTRAMUSCULAR; INTRAVENOUS EVERY 5 MIN PRN
Status: COMPLETED | OUTPATIENT
Start: 2018-10-16 | End: 2018-10-16

## 2018-10-16 RX ORDER — OXYBUTYNIN CHLORIDE 5 MG/1
5 TABLET ORAL 2 TIMES DAILY
Status: DISCONTINUED | OUTPATIENT
Start: 2018-10-16 | End: 2018-10-19 | Stop reason: HOSPADM

## 2018-10-16 RX ORDER — CLONAZEPAM 1 MG/1
1 TABLET ORAL 2 TIMES DAILY PRN
Status: DISCONTINUED | OUTPATIENT
Start: 2018-10-16 | End: 2018-10-19 | Stop reason: HOSPADM

## 2018-10-16 RX ADMIN — MEPERIDINE HYDROCHLORIDE 12.5 MG: 50 INJECTION INTRAMUSCULAR; INTRAVENOUS; SUBCUTANEOUS at 05:10

## 2018-10-16 RX ADMIN — OXYCODONE HYDROCHLORIDE 15 MG: 5 TABLET ORAL at 09:10

## 2018-10-16 RX ADMIN — OXYCODONE HYDROCHLORIDE 5 MG: 5 TABLET ORAL at 03:10

## 2018-10-16 RX ADMIN — TIZANIDINE 4 MG: 4 TABLET ORAL at 09:10

## 2018-10-16 RX ADMIN — OXYBUTYNIN CHLORIDE 5 MG: 5 TABLET ORAL at 09:10

## 2018-10-16 RX ADMIN — PROPOFOL 25 MG: 10 INJECTION, EMULSION INTRAVENOUS at 04:10

## 2018-10-16 RX ADMIN — HYDROMORPHONE HYDROCHLORIDE 1 MG: 1 INJECTION, SOLUTION INTRAMUSCULAR; INTRAVENOUS; SUBCUTANEOUS at 10:10

## 2018-10-16 RX ADMIN — SODIUM CHLORIDE, SODIUM LACTATE, POTASSIUM CHLORIDE, AND CALCIUM CHLORIDE: .6; .31; .03; .02 INJECTION, SOLUTION INTRAVENOUS at 04:10

## 2018-10-16 RX ADMIN — VANCOMYCIN HYDROCHLORIDE 1000 MG: 1 INJECTION, POWDER, LYOPHILIZED, FOR SOLUTION INTRAVENOUS at 09:10

## 2018-10-16 RX ADMIN — ATORVASTATIN CALCIUM 10 MG: 10 TABLET, FILM COATED ORAL at 09:10

## 2018-10-16 RX ADMIN — FENTANYL CITRATE 25 MCG: 50 INJECTION INTRAMUSCULAR; INTRAVENOUS at 05:10

## 2018-10-16 RX ADMIN — SODIUM CHLORIDE, SODIUM LACTATE, POTASSIUM CHLORIDE, AND CALCIUM CHLORIDE: .6; .31; .03; .02 INJECTION, SOLUTION INTRAVENOUS at 09:10

## 2018-10-16 RX ADMIN — HYDROMORPHONE HYDROCHLORIDE 0.5 MG: 2 INJECTION INTRAMUSCULAR; INTRAVENOUS; SUBCUTANEOUS at 06:10

## 2018-10-16 RX ADMIN — CEFAZOLIN 2 G: 1 INJECTION, POWDER, FOR SOLUTION INTRAMUSCULAR; INTRAVENOUS at 04:10

## 2018-10-16 RX ADMIN — FENTANYL CITRATE 100 MCG: 50 INJECTION, SOLUTION INTRAMUSCULAR; INTRAVENOUS at 03:10

## 2018-10-16 RX ADMIN — MIDAZOLAM 2 MG: 1 INJECTION INTRAMUSCULAR; INTRAVENOUS at 04:10

## 2018-10-16 RX ADMIN — MIDAZOLAM 2 MG: 1 INJECTION INTRAMUSCULAR; INTRAVENOUS at 03:10

## 2018-10-16 RX ADMIN — QUETIAPINE FUMARATE 400 MG: 100 TABLET ORAL at 09:10

## 2018-10-16 RX ADMIN — LIDOCAINE HYDROCHLORIDE 20 MG: 10 INJECTION, SOLUTION EPIDURAL; INFILTRATION; INTRACAUDAL; PERINEURAL at 04:10

## 2018-10-16 RX ADMIN — CHLORHEXIDINE GLUCONATE 10 ML: 1.2 RINSE ORAL at 09:10

## 2018-10-16 NOTE — PROGRESS NOTES
Unable to obtain iv access. Unable x1 left ac by jessa verma rn. Unable left leg by dr orantes. Unable x2 left forearm and left upper arm by dr orantes with use of ultrasound. Per dr orantes, will obtain central line in the or.    Pt has right suprapubic catheter for neurogenic bladder. Catheter connected to drainage bag. Clear yellow urine noted to bag. Catheter plug cleaned with alcohol and tip of bag sterility maintained.    Pt c/o gen pain 9/10. Oxycodone IR 5mg po now ordered by dr orantes due to no iv access.

## 2018-10-16 NOTE — H&P (VIEW-ONLY)
[facility] General Surgery  General Surgery  History & Physical    Patient Name: Desirae No  MRN: [id number]  Admission Date: (Not on file)  Attending Physician:   Marquis Marrero MD  Primary Care Provider: Fauzia Muro MD    Patient information was obtained from patient and ER records.     Subjective:     Chief Complaint/Reason for Admission:  Tender abscess on patient's right axilla for the duration of 2 weeks.    History of Present Illness:  Patient is a 68 y.o. female presents with known tender abscess and infection involving her right axilla. Onset of symptoms was gradual starting 2 weeks ago with rapidly worsening course since that time. Patient denies fever and chills.  And this is the 1st time she has such an infection.  It started out with a small boil in the area.    Current Outpatient Medications on File Prior to Visit   Medication Sig    aspirin (ECOTRIN) 81 MG EC tablet Take 1 tablet (81 mg total) by mouth once daily.    atorvastatin (LIPITOR) 10 MG tablet Take 1 tablet (10 mg total) by mouth every evening.    clonazepam (KLONOPIN) 2 MG Tab Take 2 mg by mouth 2 (two) times daily.    mupirocin (BACTROBAN) 2 % ointment Apply topically 3 (three) times daily.    oxybutynin (DITROPAN) 5 MG Tab Take 5 mg by mouth 2 (two) times daily.     oxycodone (ROXICODONE) 30 MG Tab Take 30 mg by mouth every 6 (six) hours as needed.    polyethylene glycol (GLYCOLAX) 17 gram PwPk Take 17 g by mouth.    promethazine (PHENERGAN) 25 MG tablet Take 1 tablet (25 mg total) by mouth every 6 (six) hours as needed for Nausea.    quetiapine (SEROQUEL) 400 MG tablet Take 400 mg by mouth every evening.    sulfamethoxazole-trimethoprim 800-160mg (BACTRIM DS) 800-160 mg Tab Take 1 tablet by mouth 2 (two) times daily.    tizanidine (ZANAFLEX) 4 MG tablet     XTAMPZA ER 27 mg CSpT TAKE TWO CAPSULES BY MOUTH EVERY TWELVE HOURS WITH FOOD    [DISCONTINUED] triamcinolone acetonide 0.1% (KENALOG) 0.1 % cream APPLY TO LEG RASH  TWICE DAILY AS NEEDED     No current facility-administered medications on file prior to visit.        Review of patient's allergies indicates:   Allergen Reactions    Nucynta [tapentadol] Nausea And Vomiting    Prochlorperazine Nausea And Vomiting    Stadol [butorphanol tartrate] Nausea And Vomiting    Metoclopramide     Morphine (pf)     Reglan [metoclopramide hcl]     Toradol [ketorolac]     Zofran [ondansetron hcl (pf)]        Past Medical History:   Diagnosis Date    DDD (degenerative disc disease), lumbar 4/11/2014    Hypertension     Neurogenic bladder     Pulmonary embolism 4/11/2014     Past Surgical History:   Procedure Laterality Date    CERVICAL FUSION      CHOLECYSTECTOMY      HYSTERECTOMY  1974    menorrhagia    JOINT REPLACEMENT      neck fusion       Family History     Problem Relation (Age of Onset)    Stroke Father        Tobacco Use    Smoking status: Never Smoker    Smokeless tobacco: Never Used   Substance and Sexual Activity    Alcohol use: No    Drug use: No    Sexual activity: Yes     Partners: Male     Birth control/protection: None, Surgical     Review of Systems   Constitutional: Positive for activity change. Negative for chills and fever.   HENT: Negative for sore throat and trouble swallowing.    Eyes: Negative.    Respiratory: Negative for cough and shortness of breath.    Cardiovascular: Negative.    Gastrointestinal: Negative for abdominal distention, abdominal pain, nausea and vomiting.   Endocrine: Negative.    Genitourinary: Negative.    Musculoskeletal: Negative.    Skin: Negative.    Allergic/Immunologic: Negative.    Neurological: Negative.    Hematological: Does not bruise/bleed easily.   Psychiatric/Behavioral: Negative.      Objective:     Vital Signs (Most Recent):  Pulse: 88 (10/16/18 0925)  BP: 118/78 (10/16/18 0925) Vital Signs (24h Range):  [unfilled]     Weight: 102.6 kg (226 lb 3.1 oz)  Body mass index is 34.39 kg/m².    Physical Exam    Constitutional: She is oriented to person, place, and time. She appears well-developed and well-nourished.   HENT:   Head: Normocephalic.   Right Ear: External ear normal.   Left Ear: External ear normal.   Nose: Nose normal.   Small bump on patient's right forehead.   Eyes: Pupils are equal, round, and reactive to light. No scleral icterus.   Neck: Normal range of motion. Neck supple. No thyromegaly present.   Cardiovascular: Normal rate, regular rhythm and normal heart sounds.   No murmur heard.  Pulmonary/Chest: Effort normal and breath sounds normal.   Abdominal: Soft. Bowel sounds are normal. There is no tenderness. There is no guarding.   Musculoskeletal: Normal range of motion.   Operation on her right knee.   Lymphadenopathy:     She has no cervical adenopathy.   Neurological: She is alert and oriented to person, place, and time.   Skin: Skin is warm and dry.   Induration and tender abscess on patient's right axilla, and measure approximately 10 cm and diameter.       Significant Labs:  CBC:   CMP:     Significant Diagnostics:  I have reviewed and interpreted all pertinent imaging results/findings within the past 24 hours.    Assessment/Plan:      Incision and drainage under general anesthesia.  The risk and the benefit of the procedure were fully addressed with the patient.      VTE Risk Mitigation (From admission, onward)        Ordered     IP VTE LOW RISK PATIENT  Once      10/16/18 3165          Marquis Marrero MD  General Surgery  [facility] - General Surgery

## 2018-10-16 NOTE — ANESTHESIA PROCEDURE NOTES
Central Line    Diagnosis: right axillary abscess  Patient location during procedure: done in OR  Procedure start time: 10/16/2018 4:11 PM  Timeout: 10/16/2018 4:10 PM  Procedure end time: 10/16/2018 4:25 PM  Staffing  Anesthesiologist: Mena Pagan DO  Performed: anesthesiologist   Anesthesiologist was present at the time of the procedure.  Preanesthetic Checklist  Completed: patient identified, site marked, surgical consent, pre-op evaluation, timeout performed, IV checked, risks and benefits discussed, monitors and equipment checked and anesthesia consent given  Indication  Indication: hemodynamic monitoring, vascular access     Anesthesia   local infiltration    Central Line  Skin Prep: skin prepped with ChloraPrep, skin prep agent completely dried prior to procedure  hand hygiene performed prior to central venous catheter insertion  Location: right internal jugular,   Catheter type: triple lumen  Ultrasound: vascular probe with ultrasound  Vessel Caliber: medium, patent, compressibility normal  Needle advanced into vessel with real time Ultrasound guidance.  Guidewire confirmed in vessel.  Manometry: none  Insertion Attempts: 1   Securement:line sutured, sterile dressing applied and blood return through all ports     Post-Procedure  Adverse Events:none  Additional Notes  CXR to be ordered in PACU

## 2018-10-16 NOTE — LETTER
October 16, 2018      Fauzia Muro MD  [id number] [address] [address]           [facility] - General Surgery  [address] [facility] [address]  Phone: [phone]  Fax: [phone]          Patient: Desirae No   MR Number: [id number]   YOB: 1950   Date of Visit: 10/16/2018       Dear Dr. Fauzia Muro:    Thank you for referring Desirae No to me for evaluation. Attached you will find relevant portions of my assessment and plan of care.    If you have questions, please do not hesitate to call me. I look forward to following Desirae No along with you.    Sincerely,    Marquis Marrero MD    Enclosure  CC:  No Recipients    If you would like to receive this communication electronically, please contact [email or web address] or [phone] to request more information on [employer] access.    For providers and/or their staff who would like to refer a patient to [facility], please contact us through our one-stop-shop provider referral line, [facility], at [phone].    If you feel you have received this communication in error or would no longer like to receive these types of communications, please e-mail [email or web address]

## 2018-10-16 NOTE — ANESTHESIA PREPROCEDURE EVALUATION
10/16/2018  Desirae No is a 68 y.o., female.    Anesthesia Evaluation    I have reviewed the Patient Summary Reports.    I have reviewed the Nursing Notes.      Review of Systems  Anesthesia Hx:  No problems with previous Anesthesia    Social:  Non-Smoker, No Alcohol Use    EENT/Dental:EENT/Dental Normal   Cardiovascular:   Hypertension    Pulmonary:   Hx pulmonary embolism approximately 4 years ago.    Renal/:   Neurogenic bladder.  Suprapubic catheter.     Musculoskeletal:   Arthritis  Cervical fusion. Spine Disorders: cervical Disc disease    Neurological:   Hx of encephalopathy.        Physical Exam  General:  Obesity    Airway/Jaw/Neck:  Airway Findings: Mouth Opening: Normal Pre-Existing Airway Tube(s): Oral Endotracheal tube  General Airway Assessment: Adult  Mallampati: III  TM Distance: 4 - 6 cm  Jaw/Neck Findings:  Neck ROM: Extension Decreased, Mod.       Chest/Lungs:  Chest/Lungs Findings: Clear to auscultation, Normal Respiratory Rate     Heart/Vascular:  Heart Findings: Rate: Normal        Mental Status:  Mental Status Findings:  Cooperative, Alert and Oriented         Anesthesia Plan  Type of Anesthesia, risks & benefits discussed:  Anesthesia Type:  general  Patient's Preference:   Intra-op Monitoring Plan: standard ASA monitors  Intra-op Monitoring Plan Comments:   Post Op Pain Control Plan: multimodal analgesia  Post Op Pain Control Plan Comments:   Induction:   IV  Beta Blocker:  Patient is not currently on a Beta-Blocker (No further documentation required).       Informed Consent: Patient understands risks and agrees with Anesthesia plan.  Questions answered. Anesthesia consent signed with patient.  ASA Score: 3     Day of Surgery Review of History & Physical: I have interviewed and examined the patient. I have reviewed the patient's H&P dated:            Ready For Surgery From  Anesthesia Perspective.

## 2018-10-16 NOTE — OP NOTE
[facility]  Surgery Department  Operative Note    SUMMARY     Date of Procedure: 10/16/2018     Procedure: Procedure(s) (LRB):  INCISION AND DRAINAGE, ABSCESS (Right)     Surgeon(s) and Role:     * Miguel Verduzco MD - Primary    Assisting Surgeon: None    Pre-Operative Diagnosis: Axillary abscess [L02.419]    Post-Operative Diagnosis: Post-Op Diagnosis Codes:     * Axillary abscess [L02.419]    Anesthesia: General    Technical Procedures Used:  Incision and drainage with debridement of skin and subcutaneous tissue.  The debridement of the skin and subcutaneous tissue was done sharply using electrocautery    Description of the Findings of the Procedure:     The patient was brought in the operative room placed on the operative table supine position. A central venous line was placed by the anesthesia service.  IV sedation was provided by the anesthesia service.  IV antibiotics were administered.  Pneumatic compression devices on lower extremity.  A suprapubic tube was already in place. The right axilla was prepped and draped in the standard manner.    A time-out was performed.    Lidocaine was infiltrated.  A small incision was made on the most inferior area of fluctuance and erythema.  Purulent material was drained.  The middle area of fluctuance and purulent material with erythema was incised and purulent material was drained.    The superior area of fluctuance, erythema and purulent material was incised and copious amounts of purulent material were drained.    The upper incision was enlarged and the finger was inserted.  The abscess cavity extended for approximately 6 cm into her axillary fat.  There was communication between the superior and middle incision.    The skin and subcutaneous tissue between the superior and middle incision was sharply excised using electrocautery.  Hemostasis was achieved using electrocautery.  Additional lidocaine was infiltrated.  All areas of loculations were  disrupted.  The most inferior abscess connected to the larger abscess cavity.    The wound was irrigated.  Marcaine was infiltrated.  The wound was packed with 3 pieces of 4 x 4 gauze.  Dry sterile dressings were placed.    Patient tolerated the procedure well. Counts were correct    Significant Surgical Tasks Conducted by the Assistant(s), if Applicable:  None    Complications: No    Estimated Blood Loss (EBL): 15 mL  IV fluids approximately 200 mL  Marcaine 0.5% 30 mL  Lidocaine 1% 20 mL           Implants: * No implants in log *    Specimens:   Specimen (12h ago, onward)    None                  Condition: Stable    Disposition: PACU - hemodynamically stable.    Attestation: I performed the procedure.

## 2018-10-16 NOTE — H&P
[facility] General Surgery  General Surgery  History & Physical    Patient Name: Desirae No  MRN: [id number]  Admission Date: (Not on file)  Attending Physician:   Marquis Marrero MD  Primary Care Provider: Fauzia Muro MD    Patient information was obtained from patient and ER records.     Subjective:     Chief Complaint/Reason for Admission:  Tender abscess on patient's right axilla for the duration of 2 weeks.    History of Present Illness:  Patient is a 68 y.o. female presents with known tender abscess and infection involving her right axilla. Onset of symptoms was gradual starting 2 weeks ago with rapidly worsening course since that time. Patient denies fever and chills.  And this is the 1st time she has such an infection.  It started out with a small boil in the area.    Current Outpatient Medications on File Prior to Visit   Medication Sig    aspirin (ECOTRIN) 81 MG EC tablet Take 1 tablet (81 mg total) by mouth once daily.    atorvastatin (LIPITOR) 10 MG tablet Take 1 tablet (10 mg total) by mouth every evening.    clonazepam (KLONOPIN) 2 MG Tab Take 2 mg by mouth 2 (two) times daily.    mupirocin (BACTROBAN) 2 % ointment Apply topically 3 (three) times daily.    oxybutynin (DITROPAN) 5 MG Tab Take 5 mg by mouth 2 (two) times daily.     oxycodone (ROXICODONE) 30 MG Tab Take 30 mg by mouth every 6 (six) hours as needed.    polyethylene glycol (GLYCOLAX) 17 gram PwPk Take 17 g by mouth.    promethazine (PHENERGAN) 25 MG tablet Take 1 tablet (25 mg total) by mouth every 6 (six) hours as needed for Nausea.    quetiapine (SEROQUEL) 400 MG tablet Take 400 mg by mouth every evening.    sulfamethoxazole-trimethoprim 800-160mg (BACTRIM DS) 800-160 mg Tab Take 1 tablet by mouth 2 (two) times daily.    tizanidine (ZANAFLEX) 4 MG tablet     XTAMPZA ER 27 mg CSpT TAKE TWO CAPSULES BY MOUTH EVERY TWELVE HOURS WITH FOOD    [DISCONTINUED] triamcinolone acetonide 0.1% (KENALOG) 0.1 % cream APPLY TO LEG RASH  TWICE DAILY AS NEEDED     No current facility-administered medications on file prior to visit.        Review of patient's allergies indicates:   Allergen Reactions    Nucynta [tapentadol] Nausea And Vomiting    Prochlorperazine Nausea And Vomiting    Stadol [butorphanol tartrate] Nausea And Vomiting    Metoclopramide     Morphine (pf)     Reglan [metoclopramide hcl]     Toradol [ketorolac]     Zofran [ondansetron hcl (pf)]        Past Medical History:   Diagnosis Date    DDD (degenerative disc disease), lumbar 4/11/2014    Hypertension     Neurogenic bladder     Pulmonary embolism 4/11/2014     Past Surgical History:   Procedure Laterality Date    CERVICAL FUSION      CHOLECYSTECTOMY      HYSTERECTOMY  1974    menorrhagia    JOINT REPLACEMENT      neck fusion       Family History     Problem Relation (Age of Onset)    Stroke Father        Tobacco Use    Smoking status: Never Smoker    Smokeless tobacco: Never Used   Substance and Sexual Activity    Alcohol use: No    Drug use: No    Sexual activity: Yes     Partners: Male     Birth control/protection: None, Surgical     Review of Systems   Constitutional: Positive for activity change. Negative for chills and fever.   HENT: Negative for sore throat and trouble swallowing.    Eyes: Negative.    Respiratory: Negative for cough and shortness of breath.    Cardiovascular: Negative.    Gastrointestinal: Negative for abdominal distention, abdominal pain, nausea and vomiting.   Endocrine: Negative.    Genitourinary: Negative.    Musculoskeletal: Negative.    Skin: Negative.    Allergic/Immunologic: Negative.    Neurological: Negative.    Hematological: Does not bruise/bleed easily.   Psychiatric/Behavioral: Negative.      Objective:     Vital Signs (Most Recent):  Pulse: 88 (10/16/18 0925)  BP: 118/78 (10/16/18 0925) Vital Signs (24h Range):  [unfilled]     Weight: 102.6 kg (226 lb 3.1 oz)  Body mass index is 34.39 kg/m².    Physical Exam    Constitutional: She is oriented to person, place, and time. She appears well-developed and well-nourished.   HENT:   Head: Normocephalic.   Right Ear: External ear normal.   Left Ear: External ear normal.   Nose: Nose normal.   Small bump on patient's right forehead.   Eyes: Pupils are equal, round, and reactive to light. No scleral icterus.   Neck: Normal range of motion. Neck supple. No thyromegaly present.   Cardiovascular: Normal rate, regular rhythm and normal heart sounds.   No murmur heard.  Pulmonary/Chest: Effort normal and breath sounds normal.   Abdominal: Soft. Bowel sounds are normal. There is no tenderness. There is no guarding.   Musculoskeletal: Normal range of motion.   Operation on her right knee.   Lymphadenopathy:     She has no cervical adenopathy.   Neurological: She is alert and oriented to person, place, and time.   Skin: Skin is warm and dry.   Induration and tender abscess on patient's right axilla, and measure approximately 10 cm and diameter.       Significant Labs:  CBC:   CMP:     Significant Diagnostics:  I have reviewed and interpreted all pertinent imaging results/findings within the past 24 hours.    Assessment/Plan:      Incision and drainage under general anesthesia.  The risk and the benefit of the procedure were fully addressed with the patient.      VTE Risk Mitigation (From admission, onward)        Ordered     IP VTE LOW RISK PATIENT  Once      10/16/18 3604          Marquis Marrero MD  General Surgery  [facility] - General Surgery

## 2018-10-16 NOTE — PLAN OF CARE
Escorted to preop via wheelchair to preop. Pt uses walker at home. Slight unsteady on her feet but able to climb into bed. preop completed and pt awaiting surgery.

## 2018-10-16 NOTE — TRANSFER OF CARE
"Anesthesia Transfer of Care Note    Patient: Desirae No    Procedure(s) Performed: Procedure(s) (LRB):  INCISION AND DRAINAGE, ABSCESS (Right)    Patient location: PACU    Anesthesia Type: MAC    Transport from OR: Transported from OR on room air with adequate spontaneous ventilation    Post pain: adequate analgesia    Post assessment: no apparent anesthetic complications    Post vital signs: stable    Level of consciousness: awake, alert and oriented    Nausea/Vomiting: no nausea/vomiting    Complications: none    Transfer of care protocol was followed      Last vitals:   Visit Vitals  BP (!) 160/85   Pulse 87   Temp 36.8 °C (98.3 °F) (Temporal)   Resp 15   Ht 5' 8" (1.727 m)   Wt 101.7 kg (224 lb 3 oz)   LMP 04/24/1974   SpO2 (!) 94%   Breastfeeding? No   BMI 34.09 kg/m²     "

## 2018-10-16 NOTE — INTERVAL H&P NOTE
The patient has been examined and the H&P has been reviewed:    I concur with the findings and no changes have occurred since H&P was written.    Anesthesia/Surgery risks, benefits and alternative options discussed and understood by patient/family.          Active Hospital Problems    Diagnosis  POA    *Axillary abscess [L02.419]  Yes      Resolved Hospital Problems   No resolved problems to display.

## 2018-10-17 LAB
ANION GAP SERPL CALC-SCNC: 7 MMOL/L
BASOPHILS # BLD AUTO: 0.01 K/UL
BASOPHILS NFR BLD: 0.1 %
BUN SERPL-MCNC: 7 MG/DL
CALCIUM SERPL-MCNC: 9.5 MG/DL
CHLORIDE SERPL-SCNC: 102 MMOL/L
CO2 SERPL-SCNC: 30 MMOL/L
CREAT SERPL-MCNC: 0.8 MG/DL
DIFFERENTIAL METHOD: ABNORMAL
EOSINOPHIL # BLD AUTO: 0.2 K/UL
EOSINOPHIL NFR BLD: 2.3 %
ERYTHROCYTE [DISTWIDTH] IN BLOOD BY AUTOMATED COUNT: 13.2 %
EST. GFR  (AFRICAN AMERICAN): >60 ML/MIN/1.73 M^2
EST. GFR  (NON AFRICAN AMERICAN): >60 ML/MIN/1.73 M^2
GLUCOSE SERPL-MCNC: 93 MG/DL
HCT VFR BLD AUTO: 38.4 %
HGB BLD-MCNC: 12.1 G/DL
LYMPHOCYTES # BLD AUTO: 1.5 K/UL
LYMPHOCYTES NFR BLD: 18.9 %
MCH RBC QN AUTO: 28.8 PG
MCHC RBC AUTO-ENTMCNC: 31.5 G/DL
MCV RBC AUTO: 91 FL
MONOCYTES # BLD AUTO: 1 K/UL
MONOCYTES NFR BLD: 12.8 %
NEUTROPHILS # BLD AUTO: 5.3 K/UL
NEUTROPHILS NFR BLD: 65.9 %
PLATELET # BLD AUTO: 315 K/UL
PMV BLD AUTO: 8.8 FL
POTASSIUM SERPL-SCNC: 3.6 MMOL/L
RBC # BLD AUTO: 4.2 M/UL
SODIUM SERPL-SCNC: 139 MMOL/L
WBC # BLD AUTO: 8 K/UL

## 2018-10-17 PROCEDURE — 63600175 PHARM REV CODE 636 W HCPCS: Performed by: SURGERY

## 2018-10-17 PROCEDURE — 85025 COMPLETE CBC W/AUTO DIFF WBC: CPT

## 2018-10-17 PROCEDURE — 80048 BASIC METABOLIC PNL TOTAL CA: CPT

## 2018-10-17 PROCEDURE — 25000003 PHARM REV CODE 250: Performed by: SURGERY

## 2018-10-17 PROCEDURE — 11000001 HC ACUTE MED/SURG PRIVATE ROOM

## 2018-10-17 RX ORDER — ENOXAPARIN SODIUM 100 MG/ML
40 INJECTION SUBCUTANEOUS EVERY 24 HOURS
Status: DISCONTINUED | OUTPATIENT
Start: 2018-10-17 | End: 2018-10-19 | Stop reason: HOSPADM

## 2018-10-17 RX ORDER — SULFAMETHOXAZOLE AND TRIMETHOPRIM 400; 80 MG/1; MG/1
1 TABLET ORAL 2 TIMES DAILY
Status: DISCONTINUED | OUTPATIENT
Start: 2018-10-17 | End: 2018-10-19 | Stop reason: HOSPADM

## 2018-10-17 RX ADMIN — CHLORHEXIDINE GLUCONATE 10 ML: 1.2 RINSE ORAL at 08:10

## 2018-10-17 RX ADMIN — ASPIRIN 81 MG: 81 TABLET, COATED ORAL at 08:10

## 2018-10-17 RX ADMIN — TIZANIDINE 4 MG: 4 TABLET ORAL at 10:10

## 2018-10-17 RX ADMIN — OXYCODONE HYDROCHLORIDE 15 MG: 5 TABLET ORAL at 08:10

## 2018-10-17 RX ADMIN — OXYBUTYNIN CHLORIDE 5 MG: 5 TABLET ORAL at 08:10

## 2018-10-17 RX ADMIN — QUETIAPINE FUMARATE 400 MG: 100 TABLET ORAL at 08:10

## 2018-10-17 RX ADMIN — ATORVASTATIN CALCIUM 10 MG: 10 TABLET, FILM COATED ORAL at 08:10

## 2018-10-17 RX ADMIN — ENOXAPARIN SODIUM 40 MG: 100 INJECTION SUBCUTANEOUS at 05:10

## 2018-10-17 RX ADMIN — OXYCODONE HYDROCHLORIDE 15 MG: 5 TABLET ORAL at 04:10

## 2018-10-17 RX ADMIN — SULFAMETHOXAZOLE AND TRIMETHOPRIM 1 TABLET: 400; 80 TABLET ORAL at 08:10

## 2018-10-17 RX ADMIN — HYDROMORPHONE HYDROCHLORIDE 1 MG: 1 INJECTION, SOLUTION INTRAMUSCULAR; INTRAVENOUS; SUBCUTANEOUS at 05:10

## 2018-10-17 RX ADMIN — HYDROMORPHONE HYDROCHLORIDE 1 MG: 1 INJECTION, SOLUTION INTRAMUSCULAR; INTRAVENOUS; SUBCUTANEOUS at 11:10

## 2018-10-17 RX ADMIN — POLYETHYLENE GLYCOL 3350 17 G: 17 POWDER, FOR SOLUTION ORAL at 08:10

## 2018-10-17 RX ADMIN — OXYCODONE HYDROCHLORIDE 15 MG: 5 TABLET ORAL at 01:10

## 2018-10-17 RX ADMIN — HYDROMORPHONE HYDROCHLORIDE 1 MG: 1 INJECTION, SOLUTION INTRAMUSCULAR; INTRAVENOUS; SUBCUTANEOUS at 10:10

## 2018-10-17 RX ADMIN — TIZANIDINE 4 MG: 4 TABLET ORAL at 05:10

## 2018-10-17 RX ADMIN — TIZANIDINE 4 MG: 4 TABLET ORAL at 01:10

## 2018-10-17 RX ADMIN — VANCOMYCIN HYDROCHLORIDE 1000 MG: 1 INJECTION, POWDER, LYOPHILIZED, FOR SOLUTION INTRAVENOUS at 08:10

## 2018-10-17 NOTE — PROGRESS NOTES
[facility] -   General Surgery  Progress Note    Subjective:     History of Present Illness:  Patient is a 68 y.o. female presents with known tender abscess and infection involving her right axilla. Onset of symptoms was gradual starting 2 weeks ago with rapidly worsening course since that time. Patient denies fever and chills.  And this is the 1st time she has such an infection.  It started out with a small boil in the area.        Post-Op Info:  Procedure(s) (LRB):  INCISION AND DRAINAGE, ABSCESS (Right)   1 Day Post-Op     Interval History:  Significant pain with wound care, has stuck to case management.  Arrangements for panic go home health in progress    Medications:  Continuous Infusions:   lactated ringers 80 mL/hr at 10/16/18 2118     Scheduled Meds:   aspirin  81 mg Oral Daily    atorvastatin  10 mg Oral QHS    chlorhexidine  10 mL Mouth/Throat BID    nozaseptin   Each Nare BID    oxybutynin  5 mg Oral BID    polyethylene glycol  17 g Oral Daily    QUEtiapine  400 mg Oral QHS    tiZANidine  4 mg Oral Q8H    vancomycin (VANCOCIN) IVPB  1,000 mg Intravenous Q12H     PRN Meds:acetaminophen, clonazePAM, HYDROmorphone, ondansetron, oxyCODONE, promethazine     Review of patient's allergies indicates:   Allergen Reactions    Nucynta [tapentadol] Nausea And Vomiting    Prochlorperazine Nausea And Vomiting    Stadol [butorphanol tartrate] Nausea And Vomiting    Metoclopramide     Morphine (pf)      Pt states she is not allergic to morphine    Reglan [metoclopramide hcl]     Toradol [ketorolac]     Zofran [ondansetron hcl (pf)]      Objective:     Vital Signs (Most Recent):  Temp: 99 °F (37.2 °C) (10/17/18 1231)  Pulse: 79 (10/17/18 1231)  Resp: 18 (10/17/18 1231)  BP: 131/62 (10/17/18 1231)  SpO2: 98 % (10/17/18 1231) Vital Signs (24h Range):  Temp:  [97.5 °F (36.4 °C)-99 °F (37.2 °C)] 99 °F (37.2 °C)  Pulse:  [65-87] 79  Resp:  [12-18] 18  SpO2:  [92 %-98 %] 98 %  BP:  ()/() 131/62     Weight: 101.7 kg (224 lb 3 oz)  Body mass index is 34.09 kg/m².    Intake/Output - Last 3 Shifts       10/15 0700 - 10/16 0659 10/16 0700 - 10/17 0659 10/17 0700 - 10/18 0659    I.V. (mL/kg)  400 (3.9)     Total Intake(mL/kg)  400 (3.9)     Urine (mL/kg/hr)  1000 450 (0.6)    Stool   0    Blood  15     Total Output  1015 450    Net  -615 -450           Stool Occurrence   1 x          Physical Exam   Constitutional:   Morbidly obese   HENT:   Head: Normocephalic and atraumatic.   Eyes: Pupils are equal, round, and reactive to light.   Neck: Normal range of motion.   Cardiovascular: Normal rate, regular rhythm and normal heart sounds.   Pulmonary/Chest: Effort normal and breath sounds normal.   Abdominal: Soft. Bowel sounds are normal.   Suprapubic tube   Neurological: She is alert.   Skin: Capillary refill takes less than 2 seconds.    dressing in the right axilla   Psychiatric: She has a normal mood and affect.   Vitals reviewed.      Significant Labs:  CBC:   Recent Labs   Lab  10/17/18   0440   WBC  8.00   RBC  4.20   HGB  12.1   HCT  38.4   PLT  315   MCV  91   MCH  28.8   MCHC  31.5*     BMP:   Recent Labs   Lab  10/17/18   0440   GLU  93   NA  139   K  3.6   CL  102   CO2  30*   BUN  7*   CREATININE  0.8   CALCIUM  9.5     Microbiology Results (last 7 days)     Procedure Component Value Units Date/Time    Aerobic culture [[id number]] Collected:  10/16/18 1654    Order Status:  Sent Specimen:  Abscess from Arm, Right Updated:  10/16/18 2050          Significant Diagnostics:  CXR: I have reviewed all pertinent results/findings within the past 24 hours and my personal findings are:  No pneumothorax post central    Assessment/Plan:     * Axillary abscess    Severe pain with wound care despite our IV narcotics.    Will keep in house the for IV narcotics prior to wound care tomorrow.  Discharge when wound care is tolerable    Resume Bactrim, stop vancomycin        Suprapubic catheter     Chronic, continue suprapubic catheter to drain        History of pulmonary embolism    SCDs and Lovenox            Miguel Verduzco MD  General Surgery  [facility]

## 2018-10-17 NOTE — CONSULTS
10/17/18 1115   Handoff Report   Given To MARLEN Wallace   Pain/Comfort Assessments   Pain Assessment Performed Yes       Number Scale   Presence of Pain complains of pain/discomfort   Location - Side Right   Location axilla   Pain Rating: Rest 5   Pain Rating: Activity 8   Pain Management Interventions medication   Skin   Skin WDL ex   Skin Temperature warm   Jesse Risk Assessment   Sensory Perception 4-->no impairment   Moisture 3-->occasionally moist   Activity 3-->walks occasionally   Mobility 3-->slightly limited   Nutrition 3-->adequate   Friction and Shear 2-->potential problem   Jesse Score 18       Incision/Site 10/16/18 1650 Right Axilla   Date First Assessed/Time First Assessed: 10/16/18 1650   Side: Right  Location: Axilla   Wound Image    Incision WDL ex   Dressing Appearance Moist drainage;Intact   Drainage Amount Moderate   Drainage Characteristics/Odor Serosanguineous   Appearance Red;Yellow;Moist   Periwound Area Dry;Intact   Wound Edges Open   Wound Length (cm) 2 cm   Wound Width (cm) 4 cm   Wound Depth (cm) 6 cm   Wound Volume (cm^3) 48 cm^3   Care Cleansed with:;Sterile normal saline;Applied:;Skin Barrier   Dressing Hydrofiber;Silver;Gauze;Abd pad   Packing other (see comments)  (aquacel AG rope)   Packing Inserted  2   Packing Removed 4   Periwound Care Absorptive dressing applied;Cleansed with pH balanced cleanser;Dry periwound area maintained;Skin barrier film applied   Dressing Change Due 10/18/18   Skin Interventions   Pressure Reduction Devices Pressure-redistributing mattress utilized   Pressure Reduction Techniques frequent weight shift encouraged   Skin Protection Adhesive use limited;Incontinence pads       Consulted on this 67 y/o F patient for management of I&D site to right axilla by Dr. Verduzco. She has PMH significant for DDD, HTN, neurogenic bladder, PE.  Patient premedicated for pain per primary nurse. Surgical dressing removed. I&D site x2 noted. Superior Wound measures   [id number]. Distal wound measures 0.5x1x1.5cm.  Cleansed with saline and patted dry.  Wounds filled with Aquacel AG rope, topped with dry gauze and ABD pad, and secured with Medipore tape. Patient tolerated wound care with severe pain despite premedication. Patient will benefit from  wound care services.  Recommend daily wound care x3 days, then every 2-3 days depending on progression. Please see below for wound care recommendations:      Right axilla I&D site:  1. Cleanse with saline  2. Pat dry  3. Fill wounds with Aquacel AG rope   4. Top with dry 4x4 and ABD pad  5. Secure with Medipore tape

## 2018-10-17 NOTE — ASSESSMENT & PLAN NOTE
Severe pain with wound care despite our IV narcotics.    Will keep in house the for IV narcotics prior to wound care tomorrow.  Discharge when wound care is tolerable    Resume Bactrim, stop vancomycin

## 2018-10-17 NOTE — PLAN OF CARE
Problem: Patient Care Overview  Goal: Plan of Care Review  Outcome: Ongoing (interventions implemented as appropriate)  Fall precautions maintained. Pt free from falls/injuries.  Patient complains of pain. Pain controlled with PRN meds.  Antibiotics given as prescribed.  Ambulates and repositions with assistance.  Plan of care and medications discussed with patient.  Patient verbalized understanding.  Bed locked and low, call bell within reach.  Chart check done. Will continue to monitor.

## 2018-10-17 NOTE — HPI
Patient is a 68 y.o. female presents with known tender abscess and infection involving her right axilla. Onset of symptoms was gradual starting 2 weeks ago with rapidly worsening course since that time. Patient denies fever and chills.  And this is the 1st time she has such an infection.  It started out with a small boil in the area.

## 2018-10-17 NOTE — PLAN OF CARE
Met with pt at bedside.  States she is current with [facility] and wishes to continue services post acute discharge.  [place] of choice form signed per pt.  Family will provide d/c transportation home.  Her PCP is Fauzia Muro MD.  Her pharmacy of choice is   [facility] [address]  [address] [address]  Phone: [phone] Fax: [phone]

## 2018-10-17 NOTE — PLAN OF CARE
Problem: Patient Care Overview  Goal: Plan of Care Review  Outcome: Ongoing (interventions implemented as appropriate)  No acute distress noted. Safety measures are in place. Call light within reach. Pain is being managed. drsg dry and intact . Iv fluids infusing. Pt free of falls this shift. Will continue to monitor. Chart check complete.

## 2018-10-17 NOTE — HOSPITAL COURSE
POD 1 Patient has severe pain with wound care.  She has discussed home health, she has had pedicle in the past.  POD 2:  Less pain with dressing change, tm 100.8  POD 3:  Less pain with with wound care, afebrile, staff darrell cultured, likely MRSA

## 2018-10-17 NOTE — SUBJECTIVE & OBJECTIVE
Interval History:  Significant pain with wound care, has stuck to case management.  Arrangements for panic go home health in progress    Medications:  Continuous Infusions:   lactated ringers 80 mL/hr at 10/16/18 2118     Scheduled Meds:   aspirin  81 mg Oral Daily    atorvastatin  10 mg Oral QHS    chlorhexidine  10 mL Mouth/Throat BID    nozaseptin   Each Nare BID    oxybutynin  5 mg Oral BID    polyethylene glycol  17 g Oral Daily    QUEtiapine  400 mg Oral QHS    tiZANidine  4 mg Oral Q8H    vancomycin (VANCOCIN) IVPB  1,000 mg Intravenous Q12H     PRN Meds:acetaminophen, clonazePAM, HYDROmorphone, ondansetron, oxyCODONE, promethazine     Review of patient's allergies indicates:   Allergen Reactions    Nucynta [tapentadol] Nausea And Vomiting    Prochlorperazine Nausea And Vomiting    Stadol [butorphanol tartrate] Nausea And Vomiting    Metoclopramide     Morphine (pf)      Pt states she is not allergic to morphine    Reglan [metoclopramide hcl]     Toradol [ketorolac]     Zofran [ondansetron hcl (pf)]      Objective:     Vital Signs (Most Recent):  Temp: 99 °F (37.2 °C) (10/17/18 1231)  Pulse: 79 (10/17/18 1231)  Resp: 18 (10/17/18 1231)  BP: 131/62 (10/17/18 1231)  SpO2: 98 % (10/17/18 1231) Vital Signs (24h Range):  Temp:  [97.5 °F (36.4 °C)-99 °F (37.2 °C)] 99 °F (37.2 °C)  Pulse:  [65-87] 79  Resp:  [12-18] 18  SpO2:  [92 %-98 %] 98 %  BP: ()/() 131/62     Weight: 101.7 kg (224 lb 3 oz)  Body mass index is 34.09 kg/m².    Intake/Output - Last 3 Shifts       10/15 0700 - 10/16 0659 10/16 0700 - 10/17 0659 10/17 0700 - 10/18 0659    I.V. (mL/kg)  400 (3.9)     Total Intake(mL/kg)  400 (3.9)     Urine (mL/kg/hr)  1000 450 (0.6)    Stool   0    Blood  15     Total Output  1015 450    Net  -615 -450           Stool Occurrence   1 x          Physical Exam   Constitutional:   Morbidly obese   HENT:   Head: Normocephalic and atraumatic.   Eyes: Pupils are equal, round, and reactive to  light.   Neck: Normal range of motion.   Cardiovascular: Normal rate, regular rhythm and normal heart sounds.   Pulmonary/Chest: Effort normal and breath sounds normal.   Abdominal: Soft. Bowel sounds are normal.   Suprapubic tube   Neurological: She is alert.   Skin: Capillary refill takes less than 2 seconds.    dressing in the right axilla   Psychiatric: She has a normal mood and affect.   Vitals reviewed.      Significant Labs:  CBC:   Recent Labs   Lab  10/17/18   0440   WBC  8.00   RBC  4.20   HGB  12.1   HCT  38.4   PLT  315   MCV  91   MCH  28.8   MCHC  31.5*     BMP:   Recent Labs   Lab  10/17/18   0440   GLU  93   NA  139   K  3.6   CL  102   CO2  30*   BUN  7*   CREATININE  0.8   CALCIUM  9.5     Microbiology Results (last 7 days)     Procedure Component Value Units Date/Time    Aerobic culture [[id number]] Collected:  10/16/18 1654    Order Status:  Sent Specimen:  Abscess from Arm, Right Updated:  10/16/18 2050          Significant Diagnostics:  CXR: I have reviewed all pertinent results/findings within the past 24 hours and my personal findings are:  No pneumothorax post central

## 2018-10-17 NOTE — CONSULTS
Patient currently has [facility] and would like to resume upon discharge. Choice form obtained and referral submitted in [facility].

## 2018-10-18 PROCEDURE — 21400001 HC TELEMETRY ROOM

## 2018-10-18 PROCEDURE — 63600175 PHARM REV CODE 636 W HCPCS: Performed by: SURGERY

## 2018-10-18 PROCEDURE — 11000001 HC ACUTE MED/SURG PRIVATE ROOM

## 2018-10-18 PROCEDURE — 25000003 PHARM REV CODE 250: Performed by: SURGERY

## 2018-10-18 RX ADMIN — ENOXAPARIN SODIUM 40 MG: 100 INJECTION SUBCUTANEOUS at 05:10

## 2018-10-18 RX ADMIN — TIZANIDINE 4 MG: 4 TABLET ORAL at 03:10

## 2018-10-18 RX ADMIN — OXYCODONE HYDROCHLORIDE 15 MG: 5 TABLET ORAL at 06:10

## 2018-10-18 RX ADMIN — OXYCODONE HYDROCHLORIDE 15 MG: 5 TABLET ORAL at 11:10

## 2018-10-18 RX ADMIN — SULFAMETHOXAZOLE AND TRIMETHOPRIM 1 TABLET: 400; 80 TABLET ORAL at 09:10

## 2018-10-18 RX ADMIN — HYDROMORPHONE HYDROCHLORIDE 1 MG: 1 INJECTION, SOLUTION INTRAMUSCULAR; INTRAVENOUS; SUBCUTANEOUS at 08:10

## 2018-10-18 RX ADMIN — CHLORHEXIDINE GLUCONATE 10 ML: 1.2 RINSE ORAL at 09:10

## 2018-10-18 RX ADMIN — OXYCODONE HYDROCHLORIDE 15 MG: 5 TABLET ORAL at 03:10

## 2018-10-18 RX ADMIN — OXYBUTYNIN CHLORIDE 5 MG: 5 TABLET ORAL at 09:10

## 2018-10-18 RX ADMIN — HYDROMORPHONE HYDROCHLORIDE 1 MG: 1 INJECTION, SOLUTION INTRAMUSCULAR; INTRAVENOUS; SUBCUTANEOUS at 04:10

## 2018-10-18 RX ADMIN — QUETIAPINE FUMARATE 400 MG: 100 TABLET ORAL at 09:10

## 2018-10-18 RX ADMIN — ASPIRIN 81 MG: 81 TABLET, COATED ORAL at 09:10

## 2018-10-18 RX ADMIN — TIZANIDINE 4 MG: 4 TABLET ORAL at 09:10

## 2018-10-18 RX ADMIN — POLYETHYLENE GLYCOL 3350 17 G: 17 POWDER, FOR SOLUTION ORAL at 09:10

## 2018-10-18 RX ADMIN — TIZANIDINE 4 MG: 4 TABLET ORAL at 06:10

## 2018-10-18 RX ADMIN — HYDROMORPHONE HYDROCHLORIDE 1 MG: 1 INJECTION, SOLUTION INTRAMUSCULAR; INTRAVENOUS; SUBCUTANEOUS at 12:10

## 2018-10-18 RX ADMIN — ATORVASTATIN CALCIUM 10 MG: 10 TABLET, FILM COATED ORAL at 09:10

## 2018-10-18 NOTE — ASSESSMENT & PLAN NOTE
Much less pain with wound care     Febrile 100.8    Check am cbc, if afebrile overnight then d/c friday

## 2018-10-18 NOTE — PLAN OF CARE
Problem: Patient Care Overview  Goal: Plan of Care Review  Outcome: Ongoing (interventions implemented as appropriate)  No acute distress noted. Safety measures are in place. Call light within reach. drsg dry and intact. Suprapubic catheter in place. Pain is being managed. Pt free of falls this shift. Will continue to monitor. Chart check complete.

## 2018-10-18 NOTE — PLAN OF CARE
CM contacted [facility] to inform patient did not discharge and plan is to d/c Friday if afebrile.

## 2018-10-18 NOTE — PROGRESS NOTES
[facility] -   General Surgery  Progress Note    Subjective:     History of Present Illness:  Patient is a 68 y.o. female presents with known tender abscess and infection involving her right axilla. Onset of symptoms was gradual starting 2 weeks ago with rapidly worsening course since that time. Patient denies fever and chills.  And this is the 1st time she has such an infection.  It started out with a small boil in the area.        Post-Op Info:  Procedure(s) (LRB):  INCISION AND DRAINAGE, ABSCESS (Right)   2 Days Post-Op     Interval History: much less pain with wound care    Medications:  Continuous Infusions:  Scheduled Meds:   aspirin  81 mg Oral Daily    atorvastatin  10 mg Oral QHS    chlorhexidine  10 mL Mouth/Throat BID    enoxaparin  40 mg Subcutaneous Daily    nozaseptin   Each Nare BID    oxybutynin  5 mg Oral BID    polyethylene glycol  17 g Oral Daily    QUEtiapine  400 mg Oral QHS    sulfamethoxazole-trimethoprim 400-80mg  1 tablet Oral BID    tiZANidine  4 mg Oral Q8H     PRN Meds:acetaminophen, clonazePAM, HYDROmorphone, ondansetron, oxyCODONE, promethazine     Review of patient's allergies indicates:   Allergen Reactions    Nucynta [tapentadol] Nausea And Vomiting    Prochlorperazine Nausea And Vomiting    Stadol [butorphanol tartrate] Nausea And Vomiting    Metoclopramide     Morphine (pf)      Pt states she is not allergic to morphine    Reglan [metoclopramide hcl]     Toradol [ketorolac]     Zofran [ondansetron hcl (pf)]      Objective:     Vital Signs (Most Recent):  Temp: 99 °F (37.2 °C) (10/18/18 0810)  Pulse: 66 (10/18/18 0810)  Resp: 17 (10/18/18 0810)  BP: 130/78 (10/18/18 0810)  SpO2: (!) 93 % (10/18/18 0810) Vital Signs (24h Range):  Temp:  [98.6 °F (37 °C)-100.8 °F (38.2 °C)] 99 °F (37.2 °C)  Pulse:  [66-94] 66  Resp:  [17-20] 17  SpO2:  [93 %-98 %] 93 %  BP: (114-132)/(56-78) 130/78     Weight: 101.7 kg (224 lb 3 oz)  Body mass index is 34.09  kg/m².    Intake/Output - Last 3 Shifts       10/16 0700 - 10/17 0659 10/17 0700 - 10/18 0659 10/18 0700 - 10/19 0659    I.V. (mL/kg) 400 (3.9)      Total Intake(mL/kg) 400 (3.9)      Urine (mL/kg/hr) 1000 2500 (1)     Stool  0     Blood 15      Total Output 1015 2500     Net -615 -2500            Stool Occurrence  1 x           Physical Exam   Constitutional: No distress.   obese   HENT:   Head: Normocephalic and atraumatic.   Neck: Normal range of motion. Neck supple.   Cardiovascular: Normal rate, regular rhythm and normal heart sounds.   Pulmonary/Chest: Effort normal.   Abdominal: Soft. Bowel sounds are normal. She exhibits no distension. There is no tenderness.   Genitourinary:   Genitourinary Comments: Suprapubic tube   Neurological: She is alert.   Skin: Skin is warm.   Abscess site is clean, no erythema, no drainage   Vitals reviewed.      Significant Labs:  CBC:   Recent Labs   Lab 10/17/18  0440   WBC 8.00   RBC 4.20   HGB 12.1   HCT 38.4      MCV 91   MCH 28.8   MCHC 31.5*     BMP:   Recent Labs   Lab 10/17/18  0440   GLU 93      K 3.6      CO2 30*   BUN 7*   CREATININE 0.8   CALCIUM 9.5     CMP:   Recent Labs   Lab 10/16/18  1128 10/17/18  0440    93   CALCIUM 10.1 9.5   ALBUMIN 3.7  --    PROT 8.0  --     139   K 4.4 3.6   CO2 24 30*    102   BUN 7* 7*   CREATININE 1.0 0.8   ALKPHOS 108  --    ALT 27  --    AST 25  --    BILITOT 0.7  --        Significant Diagnostics:  none    Assessment/Plan:     * Axillary abscess    Much less pain with wound care     Febrile 100.8    Check am cbc, if afebrile overnight then d/c friday     Suprapubic catheter    Chronic, continue suprapubic catheter to drain     History of pulmonary embolism    SCDs and Lovenox         Miguel Verduzco MD  General Surgery  [facility] -

## 2018-10-18 NOTE — ANESTHESIA POSTPROCEDURE EVALUATION
"Anesthesia Post Evaluation    Patient: Desirae No    Procedure(s) Performed: Procedure(s) (LRB):  INCISION AND DRAINAGE, ABSCESS (Right)    Final Anesthesia Type: general  Patient location during evaluation: PACU  Patient participation: Yes- Able to Participate  Level of consciousness: awake and alert and oriented  Post-procedure vital signs: reviewed and stable  Pain management: adequate  Airway patency: patent  PONV status at discharge: No PONV  Anesthetic complications: no      Cardiovascular status: hemodynamically stable  Respiratory status: unassisted, room air and spontaneous ventilation  Hydration status: euvolemic  Follow-up not needed.        Visit Vitals  /81 (BP Location: Left arm, Patient Position: Lying)   Pulse 72   Temp 37 °C (98.6 °F) (Oral)   Resp 18   Ht 5' 8" (1.727 m)   Wt 101.7 kg (224 lb 3 oz)   LMP 04/24/1974   SpO2 (!) 94%   Breastfeeding? No   BMI 34.09 kg/m²       Pain/Anuj Score: Pain Assessment Performed: Yes (10/18/2018  3:30 AM)  Presence of Pain: complains of pain/discomfort (10/18/2018  3:30 AM)  Pain Rating Prior to Med Admin: 7 (10/18/2018 12:10 PM)  Pain Rating Post Med Admin: 0 (10/18/2018  4:30 AM)        "

## 2018-10-18 NOTE — PROGRESS NOTES
Discussed yanni with Dr. Verduzco. He performed her dressing change this morning. He requests me to perform wound care tomorrow prior to discharging to home. Will continue Aquacel AG rope at that time.

## 2018-10-19 VITALS
TEMPERATURE: 99 F | SYSTOLIC BLOOD PRESSURE: 127 MMHG | WEIGHT: 224.19 LBS | RESPIRATION RATE: 20 BRPM | HEART RATE: 85 BPM | OXYGEN SATURATION: 94 % | HEIGHT: 68 IN | BODY MASS INDEX: 33.98 KG/M2 | DIASTOLIC BLOOD PRESSURE: 86 MMHG

## 2018-10-19 LAB
BACTERIA SPEC AEROBE CULT: NORMAL
BASOPHILS # BLD AUTO: 0.01 K/UL
BASOPHILS NFR BLD: 0.1 %
DIFFERENTIAL METHOD: ABNORMAL
EOSINOPHIL # BLD AUTO: 0.5 K/UL
EOSINOPHIL NFR BLD: 6.8 %
ERYTHROCYTE [DISTWIDTH] IN BLOOD BY AUTOMATED COUNT: 13.2 %
HCT VFR BLD AUTO: 39.9 %
HGB BLD-MCNC: 13 G/DL
LYMPHOCYTES # BLD AUTO: 2.2 K/UL
LYMPHOCYTES NFR BLD: 30.2 %
MCH RBC QN AUTO: 29.6 PG
MCHC RBC AUTO-ENTMCNC: 32.6 G/DL
MCV RBC AUTO: 91 FL
MONOCYTES # BLD AUTO: 0.9 K/UL
MONOCYTES NFR BLD: 12.2 %
NEUTROPHILS # BLD AUTO: 3.6 K/UL
NEUTROPHILS NFR BLD: 50.7 %
PLATELET # BLD AUTO: 353 K/UL
PMV BLD AUTO: 8.7 FL
RBC # BLD AUTO: 4.39 M/UL
WBC # BLD AUTO: 7.19 K/UL

## 2018-10-19 PROCEDURE — 63600175 PHARM REV CODE 636 W HCPCS: Performed by: SURGERY

## 2018-10-19 PROCEDURE — 25000003 PHARM REV CODE 250: Performed by: SURGERY

## 2018-10-19 PROCEDURE — 85025 COMPLETE CBC W/AUTO DIFF WBC: CPT

## 2018-10-19 RX ORDER — SULFAMETHOXAZOLE AND TRIMETHOPRIM 800; 160 MG/1; MG/1
1 TABLET ORAL 2 TIMES DAILY
Qty: 14 TABLET | Refills: 0 | Status: SHIPPED | OUTPATIENT
Start: 2018-10-19 | End: 2018-10-26

## 2018-10-19 RX ADMIN — OXYCODONE HYDROCHLORIDE 15 MG: 5 TABLET ORAL at 09:10

## 2018-10-19 RX ADMIN — HYDROMORPHONE HYDROCHLORIDE 1 MG: 1 INJECTION, SOLUTION INTRAMUSCULAR; INTRAVENOUS; SUBCUTANEOUS at 09:10

## 2018-10-19 RX ADMIN — TIZANIDINE 4 MG: 4 TABLET ORAL at 05:10

## 2018-10-19 RX ADMIN — ASPIRIN 81 MG: 81 TABLET, COATED ORAL at 09:10

## 2018-10-19 RX ADMIN — OXYCODONE HYDROCHLORIDE 15 MG: 5 TABLET ORAL at 01:10

## 2018-10-19 RX ADMIN — HYDROMORPHONE HYDROCHLORIDE 1 MG: 1 INJECTION, SOLUTION INTRAMUSCULAR; INTRAVENOUS; SUBCUTANEOUS at 04:10

## 2018-10-19 RX ADMIN — SULFAMETHOXAZOLE AND TRIMETHOPRIM 1 TABLET: 400; 80 TABLET ORAL at 09:10

## 2018-10-19 RX ADMIN — POLYETHYLENE GLYCOL 3350 17 G: 17 POWDER, FOR SOLUTION ORAL at 09:10

## 2018-10-19 RX ADMIN — OXYBUTYNIN CHLORIDE 5 MG: 5 TABLET ORAL at 09:10

## 2018-10-19 RX ADMIN — CHLORHEXIDINE GLUCONATE 10 ML: 1.2 RINSE ORAL at 09:10

## 2018-10-19 NOTE — PLAN OF CARE
10/19/18 1310   Medicare Message   Important Message from Medicare regarding Discharge Appeal Rights Given to patient/caregiver;Explained to patient/caregiver;Signed/date by patient/caregiver   Date IMM was signed 10/19/18   Time IMM was signed 1313

## 2018-10-19 NOTE — PROGRESS NOTES
Follow up visit with MsVesna Oneal for continued wound care to right axilla I&D site. Patient premedicated for pain per primary nurse.  Dressing and packing removed. Cleansed nura wound with bath wipes. Wounds irrigated with saline. Wound bed moist and red with moderate amount serosanguinous drainage.  Nura wound skin intact.  Wounds x2 filled with Aquacel AG rope, topped with dry 4x4 gauze and ABD pad, and secured with medipore tape. Patient tolerated wound care well. Discussed discharge wound care with patient. She will benefit from  services with dressing changes 3 times per week.

## 2018-10-19 NOTE — DISCHARGE SUMMARY
[facility] -   General Surgery  Discharge Summary      Patient Name: Desirae No  MRN: [id number]  Admission Date: 10/16/2018  Hospital Length of Stay: 2 days  Discharge Date and Time:  10/19/2018 11:27 AM  Attending Physician: Carlos Verduzco MD   Discharging Provider: Vilma Issa PA-C  Primary Care Provider: Fauzia Muro MD    HPI:   Patient is a 68 y.o. female presents with known tender abscess and infection involving her right axilla. Onset of symptoms was gradual starting 2 weeks ago with rapidly worsening course since that time. Patient denies fever and chills.  And this is the 1st time she has such an infection.  It started out with a small boil in the area.        Procedure(s) (LRB):  INCISION AND DRAINAGE, ABSCESS (Right)      Indwelling Lines/Drains at time of discharge:   Lines/Drains/Airways     Central Venous Catheter Line                 Percutaneous Central Line Insertion/Assessment - triple lumen  10/16/18 1611 right internal jugular 2 days          Drain                 Suprapubic Catheter -- days         Urethral Catheter 05/23/17 2100 513 days              Hospital Course: POD 1 Patient has severe pain with wound care.  She has discussed home health, she has had pedicle in the past.  POD 2:  Less pain with dressing change, tm 100.8   POD3: pain improved, afebrile, dressing change tolerated. Home health Is arranged. She was examined on POD3 and found to be fit for discharge.       Consults:   Consults (From admission, onward)        Status Ordering Provider     Inpatient consult to Social Work/Case Management  Once     Provider:  (Not yet assigned)    CARLOS Adams          Significant Diagnostic Studies: Labs:   CBC   Recent Labs   Lab 10/19/18  0400   WBC 7.19   HGB 13.0   HCT 39.9   *       Pending Diagnostic Studies:     None        Final Active Diagnoses:    Diagnosis Date Noted POA    PRINCIPAL PROBLEM:  Axillary abscess [L02.419] 10/16/2018  Yes    Suprapubic catheter [Z93.59] 02/03/2017 Not Applicable     Chronic    History of pulmonary embolism [Z86.711] 04/11/2014 Yes      Problems Resolved During this Admission:      Discharged Condition: good    [place] [facility] [place]:  Follow-up Information     Miguel Verduzco MD In 1 week.    Specialty:  General Surgery  Why:  post op, For wound re-check  Contact information:  [address]  [place] [address] [phone]                 Patient Instructions:      Diet Adult Regular     Lifting restrictions   Order Comments: 20lb limit     No driving until:   Order Comments: No longer taking narcotic pain medication     Notify your health care provider if you experience any of the following:  temperature >100.4     Notify your health care provider if you experience any of the following:  persistent nausea and vomiting or diarrhea     Notify your health care provider if you experience any of the following:  severe uncontrolled pain     Notify your health care provider if you experience any of the following:  redness, tenderness, or signs of infection (pain, swelling, redness, odor or green/yellow discharge around incision site)     Notify your health care provider if you experience any of the following:  difficulty breathing or increased cough     Change dressing (specify)   Order Comments: Dressing change: 1 times per day using gauze or every other day using aquacel Ag.  Ok to shower daily with soap and water immediately prior to dressing change.     Activity as tolerated     Medications:  Reconciled Home Medications:      Medication List      START taking these medications    nozaseptin nasal   Commonly known as:  NOZIN  1 each by Each Nare route 2 (two) times daily. for 7 days        CONTINUE taking these medications    aspirin 81 MG EC tablet  Commonly known as:  ECOTRIN  Take 1 tablet (81 mg total) by mouth once daily.     atorvastatin 10 MG tablet  Commonly  known as:  LIPITOR  Take 1 tablet (10 mg total) by mouth every evening.     clonazePAM 2 MG Tab  Commonly known as:  KLONOPIN  Take 2 mg by mouth 2 (two) times daily. Pt states she takes two tabs at night.     mupirocin 2 % ointment  Commonly known as:  BACTROBAN  Apply topically 3 (three) times daily.     oxybutynin 5 MG Tab  Commonly known as:  DITROPAN  Take 5 mg by mouth 2 (two) times daily.     oxyCODONE 30 MG Tab  Commonly known as:  ROXICODONE  Take 30 mg by mouth every 6 (six) hours as needed.     polyethylene glycol 17 gram Pwpk  Commonly known as:  GLYCOLAX  Take 17 g by mouth.     promethazine 25 MG tablet  Commonly known as:  PHENERGAN  Take 1 tablet (25 mg total) by mouth every 6 (six) hours as needed for Nausea.     QUEtiapine 400 MG tablet  Commonly known as:  SEROQUEL  Take 400 mg by mouth every evening.     sulfamethoxazole-trimethoprim 800-160mg 800-160 mg Tab  Commonly known as:  BACTRIM DS  Take 1 tablet by mouth 2 (two) times daily. for 7 days     tiZANidine 4 MG tablet  Commonly known as:  ZANAFLEX  Take by mouth once daily.     XTAMPZA ER 27 mg Cspt  Generic drug:  oxyCODONE myristate  TAKE TWO CAPSULES BY MOUTH EVERY TWELVE HOURS WITH FOOD          Time spent on the discharge of patient: 25 minutes    Vilma Issa PA-C  General Surgery  [facility] -

## 2018-10-19 NOTE — SUBJECTIVE & OBJECTIVE
Interval History: less pain with wound care    Medications:  Continuous Infusions:  Scheduled Meds:   aspirin  81 mg Oral Daily    atorvastatin  10 mg Oral QHS    chlorhexidine  10 mL Mouth/Throat BID    enoxaparin  40 mg Subcutaneous Daily    nozaseptin   Each Nare BID    oxybutynin  5 mg Oral BID    polyethylene glycol  17 g Oral Daily    QUEtiapine  400 mg Oral QHS    sulfamethoxazole-trimethoprim 400-80mg  1 tablet Oral BID    tiZANidine  4 mg Oral Q8H     PRN Meds:acetaminophen, clonazePAM, HYDROmorphone, ondansetron, oxyCODONE, promethazine     Review of patient's allergies indicates:   Allergen Reactions    Nucynta [tapentadol] Nausea And Vomiting    Prochlorperazine Nausea And Vomiting    Stadol [butorphanol tartrate] Nausea And Vomiting    Metoclopramide     Morphine (pf)      Pt states she is not allergic to morphine    Reglan [metoclopramide hcl]     Toradol [ketorolac]     Zofran [ondansetron hcl (pf)]      Objective:     Vital Signs (Most Recent):  Temp: 98.5 °F (36.9 °C) (10/19/18 1145)  Pulse: 85 (10/19/18 1145)  Resp: 20 (10/19/18 1145)  BP: 127/86 (10/19/18 1145)  SpO2: (!) 94 % (10/19/18 1145) Vital Signs (24h Range):  Temp:  [97.7 °F (36.5 °C)-98.6 °F (37 °C)] 98.5 °F (36.9 °C)  Pulse:  [72-85] 85  Resp:  [18-20] 20  SpO2:  [93 %-99 %] 94 %  BP: (127-151)/(75-93) 127/86     Weight: 101.7 kg (224 lb 3 oz)  Body mass index is 34.09 kg/m².    Intake/Output - Last 3 Shifts       10/17 0700 - 10/18 0659 10/18 0700 - 10/19 0659 10/19 0700 - 10/20 0659    P.O.  360 240    I.V. (mL/kg)       Total Intake(mL/kg)  360 (3.5) 240 (2.4)    Urine (mL/kg/hr) 2500 (1) 1300 (0.5)     Stool 0      Blood       Total Output 2500 1300     Net -2500 -940 +240           Stool Occurrence 1 x            Physical Exam   Constitutional: No distress.   obese   HENT:   Head: Normocephalic and atraumatic.   Neck:   cvl   Cardiovascular: Normal rate, regular rhythm and normal heart sounds.   Pulmonary/Chest:  Effort normal and breath sounds normal.   Abdominal: Soft.   Skin:   Dressed right axilla   Vitals reviewed.      Significant Labs:  CBC:   Recent Labs   Lab 10/19/18  0400   WBC 7.19   RBC 4.39   HGB 13.0   HCT 39.9   *   MCV 91   MCH 29.6   MCHC 32.6     BMP:   Recent Labs   Lab 10/17/18  0440   GLU 93      K 3.6      CO2 30*   BUN 7*   CREATININE 0.8   CALCIUM 9.5     CMP:   Recent Labs   Lab 10/16/18  1128 10/17/18  0440    93   CALCIUM 10.1 9.5   ALBUMIN 3.7  --    PROT 8.0  --     139   K 4.4 3.6   CO2 24 30*    102   BUN 7* 7*   CREATININE 1.0 0.8   ALKPHOS 108  --    ALT 27  --    AST 25  --    BILITOT 0.7  --      Microbiology Results (last 7 days)     Procedure Component Value Units Date/Time    Aerobic culture [[id number]]  (Susceptibility) Collected:  10/16/18 6016    Order Status:  Completed Specimen:  Abscess from Arm, Right Updated:  10/19/18 1101     Aerobic Bacterial Culture --     METHICILLIN RESISTANT STAPHYLOCOCCUS AUREUS  Moderate      Narrative:       Taken from axilla          Significant Diagnostics:  none

## 2018-10-19 NOTE — PLAN OF CARE
Problem: Patient Care Overview  Goal: Plan of Care Review  Outcome: Ongoing (interventions implemented as appropriate)  Patient remained free from injury. C/o incisional pain. PRN pain meds administered as prescribed. Calm. Watching TV. No distress noted. Oriented x3. Respirations even and non labored. IIJ patent and saline locked. Bed locked and low. Call light in reach. Safety measures in place. Will continue to monitor. Reviewed plan of care. Patient verbalized understanding and teach back.    12hr chart check complete.

## 2018-10-19 NOTE — PLAN OF CARE
Problem: Patient Care Overview  Goal: Plan of Care Review  Outcome: Ongoing (interventions implemented as appropriate)  Fall precautions maintained. PT free from falls/injuries; Patient denies any complaints of pain at this time. Plan of care  And medications discussed with patients.Plan of care and medications discussed with patient . Patient verbalized understanding   Will continue to monitor

## 2018-10-19 NOTE — PLAN OF CARE
Met with patient to discuss resuming Home Health services. Patient would like to resume with [facility]. Patient preference form signed and placed in patients blue chart. MSW faxed referral via [employer]. MSW contacted [facility] at [phone] and spoke to [facility]'s  at [facility] to notify of referral sent. Informed [facility] of patients admit date to inpatient status on 10/17, and that patient is being discharged today. Encouraged [facility] to contact MSW with any further needs.

## 2018-10-19 NOTE — PLAN OF CARE
Problem: Patient Care Overview  Goal: Plan of Care Review  Outcome: Outcome(s) achieved Date Met: 10/19/18  Discharged. Remained free from injury. Discharge instructions given. Verbalized understanding. IJ removed., cath tip intact.

## 2018-10-21 NOTE — PLAN OF CARE
10/21/18 1530   Final Note   Assessment Type Final Discharge Note   Discharge Disposition Home-Health   Right Care Referral Info   Post Acute Recommendation Home-care   Facility Name [facility]

## 2018-10-22 ENCOUNTER — PATIENT OUTREACH (OUTPATIENT)
Dept: ADMINISTRATIVE | Facility: CLINIC | Age: 68
End: 2018-10-22

## 2018-10-22 ENCOUNTER — TELEPHONE (OUTPATIENT)
Dept: FAMILY MEDICINE | Facility: CLINIC | Age: 68
End: 2018-10-22

## 2018-10-22 NOTE — PATIENT INSTRUCTIONS
Abscess (Incision & Drainage)  An abscess is sometimes called a boil. It happens when bacteria get trapped under the skin and start to grow. Pus forms inside the abscess as the body responds to the bacteria. An abscess can happen with an insect bite, ingrown hair, blocked oil gland, pimple, cyst, or puncture wound.  Your healthcare provider has drained the pus from your abscess. If the abscess pocket was large, your healthcare provider may have put in gauze packing. Your provider will need to remove it on your next visit. He or she may also replace it at that time. You may not need antibiotics to treat a simple abscess, unless the infection is spreading into the skin around the wound (cellulitis).  The wound will take about 1 to 2 weeks to heal, depending on the size of the abscess. Healthy tissue will grow from the bottom and sides of the opening until it seals over.  Home care  These tips can help your wound heal:  · The wound may drain for the first 2 days. Cover the wound with a clean dry dressing. Change the dressing if it becomes soaked with blood or pus.  · If a gauze packing was placed inside the abscess pocket, you may be told to remove it yourself. You may do this in the shower. Once the packing is removed, you should wash the area in the shower, or clean the area as directed by your provider. Continue to do this until the skin opening has closed. Make sure you wash your hands after changing the packing or cleaning the wound.  · If you were prescribed antibiotics, take them as directed until they are all gone.  · You may use acetaminophen or ibuprofen to control pain, unless another pain medicine was prescribed. If you have liver disease or ever had a stomach ulcer, talk with your doctor before using these medicines.  Follow-up care  Follow up with your healthcare provider, or as advised. If a gauze packing was put in your wound, it should be removed in 1 to 2 days. Check your wound every day for any  signs that the infection is getting worse. The signs are listed below.  When to seek medical advice  Call your healthcare provider right away if any of these occur:  · Increasing redness or swelling  · Red streaks in the skin leading away from the wound  · Increasing local pain or swelling  · Continued pus draining from the wound 2 days after treatment  · Fever of 100.4ºF (38ºC) or higher, or as directed by your healthcare provider  · Boil returns when you are at home  Date Last Reviewed: 9/1/2016  © 1677-7357 [employer]. [address] [place] [id number]. All rights reserved. This information is not intended as a substitute for professional medical care. Always follow your healthcare professional's instructions.

## 2018-10-22 NOTE — PROGRESS NOTES
Jonna Wang RN attempted to contact patient. The following occurred:   C3 nurse attempted to contact Desirae No for a TCC post hospital discharge follow up call. The patient is unable to conduct the call @ this time. The patient requested a callback.    The patient has a scheduled HOSFU appointment with Fauzia Muro MD  on 10/23/18 @ [id number]. Message not sent to Physician staff.

## 2018-10-22 NOTE — TELEPHONE ENCOUNTER
Spoke to the pts HH. They state that the pt had a I and D  of the right upper arm and is really weak and HH thinks pt will benefit from PT in the home. Please advise. I can give a verbal

## 2018-10-22 NOTE — TELEPHONE ENCOUNTER
----- Message from Yousuf Zafar sent at 10/22/2018  8:48 AM CDT -----  Contact: [facility] Nurse (zhou)  Please give zhou a call at   [phone] regarding getting physical therapy in addition to skilled therapy.

## 2018-11-08 ENCOUNTER — OFFICE VISIT (OUTPATIENT)
Dept: SURGERY | Facility: CLINIC | Age: 68
End: 2018-11-08
Payer: MEDICARE

## 2018-11-08 VITALS
WEIGHT: 228.38 LBS | SYSTOLIC BLOOD PRESSURE: 105 MMHG | TEMPERATURE: 99 F | DIASTOLIC BLOOD PRESSURE: 66 MMHG | BODY MASS INDEX: 34.61 KG/M2 | HEIGHT: 68 IN | HEART RATE: 88 BPM

## 2018-11-08 DIAGNOSIS — L02.419 AXILLARY ABSCESS: Primary | ICD-10-CM

## 2018-11-08 PROCEDURE — 99024 POSTOP FOLLOW-UP VISIT: CPT | Mod: HCNC,S$GLB,, | Performed by: SURGERY

## 2018-11-08 PROCEDURE — 99999 PR PBB SHADOW E&M-EST. PATIENT-LVL III: CPT | Mod: PBBFAC,HCNC,, | Performed by: SURGERY

## 2018-11-08 NOTE — PATIENT INSTRUCTIONS
The abscess site is healing well.    It is okay to shower and get the area wet.    We will see you back in about 5 weeks to make sure the wound is healing well.    If you notice any increased swelling redness or drainage please let us know

## 2018-11-08 NOTE — PROGRESS NOTES
Subjective:       Patient ID: Desirae No is a 68 y.o. female.    Right axillary abscess    Chief Complaint: Post-op Evaluation    Patient had a large right axillary abscess.  She was treated with incision and drainage.  She has completed her antibiotics.  She states that the wound is getting smaller with the daily dressing changes with home health      Review of Systems   Skin:        Right axillary wound is improved       Objective:      Physical Exam   Constitutional: No distress.   Overweight   Skin:   The right axillary abscess site is clean.  It is approximately 1.5 x 1 x 1 cm.  There is good granulation tissue   Vitals reviewed.      Assessment:      right axillary abscess site is healing well  Plan:       Continue local wound care.  Follow up in 4-6 weeks    Home health should continue into the wound is closed hernia closed

## 2018-11-19 ENCOUNTER — TELEPHONE (OUTPATIENT)
Dept: ADMINISTRATIVE | Facility: CLINIC | Age: 68
End: 2018-11-19

## 2018-11-19 NOTE — TELEPHONE ENCOUNTER
Home Health Recert 10/09/2018 to 12/07/2018 with [facility] , Dr Miguel Anderson.  service.

## 2018-12-20 ENCOUNTER — TELEPHONE (OUTPATIENT)
Dept: ADMINISTRATIVE | Facility: CLINIC | Age: 68
End: 2018-12-20

## 2019-01-23 ENCOUNTER — OFFICE VISIT (OUTPATIENT)
Dept: PODIATRY | Facility: CLINIC | Age: 69
End: 2019-01-23
Payer: MEDICARE

## 2019-01-23 VITALS
BODY MASS INDEX: 34.73 KG/M2 | WEIGHT: 228.38 LBS | HEART RATE: 88 BPM | SYSTOLIC BLOOD PRESSURE: 136 MMHG | DIASTOLIC BLOOD PRESSURE: 90 MMHG

## 2019-01-23 DIAGNOSIS — Q82.8 POROKERATOSIS: ICD-10-CM

## 2019-01-23 DIAGNOSIS — L90.9 PLANTAR FAT PAD ATROPHY: Primary | ICD-10-CM

## 2019-01-23 PROCEDURE — 3080F PR MOST RECENT DIASTOLIC BLOOD PRESSURE >= 90 MM HG: ICD-10-PCS | Mod: HCNC,CPTII,S$GLB, | Performed by: PODIATRIST

## 2019-01-23 PROCEDURE — 3075F SYST BP GE 130 - 139MM HG: CPT | Mod: HCNC,CPTII,S$GLB, | Performed by: PODIATRIST

## 2019-01-23 PROCEDURE — 1101F PT FALLS ASSESS-DOCD LE1/YR: CPT | Mod: HCNC,CPTII,S$GLB, | Performed by: PODIATRIST

## 2019-01-23 PROCEDURE — 3080F DIAST BP >= 90 MM HG: CPT | Mod: HCNC,CPTII,S$GLB, | Performed by: PODIATRIST

## 2019-01-23 PROCEDURE — 99203 OFFICE O/P NEW LOW 30 MIN: CPT | Mod: HCNC,S$GLB,, | Performed by: PODIATRIST

## 2019-01-23 PROCEDURE — 1101F PR PT FALLS ASSESS DOC 0-1 FALLS W/OUT INJ PAST YR: ICD-10-PCS | Mod: HCNC,CPTII,S$GLB, | Performed by: PODIATRIST

## 2019-01-23 PROCEDURE — 99999 PR PBB SHADOW E&M-EST. PATIENT-LVL II: ICD-10-PCS | Mod: PBBFAC,HCNC,, | Performed by: PODIATRIST

## 2019-01-23 PROCEDURE — 99203 PR OFFICE/OUTPT VISIT, NEW, LEVL III, 30-44 MIN: ICD-10-PCS | Mod: HCNC,S$GLB,, | Performed by: PODIATRIST

## 2019-01-23 PROCEDURE — 3075F PR MOST RECENT SYSTOLIC BLOOD PRESS GE 130-139MM HG: ICD-10-PCS | Mod: HCNC,CPTII,S$GLB, | Performed by: PODIATRIST

## 2019-01-23 PROCEDURE — 99999 PR PBB SHADOW E&M-EST. PATIENT-LVL II: CPT | Mod: PBBFAC,HCNC,, | Performed by: PODIATRIST

## 2019-02-04 ENCOUNTER — TELEPHONE (OUTPATIENT)
Dept: FAMILY MEDICINE | Facility: CLINIC | Age: 69
End: 2019-02-04

## 2019-02-04 DIAGNOSIS — I70.0 ATHEROSCLEROSIS OF AORTA: ICD-10-CM

## 2019-02-04 RX ORDER — ATORVASTATIN CALCIUM 10 MG/1
TABLET, FILM COATED ORAL
Qty: 90 TABLET | Refills: 0 | Status: SHIPPED | OUTPATIENT
Start: 2019-02-04 | End: 2019-06-05 | Stop reason: SDUPTHER

## 2019-02-04 NOTE — PROGRESS NOTES
Subjective:     Patient ID: Desirae No is a 68 y.o. female.    Chief Complaint: Callouses (left toe callouses, pt is not diabetic, pt states 4 aching pain)    HPI: This 68 year old female presents today complaining of painful callus of left foot. The patient states they have had pain in left foot for several months. Patient denies any history of trauma. When asked to indicate where the pain is located, patient points to ball of left foot. Patient states her  usually flies the callus for her but it has become to painful and feels like there is a pebble inside their foot when they walk.  Patient denies other complaints at this time.        Patient Active Problem List   Diagnosis    History of pulmonary embolism    DDD (degenerative disc disease), lumbar    DDD (degenerative disc disease), cervical    Osteoarthritis    Neurogenic bladder    Suprapubic catheter    Atherosclerosis of aorta    Elevated troponin    Acute encephalopathy    Hypokalemia    Non-intractable vomiting with nausea       Medication List with Changes/Refills   Current Medications    ASPIRIN (ECOTRIN) 81 MG EC TABLET    Take 1 tablet (81 mg total) by mouth once daily.    ATORVASTATIN (LIPITOR) 10 MG TABLET    Take 1 tablet (10 mg total) by mouth every evening.    CLONAZEPAM (KLONOPIN) 2 MG TAB    Take 2 mg by mouth 2 (two) times daily. Pt states she takes two tabs at night.    MUPIROCIN (BACTROBAN) 2 % OINTMENT    Apply topically 3 (three) times daily.    OXYBUTYNIN (DITROPAN) 5 MG TAB    Take 5 mg by mouth 2 (two) times daily.     OXYCODONE (ROXICODONE) 30 MG TAB    Take 30 mg by mouth every 6 (six) hours as needed.    POLYETHYLENE GLYCOL (GLYCOLAX) 17 GRAM PWPK    Take 17 g by mouth.    PROMETHAZINE (PHENERGAN) 25 MG TABLET    Take 1 tablet (25 mg total) by mouth every 6 (six) hours as needed for Nausea.    QUETIAPINE (SEROQUEL) 400 MG TABLET    Take 400 mg by mouth every evening.    TIZANIDINE (ZANAFLEX) 4 MG TABLET    Take  by mouth once daily.     XTAMPZA ER 27 MG CSPT    TAKE TWO CAPSULES BY MOUTH EVERY TWELVE HOURS WITH FOOD       Review of patient's allergies indicates:   Allergen Reactions    Nucynta [tapentadol] Nausea And Vomiting    Prochlorperazine Nausea And Vomiting    Stadol [butorphanol tartrate] Nausea And Vomiting    Metoclopramide     Morphine (pf)      Pt states she is not allergic to morphine    Reglan [metoclopramide hcl]     Toradol [ketorolac]     Zofran [ondansetron hcl (pf)]        Past Surgical History:   Procedure Laterality Date    CERVICAL FUSION      CHOLECYSTECTOMY      HYSTERECTOMY  1974    menorrhagia    INCISION AND DRAINAGE, ABSCESS Right 10/16/2018    Per[facility]    JOINT REPLACEMENT      neck fusion         Family History   Problem Relation Age of Onset    Stroke Father        Social History     Socioeconomic History    Marital status:      Spouse name: Not on file    Number of children: Not on file    Years of education: Not on file    Highest education level: Not on file   Social Needs    Financial resource strain: Not on file    Food insecurity - worry: Not on file    Food insecurity - inability: Not on file    Transportation needs - medical: Not on file    Transportation needs - non-medical: Not on file   Occupational History    Not on file   Tobacco Use    Smoking status: Never Smoker    Smokeless tobacco: Never Used   Substance and Sexual Activity    Alcohol use: No    Drug use: No    Sexual activity: Yes     Partners: Male     Birth control/protection: None, Surgical   Other Topics Concern    Not on file   Social History Narrative    Not on file       Vitals:    01/23/19 1617   BP: (!) 136/90   Pulse: 88   Weight: 103.6 kg (228 lb 6.3 oz)   PainSc:   4   PainLoc: Foot       Review of Systems   Constitutional: Negative for chills and fever.   Respiratory: Negative for shortness of breath.    Cardiovascular: Negative for  chest pain, palpitations, orthopnea, claudication and leg swelling.   Gastrointestinal: Negative for diarrhea, nausea and vomiting.   Musculoskeletal: Negative for joint pain.   Skin: Negative for rash.   Neurological: Negative for dizziness, tingling, sensory change, focal weakness and weakness.   Psychiatric/Behavioral: Negative.            Objective:      Protective Sensation (w/ 10 gram monofilament):  PHYSICAL EXAM: Apperance: Alert and orient in no distress,well developed, and with good attention to grooming and body habits  Lower Extremity Physical Exam:  VASCULAR: Dorsalis pedis pulses 2/4 bilateral and Posterior Tibial pulses 2/4 bilateral. Capillary fill time <3 seconds bilateral. Mild edema observed bilateral. Varicosities absent bilateral. Skin temperature of the lower extremities is warm to warm, proximal to distal. Hair growth WNL bilateral.  DERMATOLOGICAL: No skin rashes, subcutaneous nodules, or ulcers observed bilateral. Mild lesions noted to left plantar submetatarsal  with thickened center core. Webspaces 1,2,3,4 clean, dry and without evidence of break in skin integrity bilateral.   NEUROLOGICAL: Light touch, sharp-dull, proprioception all present and equal bilaterally.   MUSCULOSKELETAL: Muscle strength is 5/5 for foot inverters, everters, plantarflexors, and dorsiflexors. Muscle tone is normal. Positive pain on palpation of hyperkeratotic lesion left foot. Fat pad atrophy noted bilateral.           Assessment:       Encounter Diagnoses   Name Primary?    Plantar fat pad atrophy Yes    Porokeratosis - Left Foot          Plan:   Plantar fat pad atrophy    Porokeratosis - Left Foot      I counseled the patient on her conditions, regarding findings of my examination, my impressions, and usual treatment plan.   With patient's permission, sharp excisional debridement and chemical destruction of soft tissue lesion deep to epidermal layer x 1 left foot was performed with #15 blade. Patient relates  relief following the procedure. Silver Nitrate applied to affected area of the noted lesions and then salinocaine applied. Duct tape applied for occlusion and then an offloading donut pad. Pt instructed to keep adhesive intact for 24 hours. Do not get wet. After 24 hours,  may remove.   Patient instructed to regularly file area to prevent build up. Patient also instructed to keep area moisturized.   Offloading pad applied to shoe inserts.   Patient to return as needed.                  Noemi Kelley DPM  [facility]

## 2019-02-14 ENCOUNTER — TELEPHONE (OUTPATIENT)
Dept: ADMINISTRATIVE | Facility: CLINIC | Age: 69
End: 2019-02-14

## 2019-04-16 ENCOUNTER — HOSPITAL ENCOUNTER (OUTPATIENT)
Dept: RADIOLOGY | Facility: HOSPITAL | Age: 69
Discharge: HOME | End: 2019-04-16
Attending: FAMILY MEDICINE
Payer: MEDICARE

## 2019-04-16 ENCOUNTER — OFFICE VISIT (OUTPATIENT)
Dept: FAMILY MEDICINE | Facility: CLINIC | Age: 69
End: 2019-04-16
Payer: MEDICARE

## 2019-04-16 VITALS
HEART RATE: 100 BPM | SYSTOLIC BLOOD PRESSURE: 136 MMHG | TEMPERATURE: 99 F | DIASTOLIC BLOOD PRESSURE: 88 MMHG | HEIGHT: 68 IN | RESPIRATION RATE: 16 BRPM | WEIGHT: 241.31 LBS | BODY MASS INDEX: 36.57 KG/M2 | OXYGEN SATURATION: 95 %

## 2019-04-16 DIAGNOSIS — Z93.59 SUPRAPUBIC CATHETER: ICD-10-CM

## 2019-04-16 DIAGNOSIS — E66.9 OBESITY (BMI 30-39.9): ICD-10-CM

## 2019-04-16 DIAGNOSIS — R50.9 FEVER, UNSPECIFIED FEVER CAUSE: ICD-10-CM

## 2019-04-16 DIAGNOSIS — R05.9 COUGH: ICD-10-CM

## 2019-04-16 DIAGNOSIS — I70.0 ATHEROSCLEROSIS OF AORTA: ICD-10-CM

## 2019-04-16 DIAGNOSIS — J10.1 INFLUENZA A: Primary | ICD-10-CM

## 2019-04-16 LAB
CTP QC/QA: YES
CTP QC/QA: YES
FLUAV AG NPH QL: POSITIVE
FLUBV AG NPH QL: NEGATIVE
S PYO RRNA THROAT QL PROBE: NEGATIVE

## 2019-04-16 PROCEDURE — 3075F PR MOST RECENT SYSTOLIC BLOOD PRESS GE 130-139MM HG: ICD-10-PCS | Mod: HCNC,CPTII,S$GLB, | Performed by: FAMILY MEDICINE

## 2019-04-16 PROCEDURE — 94640 PR INHAL RX, AIRWAY OBST/DX SPUTUM INDUCT: ICD-10-PCS | Mod: HCNC,S$GLB,, | Performed by: FAMILY MEDICINE

## 2019-04-16 PROCEDURE — 99214 PR OFFICE/OUTPT VISIT, EST, LEVL IV, 30-39 MIN: ICD-10-PCS | Mod: HCNC,25,S$GLB, | Performed by: FAMILY MEDICINE

## 2019-04-16 PROCEDURE — 87804 POCT INFLUENZA A/B: ICD-10-PCS | Mod: QW,HCNC,S$GLB, | Performed by: FAMILY MEDICINE

## 2019-04-16 PROCEDURE — 99999 PR PBB SHADOW E&M-EST. PATIENT-LVL V: ICD-10-PCS | Mod: PBBFAC,HCNC,, | Performed by: FAMILY MEDICINE

## 2019-04-16 PROCEDURE — 87880 STREP A ASSAY W/OPTIC: CPT | Mod: QW,HCNC,S$GLB, | Performed by: FAMILY MEDICINE

## 2019-04-16 PROCEDURE — 1101F PR PT FALLS ASSESS DOC 0-1 FALLS W/OUT INJ PAST YR: ICD-10-PCS | Mod: HCNC,CPTII,S$GLB, | Performed by: FAMILY MEDICINE

## 2019-04-16 PROCEDURE — 94640 AIRWAY INHALATION TREATMENT: CPT | Mod: HCNC,S$GLB,, | Performed by: FAMILY MEDICINE

## 2019-04-16 PROCEDURE — 3075F SYST BP GE 130 - 139MM HG: CPT | Mod: HCNC,CPTII,S$GLB, | Performed by: FAMILY MEDICINE

## 2019-04-16 PROCEDURE — 71046 X-RAY EXAM CHEST 2 VIEWS: CPT | Mod: 26,HCNC,, | Performed by: RADIOLOGY

## 2019-04-16 PROCEDURE — 87804 INFLUENZA ASSAY W/OPTIC: CPT | Mod: QW,HCNC,S$GLB, | Performed by: FAMILY MEDICINE

## 2019-04-16 PROCEDURE — 87880 POCT RAPID STREP A: ICD-10-PCS | Mod: QW,HCNC,S$GLB, | Performed by: FAMILY MEDICINE

## 2019-04-16 PROCEDURE — 71046 XR CHEST PA AND LATERAL: ICD-10-PCS | Mod: 26,HCNC,, | Performed by: RADIOLOGY

## 2019-04-16 PROCEDURE — 99214 OFFICE O/P EST MOD 30 MIN: CPT | Mod: HCNC,25,S$GLB, | Performed by: FAMILY MEDICINE

## 2019-04-16 PROCEDURE — 3079F DIAST BP 80-89 MM HG: CPT | Mod: HCNC,CPTII,S$GLB, | Performed by: FAMILY MEDICINE

## 2019-04-16 PROCEDURE — 71046 X-RAY EXAM CHEST 2 VIEWS: CPT | Mod: TC,HCNC,PO

## 2019-04-16 PROCEDURE — 3079F PR MOST RECENT DIASTOLIC BLOOD PRESSURE 80-89 MM HG: ICD-10-PCS | Mod: HCNC,CPTII,S$GLB, | Performed by: FAMILY MEDICINE

## 2019-04-16 PROCEDURE — 1101F PT FALLS ASSESS-DOCD LE1/YR: CPT | Mod: HCNC,CPTII,S$GLB, | Performed by: FAMILY MEDICINE

## 2019-04-16 PROCEDURE — 99999 PR PBB SHADOW E&M-EST. PATIENT-LVL V: CPT | Mod: PBBFAC,HCNC,, | Performed by: FAMILY MEDICINE

## 2019-04-16 RX ORDER — ALBUTEROL SULFATE 0.83 MG/ML
2.5 SOLUTION RESPIRATORY (INHALATION)
Status: COMPLETED | OUTPATIENT
Start: 2019-04-16 | End: 2019-04-16

## 2019-04-16 RX ORDER — BENZONATATE 100 MG/1
100 CAPSULE ORAL 3 TIMES DAILY PRN
Qty: 30 CAPSULE | Refills: 0 | Status: SHIPPED | OUTPATIENT
Start: 2019-04-16 | End: 2019-04-26

## 2019-04-16 RX ORDER — ALBUTEROL SULFATE 90 UG/1
2 AEROSOL, METERED RESPIRATORY (INHALATION) EVERY 6 HOURS PRN
Qty: 18 G | Refills: 0 | Status: SHIPPED | OUTPATIENT
Start: 2019-04-16 | End: 2023-09-28

## 2019-04-16 RX ORDER — OSELTAMIVIR PHOSPHATE 75 MG/1
75 CAPSULE ORAL 2 TIMES DAILY
Qty: 10 CAPSULE | Refills: 0 | Status: SHIPPED | OUTPATIENT
Start: 2019-04-16 | End: 2019-04-21

## 2019-04-16 RX ADMIN — ALBUTEROL SULFATE 2.5 MG: 0.83 SOLUTION RESPIRATORY (INHALATION) at 02:04

## 2019-04-16 NOTE — PROGRESS NOTES
"Subjective:       Patient ID: Desirae No is a 68 y.o. female.    Chief Complaint: Fever and cough    HPI   Fever and cough  68y female presents today with report of fever and accompanying cough. Symptoms began suddenly yesterday and she notes Tmax of 101.8. Cough has been productive of clear sputum. She has been taking Robitussin with some relief. Spouse was reportedly evaluated in the ER two days ago and found to be positive for flu. Patient has not had seasonal flu vaccine. She denies any shortness of breath. She does feel weak and describes diffuse body aches. She has suprapubic catheter in place- no urinary concerns are noted. She will be transitioning care to Dr. Hinkle and has a pending evaluation scheduled.    Review of Systems   Constitutional: Positive for fatigue and fever. Negative for chills.   HENT: Negative for congestion, ear pain and sinus pressure.    Eyes: Negative for visual disturbance.   Respiratory: Positive for cough. Negative for chest tightness, shortness of breath and wheezing.    Cardiovascular: Negative for chest pain and palpitations.   Gastrointestinal: Negative for abdominal pain, constipation, diarrhea and nausea.   Genitourinary: Negative for difficulty urinating and urgency.   Musculoskeletal: Positive for arthralgias. Negative for myalgias.   Skin: Negative for rash.   Neurological: Negative for dizziness, weakness and headaches.   Psychiatric/Behavioral: Negative for sleep disturbance. The patient is not nervous/anxious.        Objective:   /88   Pulse 100   Temp 99 °F (37.2 °C) (Temporal)   Resp 16   Ht 5' 8" (1.727 m)   Wt 109.5 kg (241 lb 4.7 oz)   LMP 04/24/1974   SpO2 95%   BMI 36.69 kg/m²   Physical Exam   Constitutional: She is oriented to person, place, and time. She appears well-developed and well-nourished.   Obese, non-toxic   HENT:   Head: Normocephalic and atraumatic.   Right Ear: Tympanic membrane, external ear and ear canal normal.   Left Ear: " Tympanic membrane, external ear and ear canal normal.   Nose: Rhinorrhea present.   Mouth/Throat: Oropharynx is clear and moist.   Eyes: Pupils are equal, round, and reactive to light. Conjunctivae and EOM are normal.   Neck: Normal range of motion. Neck supple.   Cardiovascular: Normal rate, regular rhythm and normal heart sounds.   Pulmonary/Chest: Effort normal. She has decreased breath sounds in the right lower field and the left lower field.   Harsh, non-productive cough   Abdominal: Soft. Bowel sounds are normal.   Musculoskeletal: She exhibits no edema.   Neurological: She is alert and oriented to person, place, and time.   Skin: Skin is warm and dry.   Psychiatric: She has a normal mood and affect. Her behavior is normal.       Assessment:       1. Influenza A    2. Cough    3. Fever, unspecified fever cause    4. Atherosclerosis of aorta    5. Suprapubic catheter    6. Obesity (BMI 30-39.9)        Plan:      Influenza A  -     oseltamivir (TAMIFLU) 75 MG capsule; Take 1 capsule (75 mg total) by mouth 2 (two) times daily. for 5 days  Dispense: 10 capsule; Refill: 0  -     benzonatate (TESSALON) 100 MG capsule; Take 1 capsule (100 mg total) by mouth 3 (three) times daily as needed for Cough.  Dispense: 30 capsule; Refill: 0  -     albuterol (VENTOLIN HFA) 90 mcg/actuation inhaler; Inhale 2 puffs into the lungs every 6 (six) hours as needed for Wheezing. Rescue  Dispense: 18 g; Refill: 0    Cough  CXR without acute infiltrate. Albuterol nebs provided today with favorable response. Will provide RX for home use. Continue with Robitussin. Reviewed w/s and reasons to seek reassessment should these symptoms occur.  -     X-Ray Chest PA And Lateral; Future; Expected date: 04/16/2019  -     albuterol nebulizer solution 2.5 mg  -     albuterol (VENTOLIN HFA) 90 mcg/actuation inhaler; Inhale 2 puffs into the lungs every 6 (six) hours as needed for Wheezing. Rescue  Dispense: 18 g; Refill: 0    Fever, unspecified fever  cause  Negative RST. Positive flu as above.  -     POCT Influenza A/B  -     POCT Rapid Strep A    Atherosclerosis of aorta  BP and lipid control remain advised.    Suprapubic catheter  As per Urology.    Obesity (BMI 30-39.9)  Weight loss efforts remain encouraged.

## 2019-06-05 ENCOUNTER — OFFICE VISIT (OUTPATIENT)
Dept: FAMILY MEDICINE | Facility: CLINIC | Age: 69
End: 2019-06-05
Payer: MEDICARE

## 2019-06-05 VITALS
HEART RATE: 110 BPM | SYSTOLIC BLOOD PRESSURE: 130 MMHG | HEIGHT: 68 IN | WEIGHT: 234.56 LBS | OXYGEN SATURATION: 96 % | TEMPERATURE: 97 F | BODY MASS INDEX: 35.55 KG/M2 | DIASTOLIC BLOOD PRESSURE: 88 MMHG | RESPIRATION RATE: 16 BRPM

## 2019-06-05 DIAGNOSIS — L02.419 AXILLARY ABSCESS: Primary | ICD-10-CM

## 2019-06-05 DIAGNOSIS — E66.9 OBESITY (BMI 30-39.9): ICD-10-CM

## 2019-06-05 DIAGNOSIS — K59.09 CHRONIC CONSTIPATION: ICD-10-CM

## 2019-06-05 DIAGNOSIS — Z93.59 SUPRAPUBIC CATHETER: ICD-10-CM

## 2019-06-05 DIAGNOSIS — I70.0 ATHEROSCLEROSIS OF AORTA: ICD-10-CM

## 2019-06-05 PROCEDURE — 3079F PR MOST RECENT DIASTOLIC BLOOD PRESSURE 80-89 MM HG: ICD-10-PCS | Mod: HCNC,CPTII,S$GLB, | Performed by: FAMILY MEDICINE

## 2019-06-05 PROCEDURE — 99214 PR OFFICE/OUTPT VISIT, EST, LEVL IV, 30-39 MIN: ICD-10-PCS | Mod: HCNC,S$GLB,, | Performed by: FAMILY MEDICINE

## 2019-06-05 PROCEDURE — 1101F PT FALLS ASSESS-DOCD LE1/YR: CPT | Mod: HCNC,CPTII,S$GLB, | Performed by: FAMILY MEDICINE

## 2019-06-05 PROCEDURE — 99499 UNLISTED E&M SERVICE: CPT | Mod: S$GLB,,, | Performed by: FAMILY MEDICINE

## 2019-06-05 PROCEDURE — 1101F PR PT FALLS ASSESS DOC 0-1 FALLS W/OUT INJ PAST YR: ICD-10-PCS | Mod: HCNC,CPTII,S$GLB, | Performed by: FAMILY MEDICINE

## 2019-06-05 PROCEDURE — 99214 OFFICE O/P EST MOD 30 MIN: CPT | Mod: HCNC,S$GLB,, | Performed by: FAMILY MEDICINE

## 2019-06-05 PROCEDURE — 3079F DIAST BP 80-89 MM HG: CPT | Mod: HCNC,CPTII,S$GLB, | Performed by: FAMILY MEDICINE

## 2019-06-05 PROCEDURE — 3075F PR MOST RECENT SYSTOLIC BLOOD PRESS GE 130-139MM HG: ICD-10-PCS | Mod: HCNC,CPTII,S$GLB, | Performed by: FAMILY MEDICINE

## 2019-06-05 PROCEDURE — 99499 RISK ADDL DX/OHS AUDIT: ICD-10-PCS | Mod: S$GLB,,, | Performed by: FAMILY MEDICINE

## 2019-06-05 PROCEDURE — 3075F SYST BP GE 130 - 139MM HG: CPT | Mod: HCNC,CPTII,S$GLB, | Performed by: FAMILY MEDICINE

## 2019-06-05 PROCEDURE — 99999 PR PBB SHADOW E&M-EST. PATIENT-LVL III: ICD-10-PCS | Mod: PBBFAC,HCNC,, | Performed by: FAMILY MEDICINE

## 2019-06-05 PROCEDURE — 99999 PR PBB SHADOW E&M-EST. PATIENT-LVL III: CPT | Mod: PBBFAC,HCNC,, | Performed by: FAMILY MEDICINE

## 2019-06-05 RX ORDER — SULFAMETHOXAZOLE AND TRIMETHOPRIM 800; 160 MG/1; MG/1
1 TABLET ORAL 2 TIMES DAILY
Qty: 14 TABLET | Refills: 0 | Status: SHIPPED | OUTPATIENT
Start: 2019-06-05 | End: 2019-10-09 | Stop reason: ALTCHOICE

## 2019-06-05 RX ORDER — ATORVASTATIN CALCIUM 10 MG/1
10 TABLET, FILM COATED ORAL NIGHTLY
Qty: 30 TABLET | Refills: 0 | Status: SHIPPED | OUTPATIENT
Start: 2019-06-05 | End: 2019-06-27 | Stop reason: SDUPTHER

## 2019-06-05 NOTE — PROGRESS NOTES
"Subjective:       Patient ID: Desirae No is a 68 y.o. female.    Chief Complaint: Multiple concerns    HPI   Multiple concerns  69yo female presents today with multiple concerns. She reports having small knots to both axillary regions present off and on for several months. She underwent incision and drainage last year per Gen Surg and is concerned that this may need to be repeated. She reports that symptoms are currently worse on the left side. She has not applied any topical measures. She has been afebrile. She also notes having some issues with diarrhea over the past week. She had significant abdominal pain with occurrence as well but this has been improving. She has not had any nausea or vomiting but appetite has been decreased from baseline. States "used to I would get the diarrhea and the vomiting and then end up in the hospital". Stool is currently back to baseline. She has been out of Lipitor for "months". Last lipid assessment was obtained in June 2019 with recommendation for repeat lab monitoring in October 2018- she did not obtain labs as directed. Health maintenance update is needed. There have been no recent ER visits or hospitalizations. Of note, she plans to see Dr. Hinkle (Urology) for continued care since Dr. Le has left- she has not yet established care formally. Suprapubic cath remains in place.    Review of Systems   Constitutional: Positive for fatigue. Negative for chills and fever.   HENT: Negative for congestion, ear pain, postnasal drip and sore throat.    Eyes: Negative for visual disturbance.   Respiratory: Negative for cough and shortness of breath.    Cardiovascular: Negative for chest pain and palpitations.   Gastrointestinal: Negative for abdominal pain, constipation, diarrhea, nausea and vomiting.   Genitourinary: Negative for decreased urine volume and difficulty urinating.   Musculoskeletal: Positive for back pain. Negative for arthralgias and myalgias.   Skin: Negative for " "rash.        +"knots"   Neurological: Negative for dizziness, weakness and headaches.   Psychiatric/Behavioral: Negative for dysphoric mood and sleep disturbance. The patient is not nervous/anxious.        Objective:   /88   Pulse 110   Temp 97.3 °F (36.3 °C) (Temporal)   Resp 16   Ht 5' 8" (1.727 m)   Wt 106.4 kg (234 lb 9.1 oz)   LMP 04/24/1974   SpO2 96%   BMI 35.67 kg/m²   Physical Exam   Constitutional: She is oriented to person, place, and time. She appears well-developed and well-nourished. No distress.   HENT:   Head: Normocephalic and atraumatic.   Right Ear: External ear normal.   Left Ear: External ear normal.   Nose: Nose normal.   Mouth/Throat: Oropharynx is clear and moist.   Eyes: Pupils are equal, round, and reactive to light. Conjunctivae and EOM are normal.   Neck: Normal range of motion. Neck supple.   Cardiovascular: Normal rate, regular rhythm and normal heart sounds.   Pulmonary/Chest: Effort normal and breath sounds normal.   Abdominal: Soft. Bowel sounds are normal.   Musculoskeletal: She exhibits no edema.   Neurological: She is alert and oriented to person, place, and time.   Skin: Skin is warm and dry. She is not diaphoretic.   Left axillary abscess without fluctuance or manually expressible drainage   Psychiatric: She has a normal mood and affect.       Assessment:       1. Axillary abscess    2. Atherosclerosis of aorta    3. Chronic constipation    4. Obesity (BMI 30-39.9)    5. Suprapubic catheter        Plan:      Axillary abscess  Discussed warm compress use. Recommend she refrain from shaving and deodorant application. Will initiate Bactrim and have patient see Gen Surg for further assessment.   -     sulfamethoxazole-trimethoprim 800-160mg (BACTRIM DS) 800-160 mg Tab; Take 1 tablet by mouth 2 (two) times daily.  Dispense: 14 tablet; Refill: 0    Atherosclerosis of aorta   Resume statin therapy and return for updated lipid assessment. Heart healthy diet is also " advised.  -     Lipid panel; Future; Expected date: 06/05/2019  -     Comprehensive metabolic panel; Future; Expected date: 06/05/2019  -     atorvastatin (LIPITOR) 10 MG tablet; Take 1 tablet (10 mg total) by mouth every evening.  Dispense: 30 tablet; Refill: 0    Chronic constipation  Bowel habits are now back to baseline. Recommend resuming use of Miralax routinely. Uncertain etiology of her complaint last week. As she is asymptomatic at present we will not proceed with additional workup at this time. Hydration efforts as well as dietary fiber intake is recommended.     Obesity (BMI 30-39.9)  Weight loss efforts are encouraged through diet and lifestyle measures.    Suprapubic catheter  Recommend establishing with new Urologist while she is at baseline and without any current urinary symptoms- she is aware that I do not manage suprapubic catheter use.    Patient's previously scheduled MMG and DEXA have been rescheduled.  Awaiting labs for additional follow-up recommendations.

## 2019-06-06 PROCEDURE — G0179 PR HOME HEALTH MD RECERTIFICATION: ICD-10-PCS | Mod: ,,, | Performed by: SURGERY

## 2019-06-06 PROCEDURE — G0179 MD RECERTIFICATION HHA PT: HCPCS | Mod: ,,, | Performed by: SURGERY

## 2019-06-07 ENCOUNTER — OFFICE VISIT (OUTPATIENT)
Dept: SURGERY | Facility: CLINIC | Age: 69
End: 2019-06-07
Payer: MEDICARE

## 2019-06-07 VITALS
TEMPERATURE: 98 F | SYSTOLIC BLOOD PRESSURE: 127 MMHG | HEIGHT: 68 IN | BODY MASS INDEX: 35.65 KG/M2 | HEART RATE: 87 BPM | WEIGHT: 235.25 LBS | DIASTOLIC BLOOD PRESSURE: 82 MMHG

## 2019-06-07 DIAGNOSIS — L02.91 ABSCESS: Primary | ICD-10-CM

## 2019-06-07 PROCEDURE — 3074F PR MOST RECENT SYSTOLIC BLOOD PRESSURE < 130 MM HG: ICD-10-PCS | Mod: HCNC,CPTII,S$GLB, | Performed by: PHYSICIAN ASSISTANT

## 2019-06-07 PROCEDURE — 3079F DIAST BP 80-89 MM HG: CPT | Mod: HCNC,CPTII,S$GLB, | Performed by: PHYSICIAN ASSISTANT

## 2019-06-07 PROCEDURE — 99999 PR PBB SHADOW E&M-EST. PATIENT-LVL IV: ICD-10-PCS | Mod: PBBFAC,HCNC,, | Performed by: PHYSICIAN ASSISTANT

## 2019-06-07 PROCEDURE — 99999 PR PBB SHADOW E&M-EST. PATIENT-LVL IV: CPT | Mod: PBBFAC,HCNC,, | Performed by: PHYSICIAN ASSISTANT

## 2019-06-07 PROCEDURE — 10060 I&D ABSCESS SIMPLE/SINGLE: CPT | Mod: HCNC,S$GLB,, | Performed by: PHYSICIAN ASSISTANT

## 2019-06-07 PROCEDURE — 10060 PR DRAIN SKIN ABSCESS SIMPLE: ICD-10-PCS | Mod: HCNC,S$GLB,, | Performed by: PHYSICIAN ASSISTANT

## 2019-06-07 PROCEDURE — 3074F SYST BP LT 130 MM HG: CPT | Mod: HCNC,CPTII,S$GLB, | Performed by: PHYSICIAN ASSISTANT

## 2019-06-07 PROCEDURE — 3079F PR MOST RECENT DIASTOLIC BLOOD PRESSURE 80-89 MM HG: ICD-10-PCS | Mod: HCNC,CPTII,S$GLB, | Performed by: PHYSICIAN ASSISTANT

## 2019-06-07 NOTE — PROCEDURES
"Incision & Drainage  Date/Time: 6/7/2019 2:26 PM  Performed by: Vilma Issa PA-C  Authorized by: Vilma Issa PA-C     Time out: Immediately prior to procedure a "time out" was called to verify the correct patient, procedure, equipment, support staff and site/side marked as required.    Consent Done?:  Yes (Verbal)    Type:  Abscess  Body area:  Upper extremity  Anesthesia:  Local infiltration  Local anesthetic: lidocaine 1% with epinephrine  Incision type:  Single straight  Complexity:  Simple  Drainage:  Pus and purulent  Drainage amount:  Moderate  Wound treatment:  Wound packed  Patient tolerance:  Patient tolerated the procedure well with no immediate complications      "

## 2019-06-07 NOTE — PROGRESS NOTES
Subjective:       Patient ID: Desirae No is a 68 y.o. female.    Chief Complaint: No chief complaint on file.    Desirae No is a 68 y.o. Female who presents with an abscess of her left axilla. She had a similar problem several months ago on her right axilla which required hospitalization and I&D in the OR. She reports the problem in her left started recently and waxes and wanes. She denies drainage, fever, or chills.     Review of Systems   Constitutional: Negative for activity change, chills and fever.   Respiratory: Negative for shortness of breath.    Cardiovascular: Negative for chest pain.   Gastrointestinal: Negative for abdominal pain, nausea and vomiting.   Genitourinary: Negative for dysuria.   Musculoskeletal: Negative for myalgias.   Skin: Positive for wound.   Neurological: Positive for tremors and weakness.   Hematological: Does not bruise/bleed easily.   Psychiatric/Behavioral: The patient is not nervous/anxious.        Objective:      Physical Exam   Constitutional: She is oriented to person, place, and time. She appears well-developed and well-nourished.   HENT:   Head: Normocephalic and atraumatic.   Eyes: EOM are normal.   Cardiovascular: Normal rate and regular rhythm.   Pulmonary/Chest: No respiratory distress.   Neurological: She is alert and oriented to person, place, and time.   Skin: Skin is warm and dry.   Small 3cm abscess of left axilla with punctum, fluctuance. Right axilla well healed scar.    Psychiatric: She has a normal mood and affect. Thought content normal.   Vitals reviewed.      Assessment:     abscess of left axilla      Plan:       I&D today with local anesthetic. The procedure was discussed in detail, including risks and alternatives. The patient voices understanding and all questions were answered. The patient agrees to proceed as planned.

## 2019-06-07 NOTE — PATIENT INSTRUCTIONS
Remove the clear plastic dressing and the gauze packing tomorrow. You can wash the area with soap and water. Cover with a large bandaid or gauze. Return to clinic in 2 weeks for a recheck or sooner if you do not feel like the problem is improving.

## 2019-06-08 ENCOUNTER — NURSE TRIAGE (OUTPATIENT)
Dept: ADMINISTRATIVE | Facility: CLINIC | Age: 69
End: 2019-06-08

## 2019-06-08 PROCEDURE — G0179 PR HOME HEALTH MD RECERTIFICATION: ICD-10-PCS | Mod: ,,, | Performed by: SURGERY

## 2019-06-08 PROCEDURE — G0179 MD RECERTIFICATION HHA PT: HCPCS | Mod: ,,, | Performed by: SURGERY

## 2019-06-08 RX ORDER — PROMETHAZINE HYDROCHLORIDE 25 MG/1
25 TABLET ORAL EVERY 6 HOURS PRN
Qty: 20 TABLET | Refills: 0 | Status: SHIPPED | OUTPATIENT
Start: 2019-06-08 | End: 2023-09-26

## 2019-06-08 NOTE — TELEPHONE ENCOUNTER
Pt called with questions about nausea and nausea medication. Currently on an antibiotic and having upset stomach. Requesting a prescription for phenergan.   Fauzia Muro MD on call  MD paged.     on call and paged via  at 14:04  Prescription for phenergan given 20 tabs, see order.   Called in to pharmacy by nurse on call  Patient notified.

## 2019-06-17 ENCOUNTER — EXTERNAL HOME HEALTH (OUTPATIENT)
Dept: HOME HEALTH SERVICES | Facility: HOSPITAL | Age: 69
End: 2019-06-17
Payer: MEDICARE

## 2019-06-21 ENCOUNTER — LAB VISIT (OUTPATIENT)
Dept: LAB | Facility: HOSPITAL | Age: 69
End: 2019-06-21
Attending: FAMILY MEDICINE
Payer: MEDICARE

## 2019-06-21 DIAGNOSIS — I70.0 ATHEROSCLEROSIS OF AORTA: ICD-10-CM

## 2019-06-21 PROCEDURE — 80053 COMPREHEN METABOLIC PANEL: CPT | Mod: HCNC

## 2019-06-21 PROCEDURE — 80061 LIPID PANEL: CPT | Mod: HCNC

## 2019-06-21 PROCEDURE — 36415 COLL VENOUS BLD VENIPUNCTURE: CPT | Mod: HCNC,PO

## 2019-06-22 LAB
ALBUMIN SERPL BCP-MCNC: 3.9 G/DL (ref 3.5–5.2)
ALP SERPL-CCNC: 101 U/L (ref 55–135)
ALT SERPL W/O P-5'-P-CCNC: 15 U/L (ref 10–44)
ANION GAP SERPL CALC-SCNC: 18 MMOL/L (ref 8–16)
AST SERPL-CCNC: 21 U/L (ref 10–40)
BILIRUB SERPL-MCNC: 0.4 MG/DL (ref 0.1–1)
BUN SERPL-MCNC: 10 MG/DL (ref 8–23)
CALCIUM SERPL-MCNC: 10.3 MG/DL (ref 8.7–10.5)
CHLORIDE SERPL-SCNC: 102 MMOL/L (ref 95–110)
CHOLEST SERPL-MCNC: 191 MG/DL (ref 120–199)
CHOLEST/HDLC SERPL: 3.2 {RATIO} (ref 2–5)
CO2 SERPL-SCNC: 19 MMOL/L (ref 23–29)
CREAT SERPL-MCNC: 1.1 MG/DL (ref 0.5–1.4)
EST. GFR  (AFRICAN AMERICAN): 58.8 ML/MIN/1.73 M^2
EST. GFR  (NON AFRICAN AMERICAN): 51 ML/MIN/1.73 M^2
GLUCOSE SERPL-MCNC: 92 MG/DL (ref 70–110)
HDLC SERPL-MCNC: 59 MG/DL (ref 40–75)
HDLC SERPL: 30.9 % (ref 20–50)
LDLC SERPL CALC-MCNC: 108.4 MG/DL (ref 63–159)
NONHDLC SERPL-MCNC: 132 MG/DL
POTASSIUM SERPL-SCNC: 3.9 MMOL/L (ref 3.5–5.1)
PROT SERPL-MCNC: 8.2 G/DL (ref 6–8.4)
SODIUM SERPL-SCNC: 139 MMOL/L (ref 136–145)
TRIGL SERPL-MCNC: 118 MG/DL (ref 30–150)

## 2019-06-27 DIAGNOSIS — I70.0 ATHEROSCLEROSIS OF AORTA: ICD-10-CM

## 2019-06-27 RX ORDER — ATORVASTATIN CALCIUM 10 MG/1
10 TABLET, FILM COATED ORAL NIGHTLY
Qty: 90 TABLET | Refills: 3 | Status: SHIPPED | OUTPATIENT
Start: 2019-06-27 | End: 2020-04-23

## 2019-07-17 ENCOUNTER — PES CALL (OUTPATIENT)
Dept: ADMINISTRATIVE | Facility: CLINIC | Age: 69
End: 2019-07-17

## 2019-07-24 ENCOUNTER — HOSPITAL ENCOUNTER (OUTPATIENT)
Dept: RADIOLOGY | Facility: HOSPITAL | Age: 69
Discharge: HOME | End: 2019-07-24
Attending: FAMILY MEDICINE
Payer: MEDICARE

## 2019-07-24 DIAGNOSIS — Z12.31 VISIT FOR SCREENING MAMMOGRAM: ICD-10-CM

## 2019-07-24 PROCEDURE — 77067 SCR MAMMO BI INCL CAD: CPT | Mod: TC,HCNC

## 2019-07-24 PROCEDURE — 77063 BREAST TOMOSYNTHESIS BI: CPT | Mod: 26,HCNC,, | Performed by: RADIOLOGY

## 2019-07-24 PROCEDURE — 77067 SCR MAMMO BI INCL CAD: CPT | Mod: 26,HCNC,, | Performed by: RADIOLOGY

## 2019-07-24 PROCEDURE — 77063 MAMMO DIGITAL SCREENING BILAT WITH TOMOSYNTHESIS_CAD: ICD-10-PCS | Mod: 26,HCNC,, | Performed by: RADIOLOGY

## 2019-07-24 PROCEDURE — 77067 MAMMO DIGITAL SCREENING BILAT WITH TOMOSYNTHESIS_CAD: ICD-10-PCS | Mod: 26,HCNC,, | Performed by: RADIOLOGY

## 2019-07-29 ENCOUNTER — EXTERNAL HOME HEALTH (OUTPATIENT)
Dept: HOME HEALTH SERVICES | Facility: HOSPITAL | Age: 69
End: 2019-07-29
Payer: MEDICARE

## 2019-08-13 ENCOUNTER — EXTERNAL HOME HEALTH (OUTPATIENT)
Dept: HOME HEALTH SERVICES | Facility: HOSPITAL | Age: 69
End: 2019-08-13
Payer: MEDICARE

## 2019-08-13 PROCEDURE — G0179 MD RECERTIFICATION HHA PT: HCPCS | Mod: ,,, | Performed by: SURGERY

## 2019-08-13 PROCEDURE — G0179 PR HOME HEALTH MD RECERTIFICATION: ICD-10-PCS | Mod: ,,, | Performed by: SURGERY

## 2019-09-05 ENCOUNTER — PES CALL (OUTPATIENT)
Dept: ADMINISTRATIVE | Facility: CLINIC | Age: 69
End: 2019-09-05

## 2019-10-09 ENCOUNTER — HOSPITAL ENCOUNTER (OUTPATIENT)
Dept: RADIOLOGY | Facility: HOSPITAL | Age: 69
Discharge: HOME | End: 2019-10-09
Attending: FAMILY MEDICINE
Payer: MEDICARE

## 2019-10-09 ENCOUNTER — OFFICE VISIT (OUTPATIENT)
Dept: FAMILY MEDICINE | Facility: CLINIC | Age: 69
End: 2019-10-09
Payer: MEDICARE

## 2019-10-09 ENCOUNTER — EXTERNAL HOME HEALTH (OUTPATIENT)
Dept: HOME HEALTH SERVICES | Facility: HOSPITAL | Age: 69
End: 2019-10-09
Payer: MEDICARE

## 2019-10-09 VITALS
HEART RATE: 81 BPM | BODY MASS INDEX: 35.17 KG/M2 | WEIGHT: 232.06 LBS | HEIGHT: 68 IN | TEMPERATURE: 99 F | DIASTOLIC BLOOD PRESSURE: 70 MMHG | SYSTOLIC BLOOD PRESSURE: 110 MMHG | RESPIRATION RATE: 16 BRPM | OXYGEN SATURATION: 96 %

## 2019-10-09 DIAGNOSIS — G89.4 CHRONIC PAIN SYNDROME: ICD-10-CM

## 2019-10-09 DIAGNOSIS — Z93.59 SUPRAPUBIC CATHETER: ICD-10-CM

## 2019-10-09 DIAGNOSIS — B37.2 CANDIDAL INTERTRIGO: ICD-10-CM

## 2019-10-09 DIAGNOSIS — R05.9 COUGH: ICD-10-CM

## 2019-10-09 DIAGNOSIS — R51.9 MIXED HEADACHE: ICD-10-CM

## 2019-10-09 DIAGNOSIS — E66.9 OBESITY (BMI 30-39.9): ICD-10-CM

## 2019-10-09 DIAGNOSIS — I70.0 ATHEROSCLEROSIS OF AORTA: ICD-10-CM

## 2019-10-09 DIAGNOSIS — N39.0 COMPLICATED UTI (URINARY TRACT INFECTION): ICD-10-CM

## 2019-10-09 DIAGNOSIS — A41.9 SEPSIS, DUE TO UNSPECIFIED ORGANISM: Primary | ICD-10-CM

## 2019-10-09 PROCEDURE — 1101F PT FALLS ASSESS-DOCD LE1/YR: CPT | Mod: HCNC,CPTII,S$GLB, | Performed by: FAMILY MEDICINE

## 2019-10-09 PROCEDURE — 71046 X-RAY EXAM CHEST 2 VIEWS: CPT | Mod: 26,HCNC,, | Performed by: RADIOLOGY

## 2019-10-09 PROCEDURE — 71046 X-RAY EXAM CHEST 2 VIEWS: CPT | Mod: TC,HCNC,PO

## 2019-10-09 PROCEDURE — 90662 FLU VACCINE - HIGH DOSE (65+) PRESERVATIVE FREE IM: ICD-10-PCS | Mod: HCNC,S$GLB,, | Performed by: FAMILY MEDICINE

## 2019-10-09 PROCEDURE — 99999 PR PBB SHADOW E&M-EST. PATIENT-LVL V: CPT | Mod: PBBFAC,HCNC,, | Performed by: FAMILY MEDICINE

## 2019-10-09 PROCEDURE — 3078F DIAST BP <80 MM HG: CPT | Mod: HCNC,CPTII,S$GLB, | Performed by: FAMILY MEDICINE

## 2019-10-09 PROCEDURE — 3074F PR MOST RECENT SYSTOLIC BLOOD PRESSURE < 130 MM HG: ICD-10-PCS | Mod: HCNC,CPTII,S$GLB, | Performed by: FAMILY MEDICINE

## 2019-10-09 PROCEDURE — 90662 IIV NO PRSV INCREASED AG IM: CPT | Mod: HCNC,S$GLB,, | Performed by: FAMILY MEDICINE

## 2019-10-09 PROCEDURE — 3078F PR MOST RECENT DIASTOLIC BLOOD PRESSURE < 80 MM HG: ICD-10-PCS | Mod: HCNC,CPTII,S$GLB, | Performed by: FAMILY MEDICINE

## 2019-10-09 PROCEDURE — G0008 ADMIN INFLUENZA VIRUS VAC: HCPCS | Mod: HCNC,S$GLB,, | Performed by: FAMILY MEDICINE

## 2019-10-09 PROCEDURE — 99214 PR OFFICE/OUTPT VISIT, EST, LEVL IV, 30-39 MIN: ICD-10-PCS | Mod: 25,HCNC,S$GLB, | Performed by: FAMILY MEDICINE

## 2019-10-09 PROCEDURE — 99214 OFFICE O/P EST MOD 30 MIN: CPT | Mod: 25,HCNC,S$GLB, | Performed by: FAMILY MEDICINE

## 2019-10-09 PROCEDURE — G0008 FLU VACCINE - HIGH DOSE (65+) PRESERVATIVE FREE IM: ICD-10-PCS | Mod: HCNC,S$GLB,, | Performed by: FAMILY MEDICINE

## 2019-10-09 PROCEDURE — 99999 PR PBB SHADOW E&M-EST. PATIENT-LVL V: ICD-10-PCS | Mod: PBBFAC,HCNC,, | Performed by: FAMILY MEDICINE

## 2019-10-09 PROCEDURE — 3074F SYST BP LT 130 MM HG: CPT | Mod: HCNC,CPTII,S$GLB, | Performed by: FAMILY MEDICINE

## 2019-10-09 PROCEDURE — 71046 XR CHEST PA AND LATERAL: ICD-10-PCS | Mod: 26,HCNC,, | Performed by: RADIOLOGY

## 2019-10-09 PROCEDURE — 1101F PR PT FALLS ASSESS DOC 0-1 FALLS W/OUT INJ PAST YR: ICD-10-PCS | Mod: HCNC,CPTII,S$GLB, | Performed by: FAMILY MEDICINE

## 2019-10-09 RX ORDER — CLOTRIMAZOLE AND BETAMETHASONE DIPROPIONATE 10; .64 MG/G; MG/G
CREAM TOPICAL 2 TIMES DAILY
Qty: 45 G | Refills: 0 | Status: SHIPPED | OUTPATIENT
Start: 2019-10-09 | End: 2019-11-01 | Stop reason: SDUPTHER

## 2019-10-09 NOTE — PROGRESS NOTES
"Subjective:       Patient ID: Desirae No is a 69 y.o. female.    Chief Complaint: Hospital Follow Up    HPI   Hospital Follow-up  68yo female presents today for follow-up after recent admission to [facility] secondary to sepsis attributed to UTI. She had been experiencing a cough and shortness of breath prior to admission and was subsequently treated empirically for PNA as well (Zosyn and Vancomycin). She was discharged home on Cefdinir and has completed treatment. She reports low grade temperature but no fever since discharge. She has also been nauseated and has a mild cough. She denies any shortness of breath. She is receiving HH twice weekly. Notes that she has a rash under the left breast that is causing discomfort. She has noted an increase in redness within the past 2-3 days. She has never seen her Urologist in clinic though she has a suprapubic catheter and supplies are being renewed. No follow-up arrangements were made to see Dr. Hinkle at the time of hospital discharge.    Review of Systems   Constitutional: Positive for activity change, appetite change and fatigue.   HENT: Negative for congestion, ear pain, postnasal drip and sore throat.    Eyes: Negative for visual disturbance.   Respiratory: Negative for cough and shortness of breath.    Cardiovascular: Negative for chest pain and palpitations.   Gastrointestinal: Positive for constipation. Negative for abdominal pain and diarrhea.   Genitourinary: Negative for difficulty urinating and dysuria.   Musculoskeletal: Positive for back pain. Negative for myalgias.   Skin: Positive for rash.   Neurological: Negative for dizziness, weakness and headaches.   Psychiatric/Behavioral: Negative for dysphoric mood and sleep disturbance. The patient is not nervous/anxious.        Objective:   /70   Pulse 81   Temp 98.7 °F (37.1 °C) (Temporal)   Resp 16   Ht 5' 8" (1.727 m)   Wt 105.3 kg (232 lb 0.6 oz)   LMP 04/24/1974   SpO2 96%   BMI 35.28 kg/m² "   Physical Exam   Constitutional: She is oriented to person, place, and time. She appears well-developed and well-nourished. No distress.   Obese, non-toxic   HENT:   Head: Normocephalic and atraumatic.   Right Ear: External ear normal.   Left Ear: External ear normal.   Nose: Nose normal.   Mouth/Throat: Oropharynx is clear and moist.   Eyes: Pupils are equal, round, and reactive to light. Conjunctivae and EOM are normal.   Neck: Normal range of motion. Neck supple.   Cardiovascular: Normal rate, regular rhythm and normal heart sounds.   Pulmonary/Chest: Effort normal and breath sounds normal. No respiratory distress. She has no wheezes.   Abdominal: Soft. Bowel sounds are normal.   Genitourinary:   Genitourinary Comments: Suprapubic catheter in place   Musculoskeletal: She exhibits no edema.   Neurological: She is alert and oriented to person, place, and time.   Skin: Skin is warm and dry. She is not diaphoretic.        Psychiatric: She has a normal mood and affect. Her behavior is normal.       Assessment:       1. Sepsis, due to unspecified organism    2. Complicated UTI (urinary tract infection)    3. Suprapubic catheter    4. Cough    5. Atherosclerosis of aorta    6. Chronic pain syndrome    7. Candidal intertrigo    8. Mixed headache    9. Obesity (BMI 30-39.9)        Plan:      Sepsis, due to unspecified organism  Will assess labs as below. She has completed antibiotics and remains in need of evaluation per Urology. Recommend she contact Dr. Hinkle's office for scheduling- she acknowledges understanding.   -     CBC auto differential; Future; Expected date: 10/09/2019  -     Basic metabolic panel; Future; Expected date: 10/09/2019    Complicated UTI (urinary tract infection)  As above.    Suprapubic catheter  As above.    Cough  No acute findings on CXR.  -     X-Ray Chest PA And Lateral; Future; Expected date: 10/09/2019    Atherosclerosis of aorta  BP and lipid control remain advised.    Chronic pain  syndrome  As per Pain management.    Candidal intertrigo  -     clotrimazole-betamethasone 1-0.05% (LOTRISONE) cream; Apply topically 2 (two) times daily.  Dispense: 45 g; Refill: 0    Mixed headache  Discussed Tylenol use.    Obesity (BMI 30-39.9)  Weight loss efforts are encouraged through diet and lifestyle measures.     Other orders  -     Influenza - High Dose (65+) (PF) (IM)

## 2019-11-01 ENCOUNTER — TELEPHONE (OUTPATIENT)
Dept: HOME HEALTH SERVICES | Facility: HOSPITAL | Age: 69
End: 2019-11-01

## 2019-11-01 DIAGNOSIS — B37.2 CANDIDAL INTERTRIGO: ICD-10-CM

## 2019-11-01 RX ORDER — CLOTRIMAZOLE AND BETAMETHASONE DIPROPIONATE 10; .64 MG/G; MG/G
CREAM TOPICAL 2 TIMES DAILY
Qty: 45 G | Refills: 0 | Status: SHIPPED | OUTPATIENT
Start: 2019-11-01 | End: 2019-11-06 | Stop reason: SDUPTHER

## 2019-11-06 ENCOUNTER — OFFICE VISIT (OUTPATIENT)
Dept: FAMILY MEDICINE | Facility: CLINIC | Age: 69
End: 2019-11-06
Payer: MEDICARE

## 2019-11-06 VITALS
HEIGHT: 68 IN | TEMPERATURE: 98 F | OXYGEN SATURATION: 98 % | BODY MASS INDEX: 34.08 KG/M2 | DIASTOLIC BLOOD PRESSURE: 86 MMHG | RESPIRATION RATE: 16 BRPM | HEART RATE: 88 BPM | WEIGHT: 224.88 LBS | SYSTOLIC BLOOD PRESSURE: 138 MMHG

## 2019-11-06 DIAGNOSIS — Z93.59 SUPRAPUBIC CATHETER: ICD-10-CM

## 2019-11-06 DIAGNOSIS — I70.0 ATHEROSCLEROSIS OF AORTA: ICD-10-CM

## 2019-11-06 DIAGNOSIS — E66.9 OBESITY (BMI 30-39.9): ICD-10-CM

## 2019-11-06 DIAGNOSIS — B37.2 CANDIDAL INTERTRIGO: ICD-10-CM

## 2019-11-06 DIAGNOSIS — L02.211 ABSCESS OF ABDOMINAL WALL: Primary | ICD-10-CM

## 2019-11-06 DIAGNOSIS — G89.4 CHRONIC PAIN SYNDROME: ICD-10-CM

## 2019-11-06 PROCEDURE — 99999 PR PBB SHADOW E&M-EST. PATIENT-LVL IV: ICD-10-PCS | Mod: PBBFAC,HCNC,, | Performed by: FAMILY MEDICINE

## 2019-11-06 PROCEDURE — 1101F PR PT FALLS ASSESS DOC 0-1 FALLS W/OUT INJ PAST YR: ICD-10-PCS | Mod: HCNC,CPTII,S$GLB, | Performed by: FAMILY MEDICINE

## 2019-11-06 PROCEDURE — 99214 PR OFFICE/OUTPT VISIT, EST, LEVL IV, 30-39 MIN: ICD-10-PCS | Mod: HCNC,S$GLB,, | Performed by: FAMILY MEDICINE

## 2019-11-06 PROCEDURE — 3075F PR MOST RECENT SYSTOLIC BLOOD PRESS GE 130-139MM HG: ICD-10-PCS | Mod: HCNC,CPTII,S$GLB, | Performed by: FAMILY MEDICINE

## 2019-11-06 PROCEDURE — 3075F SYST BP GE 130 - 139MM HG: CPT | Mod: HCNC,CPTII,S$GLB, | Performed by: FAMILY MEDICINE

## 2019-11-06 PROCEDURE — 3079F PR MOST RECENT DIASTOLIC BLOOD PRESSURE 80-89 MM HG: ICD-10-PCS | Mod: HCNC,CPTII,S$GLB, | Performed by: FAMILY MEDICINE

## 2019-11-06 PROCEDURE — 99999 PR PBB SHADOW E&M-EST. PATIENT-LVL IV: CPT | Mod: PBBFAC,HCNC,, | Performed by: FAMILY MEDICINE

## 2019-11-06 PROCEDURE — 1101F PT FALLS ASSESS-DOCD LE1/YR: CPT | Mod: HCNC,CPTII,S$GLB, | Performed by: FAMILY MEDICINE

## 2019-11-06 PROCEDURE — 3079F DIAST BP 80-89 MM HG: CPT | Mod: HCNC,CPTII,S$GLB, | Performed by: FAMILY MEDICINE

## 2019-11-06 PROCEDURE — 99214 OFFICE O/P EST MOD 30 MIN: CPT | Mod: HCNC,S$GLB,, | Performed by: FAMILY MEDICINE

## 2019-11-06 RX ORDER — MUPIROCIN 20 MG/G
OINTMENT TOPICAL 3 TIMES DAILY
Qty: 15 G | Refills: 0 | Status: SHIPPED | OUTPATIENT
Start: 2019-11-06 | End: 2023-09-26

## 2019-11-06 RX ORDER — CLOTRIMAZOLE AND BETAMETHASONE DIPROPIONATE 10; .64 MG/G; MG/G
CREAM TOPICAL 2 TIMES DAILY
Qty: 45 G | Refills: 0 | Status: SHIPPED | OUTPATIENT
Start: 2019-11-06 | End: 2023-09-26

## 2019-11-06 RX ORDER — SULFAMETHOXAZOLE AND TRIMETHOPRIM 800; 160 MG/1; MG/1
1 TABLET ORAL 2 TIMES DAILY
Qty: 14 TABLET | Refills: 0 | Status: SHIPPED | OUTPATIENT
Start: 2019-11-06 | End: 2021-05-14

## 2019-11-06 NOTE — PROGRESS NOTES
"Subjective:       Patient ID: Desirae No is a 69 y.o. female.    Chief Complaint: Rash    HPI   Rash  68yo female presents today with concern for rash along the abdominal fold. This morning she was palpating the area and expressed some drainage from one spot. 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 There are two additional spots to the left upper leg. No drainage is noted from either. She has been afebrile. She has a suprapubic catheter in place. Notes that the rash is near the catheter. She has history of candidal intertrigo under both breasts and has been provided with Lotrisone- this has afforded benefit. She has been applying the Lotrisone to the abdomen as well but this does not seem to afford benefit. She is unable to visualize the area clearly. Since her last visit she has had updated Urology evaluation and notes that she has been advised to return in 6 months. Notes she is now on medical marijuana with the intent to discontinue opioid use. She is seeing Dr. Arriola for management.     Review of Systems   Constitutional: Negative for chills, fatigue and fever.   HENT: Negative for congestion, ear pain, postnasal drip and sinus pressure.    Eyes: Negative for visual disturbance.   Respiratory: Negative for cough and shortness of breath.    Cardiovascular: Negative for chest pain and palpitations.   Gastrointestinal: Negative for abdominal pain, constipation, diarrhea and nausea.   Genitourinary: Negative for decreased urine volume and difficulty urinating.   Musculoskeletal: Positive for arthralgias and back pain. Negative for myalgias.   Skin: Positive for rash. Negative for color change.   Neurological: Negative for weakness and headaches.   Psychiatric/Behavioral: Negative for dysphoric mood and sleep disturbance.       Objective:   /86   Pulse 88   Temp 98.4 °F (36.9 °C) (Temporal)   Resp 16   Ht 5' 8" (1.727 m)   Wt 102 kg (224 lb 13.9 oz)   LMP 04/24/1974   SpO2 98% "   BMI 34.19 kg/m²   Physical Exam   Constitutional: She is oriented to person, place, and time. She appears well-developed and well-nourished.   Obese, non-toxic   HENT:   Head: Normocephalic and atraumatic.   Right Ear: Tympanic membrane, external ear and ear canal normal.   Left Ear: Tympanic membrane, external ear and ear canal normal.   Nose: Nose normal.   Mouth/Throat: Oropharynx is clear and moist.   Eyes: Pupils are equal, round, and reactive to light. Conjunctivae and EOM are normal.   Neck: Normal range of motion. Neck supple.   Cardiovascular: Normal rate, regular rhythm and normal heart sounds.   Pulmonary/Chest: Effort normal and breath sounds normal.   Abdominal: Soft. Bowel sounds are normal.   Musculoskeletal: She exhibits no edema.   Neurological: She is alert and oriented to person, place, and time.   Skin: Skin is warm and dry. No rash noted. There is erythema.        Psychiatric: She has a normal mood and affect. Her behavior is normal.       Assessment:       1. Abscess of abdominal wall    2. Candidal intertrigo    3. Suprapubic catheter    4. Atherosclerosis of aorta    5. Chronic pain syndrome    6. Obesity (BMI 30-39.9)        Plan:      Abscess of abdominal wall  Discussed warm compress application. Given that the area has been open and draining I&D is not indicated at this time. Will initiate Bactrim and topical Bactroban with close monitoring advised. Low threshold for Gen Surg eval should symptoms evolve. Will reassess clinically in 1 week- sooner if needed.   -     mupirocin (BACTROBAN) 2 % ointment; Apply topically 3 (three) times daily.  Dispense: 15 g; Refill: 0  -     sulfamethoxazole-trimethoprim 800-160mg (BACTRIM DS) 800-160 mg Tab; Take 1 tablet by mouth 2 (two) times daily.  Dispense: 14 tablet; Refill: 0    Candidal intertrigo  Recommend further assessment per Dermatology- the location so close to her suprapubic catheter is concerning though I see no evidence today of  cellulitis. Should further worsening occur she is advised to seek eval in the ER.   -     clotrimazole-betamethasone 1-0.05% (LOTRISONE) cream; Apply topically 2 (two) times daily.  Dispense: 45 g; Refill: 0  -     Ambulatory Referral to Dermatology    Suprapubic catheter  As per Urology.    Atherosclerosis of aorta  Continue with statin use in combination with heart healthy diet.    Chronic pain syndrome  As per Pain Management.    Obesity (BMI 30-39.9)  Weight loss efforts remain encouraged.

## 2019-12-03 PROCEDURE — G0179 PR HOME HEALTH MD RECERTIFICATION: ICD-10-PCS | Mod: ,,, | Performed by: SURGERY

## 2019-12-03 PROCEDURE — G0179 MD RECERTIFICATION HHA PT: HCPCS | Mod: ,,, | Performed by: SURGERY

## 2019-12-07 DIAGNOSIS — L02.211 ABSCESS OF ABDOMINAL WALL: ICD-10-CM

## 2019-12-09 ENCOUNTER — TELEPHONE (OUTPATIENT)
Dept: FAMILY MEDICINE | Facility: CLINIC | Age: 69
End: 2019-12-09

## 2019-12-09 RX ORDER — MUPIROCIN 20 MG/G
OINTMENT TOPICAL
Qty: 22 G | OUTPATIENT
Start: 2019-12-09

## 2019-12-09 NOTE — TELEPHONE ENCOUNTER
----- Message from Cindy Resendez sent at 12/9/2019 11:11 AM CST -----  Contact: pt  Would like to consult with nurse regarding her seen by a dermatologist, she forgot the name of doctor she was given. Please give a call back  [phone].            Thanks,  Cindy RODGERS

## 2019-12-11 ENCOUNTER — OFFICE VISIT (OUTPATIENT)
Dept: DERMATOLOGY | Facility: CLINIC | Age: 69
End: 2019-12-11
Payer: MEDICARE

## 2019-12-11 DIAGNOSIS — L73.2 HIDRADENITIS SUPPURATIVA: Primary | ICD-10-CM

## 2019-12-11 PROCEDURE — 1159F MED LIST DOCD IN RCRD: CPT | Mod: HCNC,S$GLB,, | Performed by: STUDENT IN AN ORGANIZED HEALTH CARE EDUCATION/TRAINING PROGRAM

## 2019-12-11 PROCEDURE — 99999 PR PBB SHADOW E&M-EST. PATIENT-LVL II: CPT | Mod: PBBFAC,HCNC,, | Performed by: STUDENT IN AN ORGANIZED HEALTH CARE EDUCATION/TRAINING PROGRAM

## 2019-12-11 PROCEDURE — 99999 PR PBB SHADOW E&M-EST. PATIENT-LVL II: ICD-10-PCS | Mod: PBBFAC,HCNC,, | Performed by: STUDENT IN AN ORGANIZED HEALTH CARE EDUCATION/TRAINING PROGRAM

## 2019-12-11 PROCEDURE — 1126F PR PAIN SEVERITY QUANTIFIED, NO PAIN PRESENT: ICD-10-PCS | Mod: HCNC,S$GLB,, | Performed by: STUDENT IN AN ORGANIZED HEALTH CARE EDUCATION/TRAINING PROGRAM

## 2019-12-11 PROCEDURE — 99202 OFFICE O/P NEW SF 15 MIN: CPT | Mod: HCNC,S$GLB,, | Performed by: STUDENT IN AN ORGANIZED HEALTH CARE EDUCATION/TRAINING PROGRAM

## 2019-12-11 PROCEDURE — 1159F PR MEDICATION LIST DOCUMENTED IN MEDICAL RECORD: ICD-10-PCS | Mod: HCNC,S$GLB,, | Performed by: STUDENT IN AN ORGANIZED HEALTH CARE EDUCATION/TRAINING PROGRAM

## 2019-12-11 PROCEDURE — 1126F AMNT PAIN NOTED NONE PRSNT: CPT | Mod: HCNC,S$GLB,, | Performed by: STUDENT IN AN ORGANIZED HEALTH CARE EDUCATION/TRAINING PROGRAM

## 2019-12-11 PROCEDURE — 99202 PR OFFICE/OUTPT VISIT, NEW, LEVL II, 15-29 MIN: ICD-10-PCS | Mod: HCNC,S$GLB,, | Performed by: STUDENT IN AN ORGANIZED HEALTH CARE EDUCATION/TRAINING PROGRAM

## 2019-12-11 PROCEDURE — 1101F PR PT FALLS ASSESS DOC 0-1 FALLS W/OUT INJ PAST YR: ICD-10-PCS | Mod: HCNC,CPTII,S$GLB, | Performed by: STUDENT IN AN ORGANIZED HEALTH CARE EDUCATION/TRAINING PROGRAM

## 2019-12-11 PROCEDURE — 1101F PT FALLS ASSESS-DOCD LE1/YR: CPT | Mod: HCNC,CPTII,S$GLB, | Performed by: STUDENT IN AN ORGANIZED HEALTH CARE EDUCATION/TRAINING PROGRAM

## 2019-12-11 RX ORDER — DOXYCYCLINE 100 MG/1
100 CAPSULE ORAL DAILY
Qty: 30 CAPSULE | Refills: 0 | OUTPATIENT
Start: 2019-12-11 | End: 2021-06-29

## 2019-12-11 RX ORDER — CHLORHEXIDINE GLUCONATE 40 MG/ML
SOLUTION TOPICAL WEEKLY
Qty: 118 ML | Refills: 0 | Status: SHIPPED | OUTPATIENT
Start: 2019-12-11 | End: 2023-09-26

## 2019-12-11 RX ORDER — CLINDAMYCIN PHOSPHATE 10 UG/ML
LOTION TOPICAL 2 TIMES DAILY
Qty: 60 ML | Refills: 1 | Status: SHIPPED | OUTPATIENT
Start: 2019-12-11 | End: 2023-09-26

## 2019-12-11 NOTE — PROGRESS NOTES
Subjective:       Patient ID:  Desirae No is a 69 y.o. female who presents for   Chief Complaint   Patient presents with    Recurrent Skin Infections     History of Present Illness: The patient presents with chief complaint of recurrent abscesses and boils.  Location: under breast (largely under the left breast), occassionally under the armpit  Duration: recurrent for 1 year  Signs/Symptoms: painful, draining boils  Prior treatments: multiple rounds of antibitoics        Review of Systems   Skin: Negative for rash.        Objective:    Physical Exam   Constitutional: She appears well-developed and well-nourished. No distress.   Neurological: She is alert and oriented to person, place, and time. She is not disoriented.   Psychiatric: She has a normal mood and affect.   Skin:   Areas Examined (abnormalities noted in diagram):   Head / Face Inspection Performed  Neck Inspection Performed  Chest / Axilla Inspection Performed             Diagram Legend     Erythematous scaling macule/papule c/w actinic keratosis       Vascular papule c/w angioma      Pigmented verrucoid papule/plaque c/w seborrheic keratosis      Yellow umbilicated papule c/w sebaceous hyperplasia      Irregularly shaped tan macule c/w lentigo     1-2 mm smooth white papules consistent with Milia      Movable subcutaneous cyst with punctum c/w epidermal inclusion cyst      Subcutaneous movable cyst c/w pilar cyst      Firm pink to brown papule c/w dermatofibroma      Pedunculated fleshy papule(s) c/w skin tag(s)      Evenly pigmented macule c/w junctional nevus     Mildly variegated pigmented, slightly irregular-bordered macule c/w mildly atypical nevus      Flesh colored to evenly pigmented papule c/w intradermal nevus       Pink pearly papule/plaque c/w basal cell carcinoma      Erythematous hyperkeratotic cursted plaque c/w SCC      Surgical scar with no sign of skin cancer recurrence      Open and closed comedones      Inflammatory papules  and pustules      Verrucoid papule consistent consistent with wart     Erythematous eczematous patches and plaques     Dystrophic onycholytic nail with subungual debris c/w onychomycosis     Umbilicated papule    Erythematous-base heme-crusted tan verrucoid plaque consistent with inflamed seborrheic keratosis     Erythematous Silvery Scaling Plaque c/w Psoriasis     See annotation      Assessment / Plan:        Hidradenitis suppurativa  -     doxycycline (VIBRAMYCIN) 100 MG Cap; Take 1 capsule (100 mg total) by mouth once daily.  Dispense: 30 capsule; Refill: 0  -     clindamycin (CLEOCIN T) 1 % lotion; Apply topically 2 (two) times daily.  Dispense: 60 mL; Refill: 1  -     chlorhexidine (HIBICLENS) 4 % external liquid; Apply topically once a week. Apply from the neck down. Do not apply to face.  Dispense: 118 mL; Refill: 0             Follow up in about 8 weeks (around 2/5/2020).

## 2019-12-11 NOTE — LETTER
December 11, 2019      Fauzia Muro MD  [address] [facility] [address]           [facility]  [address] [facility] [address] [facility] [address]  Phone: [phone]  Fax: [phone]          Patient: Desirae No   MR Number: [id number]   YOB: 1950   Date of Visit: 12/11/2019       Dear Dr. Fauzia Muro:    Thank you for referring Desirae No to me for evaluation. Attached you will find relevant portions of my assessment and plan of care.    If you have questions, please do not hesitate to call me. I look forward to following Desirae No along with you.    Sincerely,    Davdi Zambrano MD    Enclosure  CC:  No Recipients    If you would like to receive this communication electronically, please contact [email or web address] or [phone] to request more information on [employer] Link access.    For providers and/or their staff who would like to refer a patient to [facility], please contact us through our one-stop-shop provider referral line, [facility], at [phone].    If you feel you have received this communication in error or would no longer like to receive these types of communications, please e-mail [email or web address]

## 2019-12-16 ENCOUNTER — EXTERNAL HOME HEALTH (OUTPATIENT)
Dept: HOME HEALTH SERVICES | Facility: HOSPITAL | Age: 69
End: 2019-12-16
Payer: MEDICARE

## 2020-01-23 ENCOUNTER — NURSE TRIAGE (OUTPATIENT)
Dept: ADMINISTRATIVE | Facility: CLINIC | Age: 70
End: 2020-01-23

## 2020-01-23 NOTE — TELEPHONE ENCOUNTER
Pt states she has been having diarrhea for the past couple of days, she states that it is coming out even when she's sleeping, she denies having it over the past 4 hours but does state she has upper stomach pain at a 9/10, admits to taking imodium, she states she is not eaten and has only had a little sprite, advised her to go to ER, pt refuses disposition, advised her to drink water for hydration and to call back with any needs or concerns, caller agreed    Reason for Disposition   [1] SEVERE abdominal pain AND [2] age > 60    Additional Information   Negative: Shock suspected (e.g., cold/pale/clammy skin, too weak to stand, low BP, rapid pulse)   Negative: Difficult to awaken or acting confused (e.g., disoriented, slurred speech)   Negative: Sounds like a life-threatening emergency to the triager   Negative: Vomiting also present and worse than the diarrhea   Negative: [1] Blood in stool AND [2] without diarrhea   Negative: Diarrhea in a cancer patient who is currently (or recently) receiving chemotherapy or radiation therapy, or cancer patient who has metastatic or end-stage cancer and is receiving palliative care   Negative: [1] SEVERE abdominal pain (e.g., excruciating) AND [2] present > 1 hour    Protocols used: DIARRHEA-A-

## 2020-02-01 PROCEDURE — G0179 PR HOME HEALTH MD RECERTIFICATION: ICD-10-PCS | Mod: ,,, | Performed by: SURGERY

## 2020-02-01 PROCEDURE — G0179 MD RECERTIFICATION HHA PT: HCPCS | Mod: ,,, | Performed by: SURGERY

## 2020-02-12 ENCOUNTER — EXTERNAL HOME HEALTH (OUTPATIENT)
Dept: HOME HEALTH SERVICES | Facility: HOSPITAL | Age: 70
End: 2020-02-12
Payer: MEDICARE

## 2020-03-24 ENCOUNTER — TELEPHONE (OUTPATIENT)
Dept: FAMILY MEDICINE | Facility: CLINIC | Age: 70
End: 2020-03-24

## 2020-03-24 NOTE — TELEPHONE ENCOUNTER
Patient notified that she needs to monitor her tempeture at home and self quarantine for 14 days. Call us should her tempeture get to 100.4 or she develops a cough or s.o.b and we will let her know her next steps. Patient verbalized understanding and will let us know if any issue come up

## 2020-03-24 NOTE — TELEPHONE ENCOUNTER
----- Message from Cindy Resendez sent at 3/24/2020 10:32 AM CDT -----  Contact: pt   Would like to consult with nurse regarding temperature being 99.6, wants to know should she go get tested for virus. Please give a call back at [phone].            Thanks,  Cindy RODGERS

## 2020-03-25 ENCOUNTER — TELEPHONE (OUTPATIENT)
Dept: FAMILY MEDICINE | Facility: CLINIC | Age: 70
End: 2020-03-25

## 2020-03-25 NOTE — TELEPHONE ENCOUNTER
----- Message from Sanchez Porras sent at 3/25/2020  2:21 PM CDT -----  Contact: Migue from [facility][1]   Type:  Needs Medical Advice    Who Called: pt   Symptoms (please be specific): pains in bladder / low grade temp/ thinks she's developing a UTI /has a catheter super pubic/ 99.6 temp/ cathter came out on Monday and was placed back on Monday night. Pt still having the pains/ migue thinks it may have been a faulty catheter the balloon was not inflated / states the pt's urine did appear clear  How long has patient had these symptoms:   [facility] #:  [facility]  Would the patient rather a call back or a response via [facility]? phone  Best Call Back Number:  [phone]  Additional Information: is calling to see if the Dr's office can call in an antibiotic or a urine sample. Has called in Uro and hasn't recevied a call back on the pt     [facility] - [place], [place] - [address] [place] [id number]  Phone: [phone] Fax: [phone]

## 2020-03-25 NOTE — TELEPHONE ENCOUNTER
[facility] was informed that pt has not seen Dr. Li since November. We are unable to give a order due to Dr. Li not sending pt to [facility]. They will call pt urologist again and find out the next steps since pt is c/o uti symptoms since she received new cathter.

## 2020-03-27 ENCOUNTER — TELEPHONE (OUTPATIENT)
Dept: FAMILY MEDICINE | Facility: CLINIC | Age: 70
End: 2020-03-27

## 2020-03-27 NOTE — TELEPHONE ENCOUNTER
----- Message from Fauzia Muro MD sent at 3/27/2020 11:08 AM CDT -----  Contact: Patient  She needs to contact her Urologist. She has a suprapubic catheter in place.   ----- Message -----  From: Leia Sams LPN  Sent: 3/27/2020  11:06 AM CDT  To: Fauzia Muro MD    Pt is saying she has a UTI. Do you want her to come into the clinic?  ----- Message -----  From: Flaquita Olguin  Sent: 3/27/2020  10:48 AM CDT  To: J[facility] Cage Staff    Patient states that she has a UTI and needs an antibiotic- [facility]. Please call her back at [phone]. Thank you

## 2020-04-01 PROCEDURE — G0179 PR HOME HEALTH MD RECERTIFICATION: ICD-10-PCS | Mod: ,,, | Performed by: SURGERY

## 2020-04-01 PROCEDURE — G0179 MD RECERTIFICATION HHA PT: HCPCS | Mod: ,,, | Performed by: SURGERY

## 2020-04-08 ENCOUNTER — EXTERNAL HOME HEALTH (OUTPATIENT)
Dept: HOME HEALTH SERVICES | Facility: HOSPITAL | Age: 70
End: 2020-04-08
Payer: MEDICARE

## 2020-04-15 ENCOUNTER — TELEPHONE (OUTPATIENT)
Dept: FAMILY MEDICINE | Facility: CLINIC | Age: 70
End: 2020-04-15

## 2020-04-15 NOTE — TELEPHONE ENCOUNTER
----- Message from Bernadette Barragan sent at 4/15/2020  1:25 PM CDT -----  Contact: pt  Pt states she called earlier regarding a nasal sore, pt need to know the status on what to do , waiting to hear from nurse. Please call pt @ [phone].

## 2020-04-23 DIAGNOSIS — I70.0 ATHEROSCLEROSIS OF AORTA: ICD-10-CM

## 2020-04-23 RX ORDER — ATORVASTATIN CALCIUM 10 MG/1
TABLET, FILM COATED ORAL
Qty: 90 TABLET | Refills: 0 | Status: SHIPPED | OUTPATIENT
Start: 2020-04-23 | End: 2023-09-28

## 2020-05-31 PROCEDURE — G0179 PR HOME HEALTH MD RECERTIFICATION: ICD-10-PCS | Mod: ,,, | Performed by: SURGERY

## 2020-05-31 PROCEDURE — G0179 MD RECERTIFICATION HHA PT: HCPCS | Mod: ,,, | Performed by: SURGERY

## 2020-06-04 ENCOUNTER — EXTERNAL HOME HEALTH (OUTPATIENT)
Dept: HOME HEALTH SERVICES | Facility: HOSPITAL | Age: 70
End: 2020-06-04
Payer: MEDICARE

## 2020-06-16 ENCOUNTER — TELEPHONE (OUTPATIENT)
Dept: FAMILY MEDICINE | Facility: CLINIC | Age: 70
End: 2020-06-16

## 2020-06-16 NOTE — TELEPHONE ENCOUNTER
----- Message from Sanchez Porras sent at 6/16/2020  2:43 PM CDT -----  Contact: [facility] [place]  Type:  Needs Medical Advice    Who Called:  owen  Symptoms (please be specific): flushed in face and SOB/ Fever  was normal when she was there and was 100.1 when she woke up   How long has patient had these symptoms: went out to eat a few days ago   Pharmacy name and phone #:     Would the patient rather a call back or a response via [facility]? phone  Best Call Back Number:  [phone]  Additional Information: is not sure if pt should get tested for covid but is wanting to let the Dr's office know how the pt felt. Pt informed she didn't have a good night

## 2020-06-16 NOTE — TELEPHONE ENCOUNTER
----- Message from Sanchez Porras sent at 6/16/2020  2:43 PM CDT -----  Contact: [facility] [place]  Type:  Needs Medical Advice    Who Called:  owen  Symptoms (please be specific): flushed in face and SOB/ Fever  was normal when she was there and was 100.1 when she woke up   How long has patient had these symptoms: went out to eat a few days ago   Pharmacy name and phone #:     Would the patient rather a call back or a response via [facility]? phone  Best Call Back Number:  [phone]  Additional Information: is not sure if pt should get tested for covid but is wanting to let the Dr's office know how the pt felt. Pt informed she didn't have a good night

## 2020-07-30 ENCOUNTER — EXTERNAL HOME HEALTH (OUTPATIENT)
Dept: HOME HEALTH SERVICES | Facility: HOSPITAL | Age: 70
End: 2020-07-30
Payer: MEDICARE

## 2020-07-30 PROCEDURE — G0180 PR HOME HEALTH MD CERTIFICATION: ICD-10-PCS | Mod: ,,, | Performed by: SURGERY

## 2020-07-30 PROCEDURE — G0180 MD CERTIFICATION HHA PATIENT: HCPCS | Mod: ,,, | Performed by: SURGERY

## 2020-08-07 ENCOUNTER — EXTERNAL HOME HEALTH (OUTPATIENT)
Dept: HOME HEALTH SERVICES | Facility: HOSPITAL | Age: 70
End: 2020-08-07
Payer: MEDICARE

## 2020-08-07 NOTE — PROGRESS NOTES
60 Day Recert Order # [id number]  Cert Period: 07/30 to 09/27/2020 with [facility] - Dr. Miguel Verduzco

## 2020-09-28 PROCEDURE — G0179 PR HOME HEALTH MD RECERTIFICATION: ICD-10-PCS | Mod: ,,, | Performed by: SURGERY

## 2020-09-28 PROCEDURE — G0179 MD RECERTIFICATION HHA PT: HCPCS | Mod: ,,, | Performed by: SURGERY

## 2020-10-19 ENCOUNTER — EXTERNAL HOME HEALTH (OUTPATIENT)
Dept: HOME HEALTH SERVICES | Facility: HOSPITAL | Age: 70
End: 2020-10-19
Payer: MEDICARE

## 2020-11-27 PROCEDURE — G0179 MD RECERTIFICATION HHA PT: HCPCS | Mod: ,,, | Performed by: SURGERY

## 2020-11-27 PROCEDURE — G0179 PR HOME HEALTH MD RECERTIFICATION: ICD-10-PCS | Mod: ,,, | Performed by: SURGERY

## 2020-12-14 ENCOUNTER — EXTERNAL HOME HEALTH (OUTPATIENT)
Dept: HOME HEALTH SERVICES | Facility: HOSPITAL | Age: 70
End: 2020-12-14
Payer: MEDICARE

## 2021-01-21 ENCOUNTER — TELEPHONE (OUTPATIENT)
Dept: ADMINISTRATIVE | Facility: HOSPITAL | Age: 71
End: 2021-01-21

## 2021-01-26 PROCEDURE — G0179 PR HOME HEALTH MD RECERTIFICATION: ICD-10-PCS | Mod: ,,, | Performed by: SURGERY

## 2021-01-26 PROCEDURE — G0179 MD RECERTIFICATION HHA PT: HCPCS | Mod: ,,, | Performed by: SURGERY

## 2021-02-05 ENCOUNTER — HOSPITAL ENCOUNTER (EMERGENCY)
Facility: HOSPITAL | Age: 71
Discharge: HOME | End: 2021-02-05
Attending: EMERGENCY MEDICINE
Payer: MEDICARE

## 2021-02-05 VITALS
BODY MASS INDEX: 30.46 KG/M2 | RESPIRATION RATE: 18 BRPM | DIASTOLIC BLOOD PRESSURE: 63 MMHG | OXYGEN SATURATION: 98 % | HEIGHT: 70 IN | WEIGHT: 212.75 LBS | HEART RATE: 66 BPM | TEMPERATURE: 98 F | SYSTOLIC BLOOD PRESSURE: 114 MMHG

## 2021-02-05 DIAGNOSIS — M79.604 RIGHT LEG PAIN: Primary | ICD-10-CM

## 2021-02-05 PROCEDURE — 99284 EMERGENCY DEPT VISIT MOD MDM: CPT | Mod: 25

## 2021-02-05 RX ORDER — BUPRENORPHINE HYDROCHLORIDE 900 UG/1
FILM, SOLUBLE BUCCAL
COMMUNITY
Start: 2020-12-29 | End: 2023-09-28

## 2021-02-09 ENCOUNTER — EXTERNAL HOME HEALTH (OUTPATIENT)
Dept: HOME HEALTH SERVICES | Facility: HOSPITAL | Age: 71
End: 2021-02-09
Payer: MEDICARE

## 2021-03-17 ENCOUNTER — TELEPHONE (OUTPATIENT)
Dept: ADMINISTRATIVE | Facility: HOSPITAL | Age: 71
End: 2021-03-17

## 2021-03-27 PROCEDURE — G0179 PR HOME HEALTH MD RECERTIFICATION: ICD-10-PCS | Mod: ,,, | Performed by: SURGERY

## 2021-03-27 PROCEDURE — G0179 MD RECERTIFICATION HHA PT: HCPCS | Mod: ,,, | Performed by: SURGERY

## 2021-03-31 ENCOUNTER — EXTERNAL HOME HEALTH (OUTPATIENT)
Dept: HOME HEALTH SERVICES | Facility: HOSPITAL | Age: 71
End: 2021-03-31
Payer: MEDICARE

## 2021-05-14 ENCOUNTER — HOSPITAL ENCOUNTER (EMERGENCY)
Facility: HOSPITAL | Age: 71
Discharge: HOME | End: 2021-05-15
Attending: EMERGENCY MEDICINE
Payer: MEDICARE

## 2021-05-14 DIAGNOSIS — N39.0 URINARY TRACT INFECTION ASSOCIATED WITH INDWELLING URETHRAL CATHETER, INITIAL ENCOUNTER: Primary | ICD-10-CM

## 2021-05-14 DIAGNOSIS — T83.511A URINARY TRACT INFECTION ASSOCIATED WITH INDWELLING URETHRAL CATHETER, INITIAL ENCOUNTER: Primary | ICD-10-CM

## 2021-05-14 DIAGNOSIS — R42 DIZZINESS: ICD-10-CM

## 2021-05-14 DIAGNOSIS — M25.559 HIP PAIN: ICD-10-CM

## 2021-05-14 DIAGNOSIS — S79.919A HIP INJURY, INITIAL ENCOUNTER: ICD-10-CM

## 2021-05-14 LAB
ALBUMIN SERPL BCP-MCNC: 3.3 G/DL (ref 3.5–5.2)
ALP SERPL-CCNC: 73 U/L (ref 55–135)
ALT SERPL W/O P-5'-P-CCNC: 12 U/L (ref 10–44)
ANION GAP SERPL CALC-SCNC: 9 MMOL/L (ref 8–16)
AST SERPL-CCNC: 13 U/L (ref 10–40)
BACTERIA #/AREA URNS HPF: ABNORMAL /HPF
BASOPHILS # BLD AUTO: 0.03 K/UL (ref 0–0.2)
BASOPHILS NFR BLD: 0.4 % (ref 0–1.9)
BILIRUB SERPL-MCNC: 0.3 MG/DL (ref 0.1–1)
BILIRUB UR QL STRIP: NEGATIVE
BUN SERPL-MCNC: 13 MG/DL (ref 8–23)
CALCIUM SERPL-MCNC: 8.9 MG/DL (ref 8.7–10.5)
CHLORIDE SERPL-SCNC: 104 MMOL/L (ref 95–110)
CLARITY UR: CLEAR
CO2 SERPL-SCNC: 27 MMOL/L (ref 23–29)
COLOR UR: YELLOW
CREAT SERPL-MCNC: 1 MG/DL (ref 0.5–1.4)
DIFFERENTIAL METHOD: ABNORMAL
EOSINOPHIL # BLD AUTO: 0.6 K/UL (ref 0–0.5)
EOSINOPHIL NFR BLD: 7.3 % (ref 0–8)
ERYTHROCYTE [DISTWIDTH] IN BLOOD BY AUTOMATED COUNT: 12.8 % (ref 11.5–14.5)
EST. GFR  (AFRICAN AMERICAN): >60 ML/MIN/1.73 M^2
EST. GFR  (NON AFRICAN AMERICAN): 56 ML/MIN/1.73 M^2
GLUCOSE SERPL-MCNC: 95 MG/DL (ref 70–110)
GLUCOSE UR QL STRIP: NEGATIVE
HCT VFR BLD AUTO: 41 % (ref 37–48.5)
HGB BLD-MCNC: 12.9 G/DL (ref 12–16)
HGB UR QL STRIP: NEGATIVE
IMM GRANULOCYTES # BLD AUTO: 0.03 K/UL (ref 0–0.04)
IMM GRANULOCYTES NFR BLD AUTO: 0.4 % (ref 0–0.5)
KETONES UR QL STRIP: NEGATIVE
LEUKOCYTE ESTERASE UR QL STRIP: ABNORMAL
LYMPHOCYTES # BLD AUTO: 2.8 K/UL (ref 1–4.8)
LYMPHOCYTES NFR BLD: 33 % (ref 18–48)
MCH RBC QN AUTO: 29.3 PG (ref 27–31)
MCHC RBC AUTO-ENTMCNC: 31.5 G/DL (ref 32–36)
MCV RBC AUTO: 93 FL (ref 82–98)
MICROSCOPIC COMMENT: ABNORMAL
MONOCYTES # BLD AUTO: 0.8 K/UL (ref 0.3–1)
MONOCYTES NFR BLD: 9.4 % (ref 4–15)
NEUTROPHILS # BLD AUTO: 4.2 K/UL (ref 1.8–7.7)
NEUTROPHILS NFR BLD: 49.5 % (ref 38–73)
NITRITE UR QL STRIP: POSITIVE
NRBC BLD-RTO: 0 /100 WBC
PH UR STRIP: 6 [PH] (ref 5–8)
PLATELET # BLD AUTO: 258 K/UL (ref 150–450)
PMV BLD AUTO: 9.2 FL (ref 9.2–12.9)
POTASSIUM SERPL-SCNC: 4 MMOL/L (ref 3.5–5.1)
PROT SERPL-MCNC: 6.6 G/DL (ref 6–8.4)
PROT UR QL STRIP: NEGATIVE
RBC # BLD AUTO: 4.4 M/UL (ref 4–5.4)
RBC #/AREA URNS HPF: 1 /HPF (ref 0–4)
SODIUM SERPL-SCNC: 140 MMOL/L (ref 136–145)
SP GR UR STRIP: 1.01 (ref 1–1.03)
SQUAMOUS #/AREA URNS HPF: 3 /HPF
URN SPEC COLLECT METH UR: ABNORMAL
UROBILINOGEN UR STRIP-ACNC: NEGATIVE EU/DL
WBC # BLD AUTO: 8.4 K/UL (ref 3.9–12.7)
WBC #/AREA URNS HPF: 28 /HPF (ref 0–5)

## 2021-05-14 PROCEDURE — 96365 THER/PROPH/DIAG IV INF INIT: CPT

## 2021-05-14 PROCEDURE — 93010 ELECTROCARDIOGRAM REPORT: CPT | Mod: ,,, | Performed by: INTERNAL MEDICINE

## 2021-05-14 PROCEDURE — 93010 EKG 12-LEAD: ICD-10-PCS | Mod: ,,, | Performed by: INTERNAL MEDICINE

## 2021-05-14 PROCEDURE — 25000003 PHARM REV CODE 250: Performed by: NURSE PRACTITIONER

## 2021-05-14 PROCEDURE — 96375 TX/PRO/DX INJ NEW DRUG ADDON: CPT

## 2021-05-14 PROCEDURE — 81000 URINALYSIS NONAUTO W/SCOPE: CPT | Performed by: NURSE PRACTITIONER

## 2021-05-14 PROCEDURE — 93005 ELECTROCARDIOGRAM TRACING: CPT

## 2021-05-14 PROCEDURE — 80053 COMPREHEN METABOLIC PANEL: CPT | Performed by: NURSE PRACTITIONER

## 2021-05-14 PROCEDURE — 85025 COMPLETE CBC W/AUTO DIFF WBC: CPT | Performed by: NURSE PRACTITIONER

## 2021-05-14 PROCEDURE — 96366 THER/PROPH/DIAG IV INF ADDON: CPT

## 2021-05-14 PROCEDURE — 63600175 PHARM REV CODE 636 W HCPCS: Performed by: NURSE PRACTITIONER

## 2021-05-14 PROCEDURE — 99285 EMERGENCY DEPT VISIT HI MDM: CPT | Mod: 25

## 2021-05-14 RX ORDER — HYDROMORPHONE HYDROCHLORIDE 1 MG/ML
1 INJECTION, SOLUTION INTRAMUSCULAR; INTRAVENOUS; SUBCUTANEOUS
Status: COMPLETED | OUTPATIENT
Start: 2021-05-15 | End: 2021-05-15

## 2021-05-14 RX ORDER — HYDROMORPHONE HYDROCHLORIDE 1 MG/ML
1 INJECTION, SOLUTION INTRAMUSCULAR; INTRAVENOUS; SUBCUTANEOUS
Status: COMPLETED | OUTPATIENT
Start: 2021-05-14 | End: 2021-05-14

## 2021-05-14 RX ORDER — SULFAMETHOXAZOLE AND TRIMETHOPRIM 800; 160 MG/1; MG/1
1 TABLET ORAL 2 TIMES DAILY
Qty: 14 TABLET | Refills: 0 | Status: SHIPPED | OUTPATIENT
Start: 2021-05-14 | End: 2021-05-21

## 2021-05-14 RX ADMIN — PROMETHAZINE HYDROCHLORIDE 12.5 MG: 25 INJECTION INTRAMUSCULAR; INTRAVENOUS at 09:05

## 2021-05-14 RX ADMIN — HYDROMORPHONE HYDROCHLORIDE 1 MG: 1 INJECTION, SOLUTION INTRAMUSCULAR; INTRAVENOUS; SUBCUTANEOUS at 09:05

## 2021-05-14 RX ADMIN — HYDROMORPHONE HYDROCHLORIDE 1 MG: 1 INJECTION, SOLUTION INTRAMUSCULAR; INTRAVENOUS; SUBCUTANEOUS at 10:05

## 2021-05-15 VITALS
BODY MASS INDEX: 33.34 KG/M2 | HEIGHT: 68 IN | HEART RATE: 77 BPM | WEIGHT: 220 LBS | TEMPERATURE: 99 F | OXYGEN SATURATION: 95 % | RESPIRATION RATE: 18 BRPM | DIASTOLIC BLOOD PRESSURE: 61 MMHG | SYSTOLIC BLOOD PRESSURE: 131 MMHG

## 2021-05-15 PROCEDURE — 63600175 PHARM REV CODE 636 W HCPCS: Performed by: NURSE PRACTITIONER

## 2021-05-15 PROCEDURE — 96376 TX/PRO/DX INJ SAME DRUG ADON: CPT

## 2021-05-15 RX ADMIN — HYDROMORPHONE HYDROCHLORIDE 1 MG: 1 INJECTION, SOLUTION INTRAMUSCULAR; INTRAVENOUS; SUBCUTANEOUS at 12:05

## 2021-05-26 PROCEDURE — G0179 MD RECERTIFICATION HHA PT: HCPCS | Mod: ,,, | Performed by: SURGERY

## 2021-05-26 PROCEDURE — G0179 PR HOME HEALTH MD RECERTIFICATION: ICD-10-PCS | Mod: ,,, | Performed by: SURGERY

## 2021-06-01 ENCOUNTER — TELEPHONE (OUTPATIENT)
Dept: NEUROSURGERY | Facility: CLINIC | Age: 71
End: 2021-06-01

## 2021-06-02 ENCOUNTER — EXTERNAL HOME HEALTH (OUTPATIENT)
Dept: HOME HEALTH SERVICES | Facility: HOSPITAL | Age: 71
End: 2021-06-02
Payer: MEDICARE

## 2021-06-28 PROCEDURE — 96366 THER/PROPH/DIAG IV INF ADDON: CPT

## 2021-06-28 PROCEDURE — 96367 TX/PROPH/DG ADDL SEQ IV INF: CPT

## 2021-06-28 PROCEDURE — 82962 GLUCOSE BLOOD TEST: CPT

## 2021-06-28 PROCEDURE — 96365 THER/PROPH/DIAG IV INF INIT: CPT

## 2021-06-28 PROCEDURE — 99285 EMERGENCY DEPT VISIT HI MDM: CPT | Mod: 25

## 2021-06-29 ENCOUNTER — HOSPITAL ENCOUNTER (OUTPATIENT)
Facility: HOSPITAL | Age: 71
Discharge: HOME | End: 2021-06-29
Attending: EMERGENCY MEDICINE | Admitting: INTERNAL MEDICINE
Payer: MEDICARE

## 2021-06-29 ENCOUNTER — TELEPHONE (OUTPATIENT)
Dept: UROLOGY | Facility: CLINIC | Age: 71
End: 2021-06-29

## 2021-06-29 VITALS
SYSTOLIC BLOOD PRESSURE: 109 MMHG | TEMPERATURE: 100 F | HEART RATE: 97 BPM | OXYGEN SATURATION: 93 % | HEIGHT: 68 IN | RESPIRATION RATE: 20 BRPM | DIASTOLIC BLOOD PRESSURE: 73 MMHG | BODY MASS INDEX: 33.45 KG/M2

## 2021-06-29 DIAGNOSIS — R07.9 CHEST PAIN: ICD-10-CM

## 2021-06-29 DIAGNOSIS — N39.0 ACUTE LOWER UTI: Primary | ICD-10-CM

## 2021-06-29 DIAGNOSIS — R10.9 ABDOMINAL PAIN: ICD-10-CM

## 2021-06-29 DIAGNOSIS — E87.6 HYPOKALEMIA: ICD-10-CM

## 2021-06-29 DIAGNOSIS — N20.1 RIGHT URETERAL CALCULUS: ICD-10-CM

## 2021-06-29 PROBLEM — N20.0 NEPHROLITHIASIS: Status: ACTIVE | Noted: 2021-06-29

## 2021-06-29 LAB
ALBUMIN SERPL BCP-MCNC: 3.5 G/DL (ref 3.5–5.2)
ALBUMIN SERPL BCP-MCNC: 3.9 G/DL (ref 3.5–5.2)
ALP SERPL-CCNC: 87 U/L (ref 55–135)
ALP SERPL-CCNC: 90 U/L (ref 55–135)
ALT SERPL W/O P-5'-P-CCNC: 19 U/L (ref 10–44)
ALT SERPL W/O P-5'-P-CCNC: 21 U/L (ref 10–44)
ANION GAP SERPL CALC-SCNC: 11 MMOL/L (ref 8–16)
ANION GAP SERPL CALC-SCNC: 13 MMOL/L (ref 8–16)
AST SERPL-CCNC: 17 U/L (ref 10–40)
AST SERPL-CCNC: 19 U/L (ref 10–40)
BACTERIA #/AREA URNS HPF: ABNORMAL /HPF
BASOPHILS # BLD AUTO: 0.05 K/UL (ref 0–0.2)
BASOPHILS # BLD AUTO: 0.08 K/UL (ref 0–0.2)
BASOPHILS NFR BLD: 0.3 % (ref 0–1.9)
BASOPHILS NFR BLD: 0.4 % (ref 0–1.9)
BILIRUB SERPL-MCNC: 1 MG/DL (ref 0.1–1)
BILIRUB SERPL-MCNC: 1.1 MG/DL (ref 0.1–1)
BILIRUB UR QL STRIP: NEGATIVE
BUN SERPL-MCNC: 11 MG/DL (ref 8–23)
BUN SERPL-MCNC: 12 MG/DL (ref 8–23)
CALCIUM SERPL-MCNC: 10 MG/DL (ref 8.7–10.5)
CALCIUM SERPL-MCNC: 9.2 MG/DL (ref 8.7–10.5)
CAOX CRY URNS QL MICRO: ABNORMAL
CHLORIDE SERPL-SCNC: 102 MMOL/L (ref 95–110)
CHLORIDE SERPL-SCNC: 102 MMOL/L (ref 95–110)
CLARITY UR: CLEAR
CO2 SERPL-SCNC: 26 MMOL/L (ref 23–29)
CO2 SERPL-SCNC: 28 MMOL/L (ref 23–29)
COLOR UR: YELLOW
CREAT SERPL-MCNC: 0.8 MG/DL (ref 0.5–1.4)
CREAT SERPL-MCNC: 1.1 MG/DL (ref 0.5–1.4)
CTP QC/QA: YES
DIFFERENTIAL METHOD: ABNORMAL
DIFFERENTIAL METHOD: ABNORMAL
EOSINOPHIL # BLD AUTO: 0.1 K/UL (ref 0–0.5)
EOSINOPHIL # BLD AUTO: 0.2 K/UL (ref 0–0.5)
EOSINOPHIL NFR BLD: 0.4 % (ref 0–8)
EOSINOPHIL NFR BLD: 0.9 % (ref 0–8)
ERYTHROCYTE [DISTWIDTH] IN BLOOD BY AUTOMATED COUNT: 13.1 % (ref 11.5–14.5)
ERYTHROCYTE [DISTWIDTH] IN BLOOD BY AUTOMATED COUNT: 13.2 % (ref 11.5–14.5)
EST. GFR  (AFRICAN AMERICAN): 58 ML/MIN/1.73 M^2
EST. GFR  (AFRICAN AMERICAN): >60 ML/MIN/1.73 M^2
EST. GFR  (NON AFRICAN AMERICAN): 50 ML/MIN/1.73 M^2
EST. GFR  (NON AFRICAN AMERICAN): >60 ML/MIN/1.73 M^2
GLUCOSE SERPL-MCNC: 107 MG/DL (ref 70–110)
GLUCOSE SERPL-MCNC: 98 MG/DL (ref 70–110)
GLUCOSE UR QL STRIP: NEGATIVE
HCT VFR BLD AUTO: 42.4 % (ref 37–48.5)
HCT VFR BLD AUTO: 46.1 % (ref 37–48.5)
HCV AB SERPL QL IA: NEGATIVE
HEP C VIRUS HOLD SPECIMEN: NORMAL
HGB BLD-MCNC: 14 G/DL (ref 12–16)
HGB BLD-MCNC: 14.9 G/DL (ref 12–16)
HGB UR QL STRIP: ABNORMAL
HYALINE CASTS #/AREA URNS LPF: 0 /LPF
IMM GRANULOCYTES # BLD AUTO: 0.09 K/UL (ref 0–0.04)
IMM GRANULOCYTES # BLD AUTO: 0.19 K/UL (ref 0–0.04)
IMM GRANULOCYTES NFR BLD AUTO: 0.5 % (ref 0–0.5)
IMM GRANULOCYTES NFR BLD AUTO: 0.9 % (ref 0–0.5)
INR PPP: 1 (ref 0.8–1.2)
KETONES UR QL STRIP: ABNORMAL
LEUKOCYTE ESTERASE UR QL STRIP: ABNORMAL
LIPASE SERPL-CCNC: 21 U/L (ref 4–60)
LYMPHOCYTES # BLD AUTO: 3.3 K/UL (ref 1–4.8)
LYMPHOCYTES # BLD AUTO: 3.9 K/UL (ref 1–4.8)
LYMPHOCYTES NFR BLD: 15.3 % (ref 18–48)
LYMPHOCYTES NFR BLD: 21.7 % (ref 18–48)
MAGNESIUM SERPL-MCNC: 1.9 MG/DL (ref 1.6–2.6)
MCH RBC QN AUTO: 29.4 PG (ref 27–31)
MCH RBC QN AUTO: 29.6 PG (ref 27–31)
MCHC RBC AUTO-ENTMCNC: 32.3 G/DL (ref 32–36)
MCHC RBC AUTO-ENTMCNC: 33 G/DL (ref 32–36)
MCV RBC AUTO: 89 FL (ref 82–98)
MCV RBC AUTO: 92 FL (ref 82–98)
MICROSCOPIC COMMENT: ABNORMAL
MONOCYTES # BLD AUTO: 1.3 K/UL (ref 0.3–1)
MONOCYTES # BLD AUTO: 1.5 K/UL (ref 0.3–1)
MONOCYTES NFR BLD: 7 % (ref 4–15)
MONOCYTES NFR BLD: 7.2 % (ref 4–15)
NEUTROPHILS # BLD AUTO: 12.4 K/UL (ref 1.8–7.7)
NEUTROPHILS # BLD AUTO: 16.1 K/UL (ref 1.8–7.7)
NEUTROPHILS NFR BLD: 69.6 % (ref 38–73)
NEUTROPHILS NFR BLD: 75.8 % (ref 38–73)
NITRITE UR QL STRIP: POSITIVE
NRBC BLD-RTO: 0 /100 WBC
NRBC BLD-RTO: 0 /100 WBC
PH UR STRIP: 7 [PH] (ref 5–8)
PHOSPHATE SERPL-MCNC: 2.9 MG/DL (ref 2.7–4.5)
PLATELET # BLD AUTO: 374 K/UL (ref 150–450)
PLATELET # BLD AUTO: 391 K/UL (ref 150–450)
PMV BLD AUTO: 9.1 FL (ref 9.2–12.9)
PMV BLD AUTO: 9.1 FL (ref 9.2–12.9)
POCT GLUCOSE: 107 MG/DL (ref 70–110)
POTASSIUM SERPL-SCNC: 2.7 MMOL/L (ref 3.5–5.1)
POTASSIUM SERPL-SCNC: 3.8 MMOL/L (ref 3.5–5.1)
PROT SERPL-MCNC: 6.9 G/DL (ref 6–8.4)
PROT SERPL-MCNC: 7.7 G/DL (ref 6–8.4)
PROT UR QL STRIP: ABNORMAL
PROTHROMBIN TIME: 11.1 SEC (ref 9–12.5)
RBC # BLD AUTO: 4.77 M/UL (ref 4–5.4)
RBC # BLD AUTO: 5.03 M/UL (ref 4–5.4)
RBC #/AREA URNS HPF: 1 /HPF (ref 0–4)
SARS-COV-2 RDRP RESP QL NAA+PROBE: NEGATIVE
SODIUM SERPL-SCNC: 139 MMOL/L (ref 136–145)
SODIUM SERPL-SCNC: 143 MMOL/L (ref 136–145)
SP GR UR STRIP: 1.01 (ref 1–1.03)
TROPONIN I SERPL DL<=0.01 NG/ML-MCNC: 0.06 NG/ML (ref 0–0.03)
TROPONIN I SERPL DL<=0.01 NG/ML-MCNC: 0.06 NG/ML (ref 0–0.03)
URN SPEC COLLECT METH UR: ABNORMAL
UROBILINOGEN UR STRIP-ACNC: 1 EU/DL
WBC # BLD AUTO: 17.87 K/UL (ref 3.9–12.7)
WBC # BLD AUTO: 21.22 K/UL (ref 3.9–12.7)
WBC #/AREA URNS HPF: 5 /HPF (ref 0–5)

## 2021-06-29 PROCEDURE — 96376 TX/PRO/DX INJ SAME DRUG ADON: CPT

## 2021-06-29 PROCEDURE — G0378 HOSPITAL OBSERVATION PER HR: HCPCS

## 2021-06-29 PROCEDURE — 96365 THER/PROPH/DIAG IV INF INIT: CPT | Mod: 59

## 2021-06-29 PROCEDURE — 85610 PROTHROMBIN TIME: CPT | Performed by: INTERNAL MEDICINE

## 2021-06-29 PROCEDURE — 86803 HEPATITIS C AB TEST: CPT | Performed by: EMERGENCY MEDICINE

## 2021-06-29 PROCEDURE — 25000003 PHARM REV CODE 250: Performed by: NURSE PRACTITIONER

## 2021-06-29 PROCEDURE — 85025 COMPLETE CBC W/AUTO DIFF WBC: CPT | Mod: 91 | Performed by: INTERNAL MEDICINE

## 2021-06-29 PROCEDURE — 99204 PR OFFICE/OUTPT VISIT, NEW, LEVL IV, 45-59 MIN: ICD-10-PCS | Mod: ,,, | Performed by: UROLOGY

## 2021-06-29 PROCEDURE — 25000003 PHARM REV CODE 250: Performed by: INTERNAL MEDICINE

## 2021-06-29 PROCEDURE — 80053 COMPREHEN METABOLIC PANEL: CPT | Performed by: EMERGENCY MEDICINE

## 2021-06-29 PROCEDURE — 63600175 PHARM REV CODE 636 W HCPCS: Performed by: EMERGENCY MEDICINE

## 2021-06-29 PROCEDURE — 99204 OFFICE O/P NEW MOD 45 MIN: CPT | Mod: ,,, | Performed by: UROLOGY

## 2021-06-29 PROCEDURE — U0002 COVID-19 LAB TEST NON-CDC: HCPCS | Performed by: INTERNAL MEDICINE

## 2021-06-29 PROCEDURE — 96366 THER/PROPH/DIAG IV INF ADDON: CPT

## 2021-06-29 PROCEDURE — 81000 URINALYSIS NONAUTO W/SCOPE: CPT | Performed by: EMERGENCY MEDICINE

## 2021-06-29 PROCEDURE — 83690 ASSAY OF LIPASE: CPT | Performed by: EMERGENCY MEDICINE

## 2021-06-29 PROCEDURE — 25000003 PHARM REV CODE 250: Performed by: EMERGENCY MEDICINE

## 2021-06-29 PROCEDURE — 63600175 PHARM REV CODE 636 W HCPCS: Performed by: NURSE PRACTITIONER

## 2021-06-29 PROCEDURE — 83735 ASSAY OF MAGNESIUM: CPT | Performed by: INTERNAL MEDICINE

## 2021-06-29 PROCEDURE — 85025 COMPLETE CBC W/AUTO DIFF WBC: CPT | Performed by: EMERGENCY MEDICINE

## 2021-06-29 PROCEDURE — 84484 ASSAY OF TROPONIN QUANT: CPT | Performed by: INTERNAL MEDICINE

## 2021-06-29 PROCEDURE — 84100 ASSAY OF PHOSPHORUS: CPT | Performed by: INTERNAL MEDICINE

## 2021-06-29 PROCEDURE — 96375 TX/PRO/DX INJ NEW DRUG ADDON: CPT

## 2021-06-29 PROCEDURE — 96367 TX/PROPH/DG ADDL SEQ IV INF: CPT

## 2021-06-29 PROCEDURE — 80053 COMPREHEN METABOLIC PANEL: CPT | Mod: 91 | Performed by: INTERNAL MEDICINE

## 2021-06-29 RX ORDER — FENTANYL CITRATE 50 UG/ML
25 INJECTION, SOLUTION INTRAMUSCULAR; INTRAVENOUS
Status: COMPLETED | OUTPATIENT
Start: 2021-06-29 | End: 2021-06-29

## 2021-06-29 RX ORDER — POTASSIUM CHLORIDE 20 MEQ/1
40 TABLET, EXTENDED RELEASE ORAL
Status: COMPLETED | OUTPATIENT
Start: 2021-06-29 | End: 2021-06-29

## 2021-06-29 RX ORDER — PROMETHAZINE HYDROCHLORIDE 25 MG/1
25 TABLET ORAL EVERY 8 HOURS PRN
Qty: 12 TABLET | Refills: 0 | Status: SHIPPED | OUTPATIENT
Start: 2021-06-29 | End: 2023-09-26

## 2021-06-29 RX ORDER — OXYBUTYNIN CHLORIDE 5 MG/1
5 TABLET ORAL 2 TIMES DAILY
Status: DISCONTINUED | OUTPATIENT
Start: 2021-06-29 | End: 2021-06-29 | Stop reason: HOSPADM

## 2021-06-29 RX ORDER — FENTANYL CITRATE 50 UG/ML
50 INJECTION, SOLUTION INTRAMUSCULAR; INTRAVENOUS
Status: COMPLETED | OUTPATIENT
Start: 2021-06-29 | End: 2021-06-29

## 2021-06-29 RX ORDER — IBUPROFEN 400 MG/1
400 TABLET ORAL EVERY 6 HOURS PRN
Status: DISCONTINUED | OUTPATIENT
Start: 2021-06-29 | End: 2021-06-29 | Stop reason: HOSPADM

## 2021-06-29 RX ORDER — IBUPROFEN 200 MG
24 TABLET ORAL
Status: DISCONTINUED | OUTPATIENT
Start: 2021-06-29 | End: 2021-06-29 | Stop reason: HOSPADM

## 2021-06-29 RX ORDER — TAMSULOSIN HYDROCHLORIDE 0.4 MG/1
0.4 CAPSULE ORAL DAILY
Qty: 14 CAPSULE | Refills: 0 | Status: SHIPPED | OUTPATIENT
Start: 2021-06-30 | End: 2023-09-28

## 2021-06-29 RX ORDER — TAMSULOSIN HYDROCHLORIDE 0.4 MG/1
0.4 CAPSULE ORAL
Status: COMPLETED | OUTPATIENT
Start: 2021-06-29 | End: 2021-06-29

## 2021-06-29 RX ORDER — AMOXICILLIN 250 MG
1 CAPSULE ORAL 2 TIMES DAILY
Status: DISCONTINUED | OUTPATIENT
Start: 2021-06-29 | End: 2021-06-29 | Stop reason: HOSPADM

## 2021-06-29 RX ORDER — QUETIAPINE FUMARATE 100 MG/1
400 TABLET, FILM COATED ORAL NIGHTLY
Status: DISCONTINUED | OUTPATIENT
Start: 2021-06-29 | End: 2021-06-29 | Stop reason: HOSPADM

## 2021-06-29 RX ORDER — PROMETHAZINE HYDROCHLORIDE 25 MG/1
25 TABLET ORAL EVERY 6 HOURS PRN
Qty: 12 TABLET | Refills: 0 | Status: SHIPPED | OUTPATIENT
Start: 2021-06-29

## 2021-06-29 RX ORDER — CLONAZEPAM 1 MG/1
2 TABLET ORAL 2 TIMES DAILY
Status: DISCONTINUED | OUTPATIENT
Start: 2021-06-29 | End: 2021-06-29 | Stop reason: HOSPADM

## 2021-06-29 RX ORDER — GLUCAGON 1 MG
1 KIT INJECTION
Status: DISCONTINUED | OUTPATIENT
Start: 2021-06-29 | End: 2021-06-29 | Stop reason: HOSPADM

## 2021-06-29 RX ORDER — MORPHINE SULFATE 4 MG/ML
4 INJECTION, SOLUTION INTRAMUSCULAR; INTRAVENOUS
Status: COMPLETED | OUTPATIENT
Start: 2021-06-29 | End: 2021-06-29

## 2021-06-29 RX ORDER — ATORVASTATIN CALCIUM 10 MG/1
10 TABLET, FILM COATED ORAL NIGHTLY
Status: DISCONTINUED | OUTPATIENT
Start: 2021-06-29 | End: 2021-06-29 | Stop reason: HOSPADM

## 2021-06-29 RX ORDER — LEVOFLOXACIN 750 MG/1
750 TABLET ORAL DAILY
Qty: 7 TABLET | Refills: 0 | Status: SHIPPED | OUTPATIENT
Start: 2021-06-30 | End: 2021-07-07

## 2021-06-29 RX ORDER — INSULIN ASPART 100 [IU]/ML
0-5 INJECTION, SOLUTION INTRAVENOUS; SUBCUTANEOUS
Status: DISCONTINUED | OUTPATIENT
Start: 2021-06-29 | End: 2021-06-29 | Stop reason: HOSPADM

## 2021-06-29 RX ORDER — SODIUM CHLORIDE 0.9 % (FLUSH) 0.9 %
10 SYRINGE (ML) INJECTION
Status: DISCONTINUED | OUTPATIENT
Start: 2021-06-29 | End: 2021-06-29 | Stop reason: HOSPADM

## 2021-06-29 RX ORDER — TAMSULOSIN HYDROCHLORIDE 0.4 MG/1
0.4 CAPSULE ORAL DAILY
Status: DISCONTINUED | OUTPATIENT
Start: 2021-06-29 | End: 2021-06-29

## 2021-06-29 RX ORDER — IBUPROFEN 200 MG
16 TABLET ORAL
Status: DISCONTINUED | OUTPATIENT
Start: 2021-06-29 | End: 2021-06-29 | Stop reason: HOSPADM

## 2021-06-29 RX ORDER — TAMSULOSIN HYDROCHLORIDE 0.4 MG/1
0.4 CAPSULE ORAL DAILY
Status: DISCONTINUED | OUTPATIENT
Start: 2021-06-30 | End: 2021-06-29 | Stop reason: HOSPADM

## 2021-06-29 RX ORDER — ASPIRIN 81 MG/1
81 TABLET ORAL DAILY
Status: DISCONTINUED | OUTPATIENT
Start: 2021-06-29 | End: 2021-06-29 | Stop reason: HOSPADM

## 2021-06-29 RX ORDER — ONDANSETRON 8 MG/1
8 TABLET, ORALLY DISINTEGRATING ORAL EVERY 8 HOURS PRN
Status: DISCONTINUED | OUTPATIENT
Start: 2021-06-29 | End: 2021-06-29

## 2021-06-29 RX ORDER — LEVOFLOXACIN 750 MG/1
750 TABLET ORAL DAILY
Status: DISCONTINUED | OUTPATIENT
Start: 2021-06-29 | End: 2021-06-29 | Stop reason: HOSPADM

## 2021-06-29 RX ORDER — BUPRENORPHINE 2 MG/1
2 TABLET SUBLINGUAL DAILY
Status: DISCONTINUED | OUTPATIENT
Start: 2021-06-29 | End: 2021-06-29 | Stop reason: HOSPADM

## 2021-06-29 RX ADMIN — MORPHINE SULFATE 4 MG: 4 INJECTION INTRAVENOUS at 03:06

## 2021-06-29 RX ADMIN — STANDARDIZED SENNA CONCENTRATE AND DOCUSATE SODIUM 1 TABLET: 8.6; 5 TABLET ORAL at 10:06

## 2021-06-29 RX ADMIN — TAMSULOSIN HYDROCHLORIDE 0.4 MG: 0.4 CAPSULE ORAL at 05:06

## 2021-06-29 RX ADMIN — FENTANYL CITRATE 25 MCG: 50 INJECTION INTRAMUSCULAR; INTRAVENOUS at 04:06

## 2021-06-29 RX ADMIN — ASPIRIN 81 MG: 81 TABLET, COATED ORAL at 10:06

## 2021-06-29 RX ADMIN — SODIUM CHLORIDE 500 ML: 0.9 INJECTION, SOLUTION INTRAVENOUS at 02:06

## 2021-06-29 RX ADMIN — POTASSIUM CHLORIDE 40 MEQ: 1500 TABLET, EXTENDED RELEASE ORAL at 03:06

## 2021-06-29 RX ADMIN — OXYBUTYNIN CHLORIDE 5 MG: 5 TABLET ORAL at 10:06

## 2021-06-29 RX ADMIN — BUPRENORPHINE 2 MG: 2 TABLET SUBLINGUAL at 08:06

## 2021-06-29 RX ADMIN — PROMETHAZINE HYDROCHLORIDE 12.5 MG: 25 INJECTION INTRAMUSCULAR; INTRAVENOUS at 02:06

## 2021-06-29 RX ADMIN — CLONAZEPAM 2 MG: 1 TABLET ORAL at 10:06

## 2021-06-29 RX ADMIN — LEVOFLOXACIN 750 MG: 750 TABLET, FILM COATED ORAL at 10:06

## 2021-06-29 RX ADMIN — PROMETHAZINE HYDROCHLORIDE 12.5 MG: 25 INJECTION INTRAMUSCULAR; INTRAVENOUS at 08:06

## 2021-06-29 RX ADMIN — FENTANYL CITRATE 50 MCG: 50 INJECTION INTRAMUSCULAR; INTRAVENOUS at 02:06

## 2021-06-29 RX ADMIN — CEFTRIAXONE 1 G: 1 INJECTION, SOLUTION INTRAVENOUS at 02:06

## 2021-07-19 ENCOUNTER — HOSPITAL ENCOUNTER (OUTPATIENT)
Facility: HOSPITAL | Age: 71
Discharge: HOME HEALTH | End: 2021-07-22
Attending: EMERGENCY MEDICINE | Admitting: INTERNAL MEDICINE
Payer: MEDICARE

## 2021-07-19 DIAGNOSIS — A41.9 SEPSIS: ICD-10-CM

## 2021-07-19 DIAGNOSIS — R19.7 NAUSEA VOMITING AND DIARRHEA: ICD-10-CM

## 2021-07-19 DIAGNOSIS — E87.6 HYPOKALEMIA: ICD-10-CM

## 2021-07-19 DIAGNOSIS — R00.0 TACHYCARDIA: ICD-10-CM

## 2021-07-19 DIAGNOSIS — T83.510A URINARY TRACT INFECTION ASSOCIATED WITH CYSTOSTOMY CATHETER, INITIAL ENCOUNTER: Primary | ICD-10-CM

## 2021-07-19 DIAGNOSIS — N39.0 URINARY TRACT INFECTION ASSOCIATED WITH CYSTOSTOMY CATHETER, INITIAL ENCOUNTER: Primary | ICD-10-CM

## 2021-07-19 DIAGNOSIS — R00.0 SINUS TACHYCARDIA: ICD-10-CM

## 2021-07-19 DIAGNOSIS — R11.2 NAUSEA VOMITING AND DIARRHEA: ICD-10-CM

## 2021-07-19 DIAGNOSIS — E86.0 DEHYDRATION: ICD-10-CM

## 2021-07-19 LAB
BASOPHILS # BLD AUTO: 0.09 K/UL (ref 0–0.2)
BASOPHILS NFR BLD: 0.5 % (ref 0–1.9)
CTP QC/QA: YES
DIFFERENTIAL METHOD: ABNORMAL
EOSINOPHIL # BLD AUTO: 0 K/UL (ref 0–0.5)
EOSINOPHIL NFR BLD: 0.1 % (ref 0–8)
ERYTHROCYTE [DISTWIDTH] IN BLOOD BY AUTOMATED COUNT: 13.4 % (ref 11.5–14.5)
HCT VFR BLD AUTO: 47.4 % (ref 37–48.5)
HGB BLD-MCNC: 15.7 G/DL (ref 12–16)
IMM GRANULOCYTES # BLD AUTO: 0.17 K/UL (ref 0–0.04)
IMM GRANULOCYTES NFR BLD AUTO: 0.9 % (ref 0–0.5)
LYMPHOCYTES # BLD AUTO: 1.9 K/UL (ref 1–4.8)
LYMPHOCYTES NFR BLD: 9.8 % (ref 18–48)
MCH RBC QN AUTO: 29.5 PG (ref 27–31)
MCHC RBC AUTO-ENTMCNC: 33.1 G/DL (ref 32–36)
MCV RBC AUTO: 89 FL (ref 82–98)
MONOCYTES # BLD AUTO: 1.2 K/UL (ref 0.3–1)
MONOCYTES NFR BLD: 6.3 % (ref 4–15)
NEUTROPHILS # BLD AUTO: 16.2 K/UL (ref 1.8–7.7)
NEUTROPHILS NFR BLD: 82.4 % (ref 38–73)
NRBC BLD-RTO: 0 /100 WBC
PLATELET # BLD AUTO: 422 K/UL (ref 150–450)
PMV BLD AUTO: 9.3 FL (ref 9.2–12.9)
RBC # BLD AUTO: 5.32 M/UL (ref 4–5.4)
SARS-COV-2 RDRP RESP QL NAA+PROBE: NEGATIVE
WBC # BLD AUTO: 19.7 K/UL (ref 3.9–12.7)

## 2021-07-19 PROCEDURE — 93005 ELECTROCARDIOGRAM TRACING: CPT

## 2021-07-19 PROCEDURE — 81000 URINALYSIS NONAUTO W/SCOPE: CPT | Performed by: NURSE PRACTITIONER

## 2021-07-19 PROCEDURE — 87086 URINE CULTURE/COLONY COUNT: CPT | Performed by: NURSE PRACTITIONER

## 2021-07-19 PROCEDURE — 93010 ELECTROCARDIOGRAM REPORT: CPT | Mod: ,,, | Performed by: INTERNAL MEDICINE

## 2021-07-19 PROCEDURE — 87040 BLOOD CULTURE FOR BACTERIA: CPT | Mod: 59 | Performed by: NURSE PRACTITIONER

## 2021-07-19 PROCEDURE — 93010 EKG 12-LEAD: ICD-10-PCS | Mod: ,,, | Performed by: INTERNAL MEDICINE

## 2021-07-19 PROCEDURE — 99291 CRITICAL CARE FIRST HOUR: CPT | Mod: 25

## 2021-07-19 PROCEDURE — U0002 COVID-19 LAB TEST NON-CDC: HCPCS | Performed by: NURSE PRACTITIONER

## 2021-07-19 PROCEDURE — 85025 COMPLETE CBC W/AUTO DIFF WBC: CPT | Performed by: NURSE PRACTITIONER

## 2021-07-19 RX ORDER — ACETAMINOPHEN 500 MG
1000 TABLET ORAL
Status: COMPLETED | OUTPATIENT
Start: 2021-07-19 | End: 2021-07-20

## 2021-07-20 PROBLEM — R10.13 DYSPEPSIA AND OTHER SPECIFIED DISORDERS OF FUNCTION OF STOMACH: Status: ACTIVE | Noted: 2017-06-28

## 2021-07-20 PROBLEM — G89.29 CHRONIC LOW BACK PAIN: Status: ACTIVE | Noted: 2020-01-27

## 2021-07-20 PROBLEM — N39.0 URINARY TRACT INFECTION ASSOCIATED WITH CYSTOSTOMY CATHETER: Status: ACTIVE | Noted: 2021-07-20

## 2021-07-20 PROBLEM — K59.00 CONSTIPATION: Status: ACTIVE | Noted: 2019-09-21

## 2021-07-20 PROBLEM — K31.89 DYSPEPSIA AND OTHER SPECIFIED DISORDERS OF FUNCTION OF STOMACH: Status: ACTIVE | Noted: 2017-06-28

## 2021-07-20 PROBLEM — T83.510A URINARY TRACT INFECTION ASSOCIATED WITH CYSTOSTOMY CATHETER: Status: ACTIVE | Noted: 2021-07-20

## 2021-07-20 PROBLEM — M53.9 OTHER UNSPECIFIED BACK DISORDER: Status: ACTIVE | Noted: 2018-11-20

## 2021-07-20 PROBLEM — A41.9 SEPSIS: Status: ACTIVE | Noted: 2021-07-20

## 2021-07-20 PROBLEM — N17.9 AKI (ACUTE KIDNEY INJURY): Status: ACTIVE | Noted: 2021-07-20

## 2021-07-20 PROBLEM — M54.50 CHRONIC LOW BACK PAIN: Status: ACTIVE | Noted: 2020-01-27

## 2021-07-20 PROBLEM — F41.9 ANXIETY: Status: ACTIVE | Noted: 2021-07-20

## 2021-07-20 PROBLEM — R52 PAIN: Status: ACTIVE | Noted: 2021-07-20

## 2021-07-20 LAB
ALBUMIN SERPL BCP-MCNC: 4.1 G/DL (ref 3.5–5.2)
ALP SERPL-CCNC: 90 U/L (ref 55–135)
ALT SERPL W/O P-5'-P-CCNC: 23 U/L (ref 10–44)
ANION GAP SERPL CALC-SCNC: 13 MMOL/L (ref 8–16)
ANION GAP SERPL CALC-SCNC: 17 MMOL/L (ref 8–16)
AST SERPL-CCNC: 21 U/L (ref 10–40)
BACTERIA #/AREA URNS HPF: ABNORMAL /HPF
BASOPHILS # BLD AUTO: 0.04 K/UL (ref 0–0.2)
BASOPHILS NFR BLD: 0.3 % (ref 0–1.9)
BILIRUB SERPL-MCNC: 1.4 MG/DL (ref 0.1–1)
BILIRUB UR QL STRIP: ABNORMAL
BUN SERPL-MCNC: 18 MG/DL (ref 8–23)
BUN SERPL-MCNC: 19 MG/DL (ref 8–23)
CALCIUM SERPL-MCNC: 10.5 MG/DL (ref 8.7–10.5)
CALCIUM SERPL-MCNC: 9.2 MG/DL (ref 8.7–10.5)
CAOX CRY URNS QL MICRO: ABNORMAL
CHLORIDE SERPL-SCNC: 101 MMOL/L (ref 95–110)
CHLORIDE SERPL-SCNC: 103 MMOL/L (ref 95–110)
CLARITY UR: CLEAR
CO2 SERPL-SCNC: 22 MMOL/L (ref 23–29)
CO2 SERPL-SCNC: 23 MMOL/L (ref 23–29)
COLOR UR: YELLOW
CREAT SERPL-MCNC: 1.1 MG/DL (ref 0.5–1.4)
CREAT SERPL-MCNC: 1.3 MG/DL (ref 0.5–1.4)
DIFFERENTIAL METHOD: ABNORMAL
EOSINOPHIL # BLD AUTO: 0 K/UL (ref 0–0.5)
EOSINOPHIL NFR BLD: 0.1 % (ref 0–8)
ERYTHROCYTE [DISTWIDTH] IN BLOOD BY AUTOMATED COUNT: 13.5 % (ref 11.5–14.5)
EST. GFR  (AFRICAN AMERICAN): 47 ML/MIN/1.73 M^2
EST. GFR  (AFRICAN AMERICAN): 58 ML/MIN/1.73 M^2
EST. GFR  (NON AFRICAN AMERICAN): 41 ML/MIN/1.73 M^2
EST. GFR  (NON AFRICAN AMERICAN): 50 ML/MIN/1.73 M^2
GLUCOSE SERPL-MCNC: 108 MG/DL (ref 70–110)
GLUCOSE SERPL-MCNC: 121 MG/DL (ref 70–110)
GLUCOSE UR QL STRIP: NEGATIVE
HCT VFR BLD AUTO: 40.2 % (ref 37–48.5)
HGB BLD-MCNC: 13.3 G/DL (ref 12–16)
HGB UR QL STRIP: ABNORMAL
HYALINE CASTS #/AREA URNS LPF: 3 /LPF
IMM GRANULOCYTES # BLD AUTO: 0.1 K/UL (ref 0–0.04)
IMM GRANULOCYTES NFR BLD AUTO: 0.6 % (ref 0–0.5)
KETONES UR QL STRIP: ABNORMAL
LACTATE SERPL-SCNC: 1.5 MMOL/L (ref 0.5–2.2)
LACTATE SERPL-SCNC: 1.9 MMOL/L (ref 0.5–2.2)
LEUKOCYTE ESTERASE UR QL STRIP: ABNORMAL
LYMPHOCYTES # BLD AUTO: 2.6 K/UL (ref 1–4.8)
LYMPHOCYTES NFR BLD: 16.3 % (ref 18–48)
MAGNESIUM SERPL-MCNC: 1.9 MG/DL (ref 1.6–2.6)
MCH RBC QN AUTO: 29.6 PG (ref 27–31)
MCHC RBC AUTO-ENTMCNC: 33.1 G/DL (ref 32–36)
MCV RBC AUTO: 90 FL (ref 82–98)
MICROSCOPIC COMMENT: ABNORMAL
MONOCYTES # BLD AUTO: 1.2 K/UL (ref 0.3–1)
MONOCYTES NFR BLD: 7.6 % (ref 4–15)
NEUTROPHILS # BLD AUTO: 12 K/UL (ref 1.8–7.7)
NEUTROPHILS NFR BLD: 75.1 % (ref 38–73)
NITRITE UR QL STRIP: POSITIVE
NRBC BLD-RTO: 0 /100 WBC
PH UR STRIP: 6 [PH] (ref 5–8)
PLATELET # BLD AUTO: 336 K/UL (ref 150–450)
PMV BLD AUTO: 10.3 FL (ref 9.2–12.9)
POTASSIUM SERPL-SCNC: 3.2 MMOL/L (ref 3.5–5.1)
POTASSIUM SERPL-SCNC: 3.8 MMOL/L (ref 3.5–5.1)
PROCALCITONIN SERPL IA-MCNC: 0.06 NG/ML
PROT SERPL-MCNC: 7.9 G/DL (ref 6–8.4)
PROT UR QL STRIP: ABNORMAL
RBC # BLD AUTO: 4.49 M/UL (ref 4–5.4)
RBC #/AREA URNS HPF: 5 /HPF (ref 0–4)
SODIUM SERPL-SCNC: 139 MMOL/L (ref 136–145)
SODIUM SERPL-SCNC: 140 MMOL/L (ref 136–145)
SP GR UR STRIP: 1.02 (ref 1–1.03)
URN SPEC COLLECT METH UR: ABNORMAL
UROBILINOGEN UR STRIP-ACNC: NEGATIVE EU/DL
WBC # BLD AUTO: 15.97 K/UL (ref 3.9–12.7)
WBC #/AREA URNS HPF: 11 /HPF (ref 0–5)

## 2021-07-20 PROCEDURE — 25000003 PHARM REV CODE 250: Performed by: INTERNAL MEDICINE

## 2021-07-20 PROCEDURE — 83605 ASSAY OF LACTIC ACID: CPT | Mod: 91 | Performed by: EMERGENCY MEDICINE

## 2021-07-20 PROCEDURE — 96365 THER/PROPH/DIAG IV INF INIT: CPT

## 2021-07-20 PROCEDURE — 96366 THER/PROPH/DIAG IV INF ADDON: CPT

## 2021-07-20 PROCEDURE — 25000003 PHARM REV CODE 250: Performed by: EMERGENCY MEDICINE

## 2021-07-20 PROCEDURE — 84145 PROCALCITONIN (PCT): CPT | Performed by: EMERGENCY MEDICINE

## 2021-07-20 PROCEDURE — 96361 HYDRATE IV INFUSION ADD-ON: CPT

## 2021-07-20 PROCEDURE — 80048 BASIC METABOLIC PNL TOTAL CA: CPT | Performed by: NURSE PRACTITIONER

## 2021-07-20 PROCEDURE — 96372 THER/PROPH/DIAG INJ SC/IM: CPT | Mod: 59

## 2021-07-20 PROCEDURE — G0378 HOSPITAL OBSERVATION PER HR: HCPCS

## 2021-07-20 PROCEDURE — 96367 TX/PROPH/DG ADDL SEQ IV INF: CPT

## 2021-07-20 PROCEDURE — 63600175 PHARM REV CODE 636 W HCPCS: Performed by: NURSE PRACTITIONER

## 2021-07-20 PROCEDURE — 96375 TX/PRO/DX INJ NEW DRUG ADDON: CPT

## 2021-07-20 PROCEDURE — 83605 ASSAY OF LACTIC ACID: CPT | Performed by: NURSE PRACTITIONER

## 2021-07-20 PROCEDURE — 83735 ASSAY OF MAGNESIUM: CPT | Performed by: EMERGENCY MEDICINE

## 2021-07-20 PROCEDURE — 80053 COMPREHEN METABOLIC PANEL: CPT | Performed by: EMERGENCY MEDICINE

## 2021-07-20 PROCEDURE — 63600175 PHARM REV CODE 636 W HCPCS: Performed by: INTERNAL MEDICINE

## 2021-07-20 PROCEDURE — 63600175 PHARM REV CODE 636 W HCPCS: Performed by: EMERGENCY MEDICINE

## 2021-07-20 PROCEDURE — 85025 COMPLETE CBC W/AUTO DIFF WBC: CPT | Performed by: NURSE PRACTITIONER

## 2021-07-20 PROCEDURE — 96376 TX/PRO/DX INJ SAME DRUG ADON: CPT

## 2021-07-20 RX ORDER — OXYBUTYNIN CHLORIDE 5 MG/1
5 TABLET ORAL 2 TIMES DAILY
Status: DISCONTINUED | OUTPATIENT
Start: 2021-07-20 | End: 2021-07-22 | Stop reason: HOSPADM

## 2021-07-20 RX ORDER — TAMSULOSIN HYDROCHLORIDE 0.4 MG/1
0.4 CAPSULE ORAL DAILY
Status: DISCONTINUED | OUTPATIENT
Start: 2021-07-20 | End: 2021-07-22 | Stop reason: HOSPADM

## 2021-07-20 RX ORDER — CLONAZEPAM 1 MG/1
2 TABLET ORAL 2 TIMES DAILY PRN
Status: DISCONTINUED | OUTPATIENT
Start: 2021-07-20 | End: 2021-07-22 | Stop reason: HOSPADM

## 2021-07-20 RX ORDER — HYDRALAZINE HYDROCHLORIDE 20 MG/ML
20 INJECTION INTRAMUSCULAR; INTRAVENOUS
Status: COMPLETED | OUTPATIENT
Start: 2021-07-20 | End: 2021-07-20

## 2021-07-20 RX ORDER — OXYCODONE HYDROCHLORIDE 5 MG/1
10 TABLET ORAL EVERY 6 HOURS PRN
Status: DISCONTINUED | OUTPATIENT
Start: 2021-07-20 | End: 2021-07-22 | Stop reason: HOSPADM

## 2021-07-20 RX ORDER — HYOSCYAMINE SULFATE 0.12 MG/1
0.25 TABLET SUBLINGUAL EVERY 4 HOURS PRN
Status: DISCONTINUED | OUTPATIENT
Start: 2021-07-20 | End: 2021-07-22 | Stop reason: HOSPADM

## 2021-07-20 RX ORDER — CLONIDINE HYDROCHLORIDE 0.2 MG/1
0.2 TABLET ORAL EVERY 8 HOURS PRN
Status: DISCONTINUED | OUTPATIENT
Start: 2021-07-20 | End: 2021-07-22 | Stop reason: HOSPADM

## 2021-07-20 RX ORDER — ACETAMINOPHEN 325 MG/1
650 TABLET ORAL EVERY 6 HOURS PRN
Status: DISCONTINUED | OUTPATIENT
Start: 2021-07-20 | End: 2021-07-22 | Stop reason: HOSPADM

## 2021-07-20 RX ORDER — QUETIAPINE FUMARATE 100 MG/1
400 TABLET, FILM COATED ORAL NIGHTLY
Status: DISCONTINUED | OUTPATIENT
Start: 2021-07-20 | End: 2021-07-22 | Stop reason: HOSPADM

## 2021-07-20 RX ORDER — ATORVASTATIN CALCIUM 10 MG/1
10 TABLET, FILM COATED ORAL NIGHTLY
Status: DISCONTINUED | OUTPATIENT
Start: 2021-07-20 | End: 2021-07-22 | Stop reason: HOSPADM

## 2021-07-20 RX ORDER — MORPHINE SULFATE 4 MG/ML
4 INJECTION, SOLUTION INTRAMUSCULAR; INTRAVENOUS
Status: COMPLETED | OUTPATIENT
Start: 2021-07-20 | End: 2021-07-20

## 2021-07-20 RX ORDER — ASPIRIN 81 MG/1
81 TABLET ORAL DAILY
Status: DISCONTINUED | OUTPATIENT
Start: 2021-07-20 | End: 2021-07-22 | Stop reason: HOSPADM

## 2021-07-20 RX ORDER — SODIUM CHLORIDE 9 MG/ML
INJECTION, SOLUTION INTRAVENOUS CONTINUOUS
Status: ACTIVE | OUTPATIENT
Start: 2021-07-20 | End: 2021-07-21

## 2021-07-20 RX ORDER — ONDANSETRON 2 MG/ML
4 INJECTION INTRAMUSCULAR; INTRAVENOUS
Status: DISCONTINUED | OUTPATIENT
Start: 2021-07-20 | End: 2021-07-20

## 2021-07-20 RX ORDER — ENOXAPARIN SODIUM 100 MG/ML
40 INJECTION SUBCUTANEOUS EVERY 24 HOURS
Status: DISCONTINUED | OUTPATIENT
Start: 2021-07-20 | End: 2021-07-22 | Stop reason: HOSPADM

## 2021-07-20 RX ORDER — SODIUM CHLORIDE 0.9 % (FLUSH) 0.9 %
10 SYRINGE (ML) INJECTION
Status: DISCONTINUED | OUTPATIENT
Start: 2021-07-20 | End: 2021-07-22 | Stop reason: HOSPADM

## 2021-07-20 RX ADMIN — ATORVASTATIN CALCIUM 10 MG: 10 TABLET, FILM COATED ORAL at 09:07

## 2021-07-20 RX ADMIN — QUETIAPINE 400 MG: 100 TABLET ORAL at 09:07

## 2021-07-20 RX ADMIN — TAMSULOSIN HYDROCHLORIDE 0.4 MG: 0.4 CAPSULE ORAL at 09:07

## 2021-07-20 RX ADMIN — OXYCODONE HYDROCHLORIDE 10 MG: 5 TABLET ORAL at 08:07

## 2021-07-20 RX ADMIN — HYOSCYAMINE SULFATE 0.25 MG: 0.12 TABLET ORAL; SUBLINGUAL at 07:07

## 2021-07-20 RX ADMIN — QUETIAPINE 400 MG: 100 TABLET ORAL at 03:07

## 2021-07-20 RX ADMIN — POTASSIUM BICARBONATE 40 MEQ: 391 TABLET, EFFERVESCENT ORAL at 08:07

## 2021-07-20 RX ADMIN — CEFTRIAXONE 1 G: 1 INJECTION, SOLUTION INTRAVENOUS at 10:07

## 2021-07-20 RX ADMIN — OXYBUTYNIN CHLORIDE 5 MG: 5 TABLET ORAL at 09:07

## 2021-07-20 RX ADMIN — HYOSCYAMINE SULFATE 0.25 MG: 0.12 TABLET ORAL; SUBLINGUAL at 01:07

## 2021-07-20 RX ADMIN — POTASSIUM BICARBONATE 40 MEQ: 391 TABLET, EFFERVESCENT ORAL at 03:07

## 2021-07-20 RX ADMIN — ENOXAPARIN SODIUM 40 MG: 40 INJECTION SUBCUTANEOUS at 05:07

## 2021-07-20 RX ADMIN — ACETAMINOPHEN 1000 MG: 500 TABLET ORAL at 12:07

## 2021-07-20 RX ADMIN — SODIUM CHLORIDE, SODIUM LACTATE, POTASSIUM CHLORIDE, AND CALCIUM CHLORIDE 2817 ML: .6; .31; .03; .02 INJECTION, SOLUTION INTRAVENOUS at 12:07

## 2021-07-20 RX ADMIN — PROMETHAZINE HYDROCHLORIDE 12.5 MG: 25 INJECTION INTRAMUSCULAR; INTRAVENOUS at 03:07

## 2021-07-20 RX ADMIN — SODIUM CHLORIDE: 0.9 INJECTION, SOLUTION INTRAVENOUS at 01:07

## 2021-07-20 RX ADMIN — SODIUM CHLORIDE: 0.9 INJECTION, SOLUTION INTRAVENOUS at 09:07

## 2021-07-20 RX ADMIN — CEFTRIAXONE 1 G: 1 INJECTION, SOLUTION INTRAVENOUS at 03:07

## 2021-07-20 RX ADMIN — HYDRALAZINE HYDROCHLORIDE 20 MG: 20 INJECTION INTRAMUSCULAR; INTRAVENOUS at 12:07

## 2021-07-20 RX ADMIN — SODIUM CHLORIDE: 0.9 INJECTION, SOLUTION INTRAVENOUS at 04:07

## 2021-07-20 RX ADMIN — MORPHINE SULFATE 4 MG: 4 INJECTION INTRAVENOUS at 01:07

## 2021-07-20 RX ADMIN — ASPIRIN 81 MG: 81 TABLET, COATED ORAL at 09:07

## 2021-07-20 RX ADMIN — ATORVASTATIN CALCIUM 10 MG: 10 TABLET, FILM COATED ORAL at 03:07

## 2021-07-20 RX ADMIN — OXYCODONE HYDROCHLORIDE 10 MG: 5 TABLET ORAL at 03:07

## 2021-07-20 RX ADMIN — PROMETHAZINE HYDROCHLORIDE 12.5 MG: 25 INJECTION INTRAMUSCULAR; INTRAVENOUS at 02:07

## 2021-07-21 LAB
ANION GAP SERPL CALC-SCNC: 11 MMOL/L (ref 8–16)
BACTERIA #/AREA URNS HPF: NORMAL /HPF
BACTERIA UR CULT: NORMAL
BACTERIA UR CULT: NORMAL
BASOPHILS # BLD AUTO: 0.05 K/UL (ref 0–0.2)
BASOPHILS NFR BLD: 0.5 % (ref 0–1.9)
BILIRUB UR QL STRIP: NEGATIVE
BNP SERPL-MCNC: 34 PG/ML (ref 0–99)
BUN SERPL-MCNC: 16 MG/DL (ref 8–23)
CALCIUM SERPL-MCNC: 8.5 MG/DL (ref 8.7–10.5)
CHLORIDE SERPL-SCNC: 106 MMOL/L (ref 95–110)
CLARITY UR: CLEAR
CO2 SERPL-SCNC: 23 MMOL/L (ref 23–29)
COLOR UR: YELLOW
CREAT SERPL-MCNC: 0.8 MG/DL (ref 0.5–1.4)
DIFFERENTIAL METHOD: ABNORMAL
EOSINOPHIL # BLD AUTO: 0.3 K/UL (ref 0–0.5)
EOSINOPHIL NFR BLD: 2.5 % (ref 0–8)
ERYTHROCYTE [DISTWIDTH] IN BLOOD BY AUTOMATED COUNT: 14.1 % (ref 11.5–14.5)
EST. GFR  (AFRICAN AMERICAN): >60 ML/MIN/1.73 M^2
EST. GFR  (NON AFRICAN AMERICAN): >60 ML/MIN/1.73 M^2
GLUCOSE SERPL-MCNC: 84 MG/DL (ref 70–110)
GLUCOSE UR QL STRIP: NEGATIVE
HCT VFR BLD AUTO: 37.5 % (ref 37–48.5)
HGB BLD-MCNC: 11.7 G/DL (ref 12–16)
HGB UR QL STRIP: NEGATIVE
IMM GRANULOCYTES # BLD AUTO: 0.06 K/UL (ref 0–0.04)
IMM GRANULOCYTES NFR BLD AUTO: 0.6 % (ref 0–0.5)
KETONES UR QL STRIP: NEGATIVE
LEUKOCYTE ESTERASE UR QL STRIP: ABNORMAL
LYMPHOCYTES # BLD AUTO: 2.6 K/UL (ref 1–4.8)
LYMPHOCYTES NFR BLD: 25.7 % (ref 18–48)
MAGNESIUM SERPL-MCNC: 1.7 MG/DL (ref 1.6–2.6)
MCH RBC QN AUTO: 29.5 PG (ref 27–31)
MCHC RBC AUTO-ENTMCNC: 31.2 G/DL (ref 32–36)
MCV RBC AUTO: 95 FL (ref 82–98)
MICROSCOPIC COMMENT: NORMAL
MONOCYTES # BLD AUTO: 0.8 K/UL (ref 0.3–1)
MONOCYTES NFR BLD: 7.5 % (ref 4–15)
NEUTROPHILS # BLD AUTO: 6.3 K/UL (ref 1.8–7.7)
NEUTROPHILS NFR BLD: 63.2 % (ref 38–73)
NITRITE UR QL STRIP: NEGATIVE
NRBC BLD-RTO: 0 /100 WBC
PH UR STRIP: 6 [PH] (ref 5–8)
PHOSPHATE SERPL-MCNC: 3.4 MG/DL (ref 2.7–4.5)
PLATELET # BLD AUTO: 260 K/UL (ref 150–450)
PMV BLD AUTO: 10.1 FL (ref 9.2–12.9)
POTASSIUM SERPL-SCNC: 3.8 MMOL/L (ref 3.5–5.1)
PROT UR QL STRIP: NEGATIVE
RBC # BLD AUTO: 3.96 M/UL (ref 4–5.4)
SODIUM SERPL-SCNC: 140 MMOL/L (ref 136–145)
SP GR UR STRIP: 1.01 (ref 1–1.03)
URN SPEC COLLECT METH UR: ABNORMAL
UROBILINOGEN UR STRIP-ACNC: NEGATIVE EU/DL
WBC # BLD AUTO: 9.99 K/UL (ref 3.9–12.7)
WBC #/AREA URNS HPF: 3 /HPF (ref 0–5)

## 2021-07-21 PROCEDURE — 27000221 HC OXYGEN, UP TO 24 HOURS

## 2021-07-21 PROCEDURE — 97530 THERAPEUTIC ACTIVITIES: CPT

## 2021-07-21 PROCEDURE — 96372 THER/PROPH/DIAG INJ SC/IM: CPT | Mod: 59

## 2021-07-21 PROCEDURE — 97161 PT EVAL LOW COMPLEX 20 MIN: CPT

## 2021-07-21 PROCEDURE — 96361 HYDRATE IV INFUSION ADD-ON: CPT

## 2021-07-21 PROCEDURE — 97110 THERAPEUTIC EXERCISES: CPT

## 2021-07-21 PROCEDURE — 63600175 PHARM REV CODE 636 W HCPCS: Performed by: INTERNAL MEDICINE

## 2021-07-21 PROCEDURE — 25000003 PHARM REV CODE 250: Performed by: INTERNAL MEDICINE

## 2021-07-21 PROCEDURE — 81000 URINALYSIS NONAUTO W/SCOPE: CPT | Performed by: INTERNAL MEDICINE

## 2021-07-21 PROCEDURE — G0378 HOSPITAL OBSERVATION PER HR: HCPCS

## 2021-07-21 PROCEDURE — 97166 OT EVAL MOD COMPLEX 45 MIN: CPT

## 2021-07-21 PROCEDURE — 85025 COMPLETE CBC W/AUTO DIFF WBC: CPT | Performed by: NURSE PRACTITIONER

## 2021-07-21 PROCEDURE — 80048 BASIC METABOLIC PNL TOTAL CA: CPT | Performed by: NURSE PRACTITIONER

## 2021-07-21 PROCEDURE — 83880 ASSAY OF NATRIURETIC PEPTIDE: CPT | Performed by: INTERNAL MEDICINE

## 2021-07-21 PROCEDURE — 94761 N-INVAS EAR/PLS OXIMETRY MLT: CPT

## 2021-07-21 PROCEDURE — 84100 ASSAY OF PHOSPHORUS: CPT | Performed by: INTERNAL MEDICINE

## 2021-07-21 PROCEDURE — 83735 ASSAY OF MAGNESIUM: CPT | Performed by: INTERNAL MEDICINE

## 2021-07-21 PROCEDURE — 36415 COLL VENOUS BLD VENIPUNCTURE: CPT | Performed by: NURSE PRACTITIONER

## 2021-07-21 PROCEDURE — 96376 TX/PRO/DX INJ SAME DRUG ADON: CPT

## 2021-07-21 RX ORDER — ALBUTEROL SULFATE 90 UG/1
2 AEROSOL, METERED RESPIRATORY (INHALATION) EVERY 6 HOURS PRN
Status: DISCONTINUED | OUTPATIENT
Start: 2021-07-21 | End: 2021-07-21

## 2021-07-21 RX ORDER — ALBUTEROL SULFATE 0.83 MG/ML
2.5 SOLUTION RESPIRATORY (INHALATION) EVERY 6 HOURS PRN
Status: DISCONTINUED | OUTPATIENT
Start: 2021-07-21 | End: 2021-07-22 | Stop reason: HOSPADM

## 2021-07-21 RX ORDER — POLYETHYLENE GLYCOL 3350 17 G/17G
17 POWDER, FOR SOLUTION ORAL DAILY
Status: DISCONTINUED | OUTPATIENT
Start: 2021-07-21 | End: 2021-07-22 | Stop reason: HOSPADM

## 2021-07-21 RX ADMIN — TAMSULOSIN HYDROCHLORIDE 0.4 MG: 0.4 CAPSULE ORAL at 09:07

## 2021-07-21 RX ADMIN — OXYCODONE HYDROCHLORIDE 10 MG: 5 TABLET ORAL at 09:07

## 2021-07-21 RX ADMIN — POLYETHYLENE GLYCOL 3350 17 G: 17 POWDER, FOR SOLUTION ORAL at 09:07

## 2021-07-21 RX ADMIN — CEFTRIAXONE 2 G: 2 INJECTION, SOLUTION INTRAVENOUS at 09:07

## 2021-07-21 RX ADMIN — OXYBUTYNIN CHLORIDE 5 MG: 5 TABLET ORAL at 08:07

## 2021-07-21 RX ADMIN — QUETIAPINE 400 MG: 100 TABLET ORAL at 08:07

## 2021-07-21 RX ADMIN — OXYCODONE HYDROCHLORIDE 10 MG: 5 TABLET ORAL at 04:07

## 2021-07-21 RX ADMIN — OXYCODONE HYDROCHLORIDE 10 MG: 5 TABLET ORAL at 03:07

## 2021-07-21 RX ADMIN — ATORVASTATIN CALCIUM 10 MG: 10 TABLET, FILM COATED ORAL at 08:07

## 2021-07-21 RX ADMIN — OXYBUTYNIN CHLORIDE 5 MG: 5 TABLET ORAL at 09:07

## 2021-07-21 RX ADMIN — OXYCODONE HYDROCHLORIDE 10 MG: 5 TABLET ORAL at 10:07

## 2021-07-21 RX ADMIN — PROMETHAZINE HYDROCHLORIDE 12.5 MG: 25 INJECTION INTRAMUSCULAR; INTRAVENOUS at 06:07

## 2021-07-21 RX ADMIN — ENOXAPARIN SODIUM 40 MG: 40 INJECTION SUBCUTANEOUS at 05:07

## 2021-07-21 RX ADMIN — ASPIRIN 81 MG: 81 TABLET, COATED ORAL at 09:07

## 2021-07-21 RX ADMIN — PROMETHAZINE HYDROCHLORIDE 12.5 MG: 25 INJECTION INTRAMUSCULAR; INTRAVENOUS at 09:07

## 2021-07-22 VITALS
HEIGHT: 68 IN | RESPIRATION RATE: 18 BRPM | DIASTOLIC BLOOD PRESSURE: 84 MMHG | BODY MASS INDEX: 31.37 KG/M2 | HEART RATE: 91 BPM | OXYGEN SATURATION: 95 % | SYSTOLIC BLOOD PRESSURE: 181 MMHG | WEIGHT: 207 LBS | TEMPERATURE: 98 F

## 2021-07-22 PROCEDURE — 63600175 PHARM REV CODE 636 W HCPCS: Performed by: INTERNAL MEDICINE

## 2021-07-22 PROCEDURE — 96376 TX/PRO/DX INJ SAME DRUG ADON: CPT

## 2021-07-22 PROCEDURE — 25000003 PHARM REV CODE 250: Performed by: INTERNAL MEDICINE

## 2021-07-22 PROCEDURE — G0378 HOSPITAL OBSERVATION PER HR: HCPCS

## 2021-07-22 PROCEDURE — 27000221 HC OXYGEN, UP TO 24 HOURS

## 2021-07-22 PROCEDURE — 94761 N-INVAS EAR/PLS OXIMETRY MLT: CPT

## 2021-07-22 RX ORDER — QUETIAPINE FUMARATE 400 MG/1
400 TABLET, FILM COATED ORAL NIGHTLY
Qty: 30 TABLET | Refills: 0 | Status: SHIPPED | OUTPATIENT
Start: 2021-07-22 | End: 2023-09-28

## 2021-07-22 RX ORDER — CEFUROXIME AXETIL 500 MG/1
500 TABLET ORAL 2 TIMES DAILY
Qty: 10 TABLET | Refills: 0 | Status: SHIPPED | OUTPATIENT
Start: 2021-07-22 | End: 2021-07-27

## 2021-07-22 RX ADMIN — TAMSULOSIN HYDROCHLORIDE 0.4 MG: 0.4 CAPSULE ORAL at 09:07

## 2021-07-22 RX ADMIN — CEFTRIAXONE 2 G: 2 INJECTION, SOLUTION INTRAVENOUS at 09:07

## 2021-07-22 RX ADMIN — OXYBUTYNIN CHLORIDE 5 MG: 5 TABLET ORAL at 09:07

## 2021-07-22 RX ADMIN — POLYETHYLENE GLYCOL 3350 17 G: 17 POWDER, FOR SOLUTION ORAL at 09:07

## 2021-07-22 RX ADMIN — PROMETHAZINE HYDROCHLORIDE 12.5 MG: 25 INJECTION INTRAMUSCULAR; INTRAVENOUS at 03:07

## 2021-07-22 RX ADMIN — ASPIRIN 81 MG: 81 TABLET, COATED ORAL at 09:07

## 2021-07-22 RX ADMIN — OXYCODONE HYDROCHLORIDE 10 MG: 5 TABLET ORAL at 03:07

## 2021-07-25 LAB
BACTERIA BLD CULT: NORMAL
BACTERIA BLD CULT: NORMAL

## 2021-07-26 ENCOUNTER — OFFICE VISIT (OUTPATIENT)
Dept: UROLOGY | Facility: CLINIC | Age: 71
End: 2021-07-26
Payer: MEDICARE

## 2021-07-26 VITALS
HEIGHT: 68 IN | HEART RATE: 94 BPM | WEIGHT: 207 LBS | SYSTOLIC BLOOD PRESSURE: 118 MMHG | BODY MASS INDEX: 31.37 KG/M2 | DIASTOLIC BLOOD PRESSURE: 74 MMHG

## 2021-07-26 DIAGNOSIS — N20.0 NEPHROLITHIASIS: Primary | ICD-10-CM

## 2021-07-26 DIAGNOSIS — N31.9 NEUROGENIC BLADDER: ICD-10-CM

## 2021-07-26 PROCEDURE — 3288F PR FALLS RISK ASSESSMENT DOCUMENTED: ICD-10-PCS | Mod: CPTII,S$GLB,, | Performed by: UROLOGY

## 2021-07-26 PROCEDURE — 99214 PR OFFICE/OUTPT VISIT, EST, LEVL IV, 30-39 MIN: ICD-10-PCS | Mod: S$GLB,,, | Performed by: UROLOGY

## 2021-07-26 PROCEDURE — 1160F PR REVIEW ALL MEDS BY PRESCRIBER/CLIN PHARMACIST DOCUMENTED: ICD-10-PCS | Mod: CPTII,S$GLB,, | Performed by: UROLOGY

## 2021-07-26 PROCEDURE — 3288F FALL RISK ASSESSMENT DOCD: CPT | Mod: CPTII,S$GLB,, | Performed by: UROLOGY

## 2021-07-26 PROCEDURE — 1159F MED LIST DOCD IN RCRD: CPT | Mod: CPTII,S$GLB,, | Performed by: UROLOGY

## 2021-07-26 PROCEDURE — 3008F BODY MASS INDEX DOCD: CPT | Mod: CPTII,S$GLB,, | Performed by: UROLOGY

## 2021-07-26 PROCEDURE — 99999 PR PBB SHADOW E&M-EST. PATIENT-LVL IV: CPT | Mod: PBBFAC,,, | Performed by: UROLOGY

## 2021-07-26 PROCEDURE — 1160F RVW MEDS BY RX/DR IN RCRD: CPT | Mod: CPTII,S$GLB,, | Performed by: UROLOGY

## 2021-07-26 PROCEDURE — 1125F PR PAIN SEVERITY QUANTIFIED, PAIN PRESENT: ICD-10-PCS | Mod: CPTII,S$GLB,, | Performed by: UROLOGY

## 2021-07-26 PROCEDURE — 3008F PR BODY MASS INDEX (BMI) DOCUMENTED: ICD-10-PCS | Mod: CPTII,S$GLB,, | Performed by: UROLOGY

## 2021-07-26 PROCEDURE — 99214 OFFICE O/P EST MOD 30 MIN: CPT | Mod: S$GLB,,, | Performed by: UROLOGY

## 2021-07-26 PROCEDURE — 1101F PR PT FALLS ASSESS DOC 0-1 FALLS W/OUT INJ PAST YR: ICD-10-PCS | Mod: CPTII,S$GLB,, | Performed by: UROLOGY

## 2021-07-26 PROCEDURE — 1125F AMNT PAIN NOTED PAIN PRSNT: CPT | Mod: CPTII,S$GLB,, | Performed by: UROLOGY

## 2021-07-26 PROCEDURE — 1101F PT FALLS ASSESS-DOCD LE1/YR: CPT | Mod: CPTII,S$GLB,, | Performed by: UROLOGY

## 2021-07-26 PROCEDURE — 1159F PR MEDICATION LIST DOCUMENTED IN MEDICAL RECORD: ICD-10-PCS | Mod: CPTII,S$GLB,, | Performed by: UROLOGY

## 2021-07-26 PROCEDURE — 99999 PR PBB SHADOW E&M-EST. PATIENT-LVL IV: ICD-10-PCS | Mod: PBBFAC,,, | Performed by: UROLOGY

## 2021-07-26 RX ORDER — LISINOPRIL 10 MG/1
10 TABLET ORAL DAILY
COMMUNITY
End: 2023-09-26

## 2021-07-26 RX ORDER — MIRTAZAPINE 30 MG/1
30 TABLET, FILM COATED ORAL NIGHTLY
COMMUNITY
End: 2023-09-28

## 2021-08-04 ENCOUNTER — TELEPHONE (OUTPATIENT)
Dept: UROLOGY | Facility: CLINIC | Age: 71
End: 2021-08-04

## 2021-08-04 ENCOUNTER — TELEPHONE (OUTPATIENT)
Dept: TRANSPLANT | Facility: CLINIC | Age: 71
End: 2021-08-04

## 2021-08-13 ENCOUNTER — TELEPHONE (OUTPATIENT)
Dept: UROLOGY | Facility: CLINIC | Age: 71
End: 2021-08-13

## 2021-08-26 ENCOUNTER — TELEPHONE (OUTPATIENT)
Dept: UROLOGY | Facility: CLINIC | Age: 71
End: 2021-08-26

## 2021-09-02 ENCOUNTER — TELEPHONE (OUTPATIENT)
Dept: UROLOGY | Facility: CLINIC | Age: 71
End: 2021-09-02

## 2021-11-01 ENCOUNTER — TELEPHONE (OUTPATIENT)
Dept: UROLOGY | Facility: CLINIC | Age: 71
End: 2021-11-01
Payer: MEDICARE

## 2021-11-02 ENCOUNTER — TELEPHONE (OUTPATIENT)
Dept: UROLOGY | Facility: CLINIC | Age: 71
End: 2021-11-02
Payer: MEDICARE

## 2021-11-05 ENCOUNTER — PATIENT OUTREACH (OUTPATIENT)
Dept: ADMINISTRATIVE | Facility: CLINIC | Age: 71
End: 2021-11-05
Payer: MEDICARE

## 2021-11-05 ENCOUNTER — EXTERNAL HOSPITAL ADMISSION (OUTPATIENT)
Dept: ADMINISTRATIVE | Facility: CLINIC | Age: 71
End: 2021-11-05
Payer: MEDICARE

## 2021-11-10 ENCOUNTER — OFFICE VISIT (OUTPATIENT)
Dept: UROLOGY | Facility: CLINIC | Age: 71
End: 2021-11-10
Payer: MEDICARE

## 2021-11-10 VITALS — BODY MASS INDEX: 31.47 KG/M2 | WEIGHT: 207 LBS

## 2021-11-10 DIAGNOSIS — Z93.59 SUPRAPUBIC CATHETER: ICD-10-CM

## 2021-11-10 DIAGNOSIS — N20.0 NEPHROLITHIASIS: Primary | ICD-10-CM

## 2021-11-10 DIAGNOSIS — N31.9 NEUROGENIC BLADDER: ICD-10-CM

## 2021-11-10 PROCEDURE — 4010F ACE/ARB THERAPY RXD/TAKEN: CPT | Mod: CPTII,S$GLB,, | Performed by: NURSE PRACTITIONER

## 2021-11-10 PROCEDURE — 1126F AMNT PAIN NOTED NONE PRSNT: CPT | Mod: CPTII,S$GLB,, | Performed by: NURSE PRACTITIONER

## 2021-11-10 PROCEDURE — 3008F BODY MASS INDEX DOCD: CPT | Mod: CPTII,S$GLB,, | Performed by: NURSE PRACTITIONER

## 2021-11-10 PROCEDURE — 1126F PR PAIN SEVERITY QUANTIFIED, NO PAIN PRESENT: ICD-10-PCS | Mod: CPTII,S$GLB,, | Performed by: NURSE PRACTITIONER

## 2021-11-10 PROCEDURE — 99999 PR PBB SHADOW E&M-EST. PATIENT-LVL IV: CPT | Mod: PBBFAC,,, | Performed by: NURSE PRACTITIONER

## 2021-11-10 PROCEDURE — 3008F PR BODY MASS INDEX (BMI) DOCUMENTED: ICD-10-PCS | Mod: CPTII,S$GLB,, | Performed by: NURSE PRACTITIONER

## 2021-11-10 PROCEDURE — 1159F PR MEDICATION LIST DOCUMENTED IN MEDICAL RECORD: ICD-10-PCS | Mod: CPTII,S$GLB,, | Performed by: NURSE PRACTITIONER

## 2021-11-10 PROCEDURE — 99999 PR PBB SHADOW E&M-EST. PATIENT-LVL IV: ICD-10-PCS | Mod: PBBFAC,,, | Performed by: NURSE PRACTITIONER

## 2021-11-10 PROCEDURE — 1160F RVW MEDS BY RX/DR IN RCRD: CPT | Mod: CPTII,S$GLB,, | Performed by: NURSE PRACTITIONER

## 2021-11-10 PROCEDURE — 99215 OFFICE O/P EST HI 40 MIN: CPT | Mod: S$GLB,,, | Performed by: NURSE PRACTITIONER

## 2021-11-10 PROCEDURE — 1160F PR REVIEW ALL MEDS BY PRESCRIBER/CLIN PHARMACIST DOCUMENTED: ICD-10-PCS | Mod: CPTII,S$GLB,, | Performed by: NURSE PRACTITIONER

## 2021-11-10 PROCEDURE — 4010F PR ACE/ARB THEARPY RXD/TAKEN: ICD-10-PCS | Mod: CPTII,S$GLB,, | Performed by: NURSE PRACTITIONER

## 2021-11-10 PROCEDURE — 1159F MED LIST DOCD IN RCRD: CPT | Mod: CPTII,S$GLB,, | Performed by: NURSE PRACTITIONER

## 2021-11-10 PROCEDURE — 99215 PR OFFICE/OUTPT VISIT, EST, LEVL V, 40-54 MIN: ICD-10-PCS | Mod: S$GLB,,, | Performed by: NURSE PRACTITIONER

## 2021-11-10 RX ORDER — METHENAMINE HIPPURATE 1000 MG/1
1 TABLET ORAL 2 TIMES DAILY
Qty: 60 TABLET | Refills: 11 | Status: SHIPPED | OUTPATIENT
Start: 2021-11-10 | End: 2023-09-28

## 2021-11-10 RX ORDER — ESTRADIOL 0.1 MG/G
CREAM VAGINAL
Qty: 8 G | Refills: 11 | Status: SHIPPED | OUTPATIENT
Start: 2021-11-10 | End: 2023-07-31

## 2021-11-24 ENCOUNTER — EXTERNAL HOSPITAL ADMISSION (OUTPATIENT)
Dept: ADMINISTRATIVE | Facility: CLINIC | Age: 71
End: 2021-11-24
Payer: MEDICARE

## 2021-11-24 ENCOUNTER — PATIENT OUTREACH (OUTPATIENT)
Dept: ADMINISTRATIVE | Facility: CLINIC | Age: 71
End: 2021-11-24
Payer: MEDICARE

## 2022-05-01 NOTE — PROGRESS NOTES
Care Management Initial Consult    General Information  Assessment completed with: Children,  (Endy Bazzi)  Type of CM/SW Visit: Initial Assessment    Primary Care Provider verified and updated as needed:     Readmission within the last 30 days:        Reason for Consult: discharge planning  Advance Care Planning: Advance Care Planning Reviewed:  (has a POLST)          Communication Assessment  Patient's communication style: spoken language (English or Bilingual)    Hearing Difficulty or Deaf: no   Wear Glasses or Blind: no    Cognitive  Cognitive/Neuro/Behavioral: WDL  Level of Consciousness: alert  Arousal Level: opens eyes spontaneously  Orientation: oriented x 4  Mood/Behavior: calm, cooperative  Best Language: 0 - No aphasia  Speech: slow, clear    Living Environment:   People in home: facility resident     Current living Arrangements: extended care facility  Name of Facility:  ([facility])   Able to return to prior arrangements:         Family/Social Support:  Care provided by:    Provides care for: no one  Marital Status:   Children          Description of Support System: Supportive, Involved    Support Assessment: Adequate family and caregiver support, Adequate social supports    Current Resources:   Patient receiving home care services: No     Community Resources: None  Equipment currently used at home: walker, rolling  Supplies currently used at home: None    Employment/Financial:  Employment Status: retired        Financial Concerns: No concerns identified           Lifestyle & Psychosocial Needs:  Social Determinants of Health     Tobacco Use: Medium Risk     Smoking Tobacco Use: Former Smoker     Smokeless Tobacco Use: Never Used   Alcohol Use: Not on file   Financial Resource Strain: Not on file   Food Insecurity: Not on file   Transportation Needs: Not on file   Physical Activity: Not on file   Stress: Not on file   Social Connections: Not on file   Intimate Partner Violence: Not  HH re-certification with Mary , Dr. Miguel Verduzco. Patient received SN services.   on file   Depression: At risk     PHQ-2 Score: 3   Housing Stability: Not on file       Functional Status:  Prior to admission patient needed assistance:              Mental Health Status:          Chemical Dependency Status:                Values/Beliefs:  Spiritual, Cultural Beliefs, Sikhism Practices, Values that affect care: no               Additional Information:  Per care management/social work consult for discharge planning.  Patient was admitted on 4-27-22 after a fall causing right hip pain.  The tentative date of discharge is yet to be determined.  Reviewed chart.  Patient was admitted from [facility].  Initial referral sent to [facility] to check the bed hold status and to begin the discharge planning process.  Call placed to patient's son Oscar to inquires as to whether he would like patient to return there on discharge.  Patient's son states that he would like for patient to return to [facility] on discharge.      Will continue to follow.      AQUILINO Salgado, [facility]    [phone]  [facility]

## 2022-07-19 ENCOUNTER — TELEPHONE (OUTPATIENT)
Dept: UROLOGY | Facility: CLINIC | Age: 72
End: 2022-07-19
Payer: MEDICARE

## 2022-07-19 ENCOUNTER — NURSE TRIAGE (OUTPATIENT)
Dept: ADMINISTRATIVE | Facility: CLINIC | Age: 72
End: 2022-07-19
Payer: MEDICARE

## 2022-07-19 NOTE — TELEPHONE ENCOUNTER
----- Message from Briana Weinberg sent at 7/19/2022  1:35 PM CDT -----  Contact: Desirae  Patient would like a call back at [phone] in regards to her catheter.      Thanks

## 2022-07-19 NOTE — TELEPHONE ENCOUNTER
Pt reports she has a suprapubic catheter and for the last 5 nights it has leaked all over her bed, but not during the day, Pt advised to be seen within 24 hours per protocol, pt states she does have Home Health nurse coming out tomorrow who can evaluate it as well. Pt encouraged to call back with any worsening symptoms or questions and verbalized understanding.    Reason for Disposition   [1] Catheter is broken or cracked AND [2] is still usable    Additional Information   Negative: Shock suspected (e.g., cold/pale/clammy skin, too weak to stand, low BP, rapid pulse)   Negative: Sounds like a life-threatening emergency to the triager   Negative: [1] Catheter was accidentally pulled-out AND [2] bright red continuous bleeding   Negative: SEVERE abdominal pain   Negative: Fever > 100.4 F (38.0 C)   Negative: [1] Drinking very little AND [2] dehydration suspected (e.g., no urine > 12 hours, very dry mouth, very lightheaded)   Negative: Patient sounds very sick or weak to the triager   Negative: Catheter was accidentally pulled-out   Negative: [1] Catheter is broken AND [2] is not usable   Negative: Bleeding around catheter (e.g., from penis or female urethra)   Negative: Lower abdominal pain or distention   Negative: [1] No urine in bag > 4 hours AND [2] catheter is not kinked   Negative: [1] Tea-colored or slightly red urine lasts > 24 hours AND [2] not cleared by increasing fluid intake    AND [3] no recent prostate or bladder surgery   Negative: [1] Cloudy urine lasts > 24 hours AND [2] not cleared by increasing fluid intake   Negative: [1] Bloody or red-colored urine AND [2] no recent prostate or bladder surgery  (Exception: brief episode and urine now clear)   Negative: [1] Bloody or red-colored urine AND [2] prostate or bladder surgery > 3 days (72 hours) ago   Negative: Urine smells bad    Protocols used: URINARY CATHETER SYMPTOMS AND [id number]

## 2022-07-21 ENCOUNTER — TELEPHONE (OUTPATIENT)
Dept: UROLOGY | Facility: CLINIC | Age: 72
End: 2022-07-21
Payer: MEDICARE

## 2022-07-21 NOTE — TELEPHONE ENCOUNTER
Tried calling pt phone rang once and went to     ----- Message from Lana Lea sent at 7/21/2022  8:01 AM CDT -----  Contact: Desirae Merrill would like a call back at [phone], Regards to she is leaving out of town today and wants to speak with the office regards to some problems she had  overnight.    Thanks  Td

## 2022-07-22 ENCOUNTER — TELEPHONE (OUTPATIENT)
Dept: UROLOGY | Facility: CLINIC | Age: 72
End: 2022-07-22
Payer: MEDICARE

## 2022-07-22 RX ORDER — CEFDINIR 300 MG/1
300 CAPSULE ORAL 2 TIMES DAILY
Qty: 20 CAPSULE | Refills: 0 | Status: SHIPPED | OUTPATIENT
Start: 2022-07-22 | End: 2022-08-01

## 2022-07-22 NOTE — TELEPHONE ENCOUNTER
----- Message from Karla Rios sent at 7/22/2022  8:14 AM CDT -----  Contact: pt  Type:  Needs Medical Advice    Who Called: pt  Symptoms (please be specific): Bladder pain  How long has patient had these symptoms: n/a  Pharmacy name and phone #: n/a  Would the patient rather a call back or a response via [facility]? Call back  Best Call Back Number: [phone]  Additional Information: n/a

## 2022-07-25 ENCOUNTER — TELEPHONE (OUTPATIENT)
Dept: UROLOGY | Facility: CLINIC | Age: 72
End: 2022-07-25
Payer: MEDICARE

## 2022-07-25 NOTE — TELEPHONE ENCOUNTER
Called pt and got her an appt with  Jonna for tomorrow. She states she is in a lot of pain.    ----- Message from Kathy Rojas sent at 7/25/2022 12:11 PM CDT -----  PLEASE CALL PT [phone] (home)    PT WAS RETURNING A MISSED CALL

## 2022-07-25 NOTE — TELEPHONE ENCOUNTER
Tried to calling pt but no answer.    ----- Message from Antonette Rodríguez sent at 7/25/2022  9:03 AM CDT -----  Contact: [phone]  Patient would like to consult with a nurse in regards to her ongoing pain and also her appt. Please call back at [phone]. Thanks r/s

## 2022-07-27 ENCOUNTER — OFFICE VISIT (OUTPATIENT)
Dept: UROLOGY | Facility: CLINIC | Age: 72
End: 2022-07-27
Payer: MEDICARE

## 2022-07-27 DIAGNOSIS — Z93.59 SUPRAPUBIC CATHETER: ICD-10-CM

## 2022-07-27 DIAGNOSIS — N30.00 ACUTE CYSTITIS WITHOUT HEMATURIA: Primary | ICD-10-CM

## 2022-07-27 DIAGNOSIS — N22 CALCULUS OF URINARY TRACT IN DISEASES CLASSIFIED ELSEWHERE: ICD-10-CM

## 2022-07-27 DIAGNOSIS — N31.9 NEUROGENIC BLADDER: ICD-10-CM

## 2022-07-27 PROBLEM — G89.29 CHRONIC LOW BACK PAIN: Status: RESOLVED | Noted: 2020-01-27 | Resolved: 2022-07-27

## 2022-07-27 PROBLEM — M54.50 CHRONIC LOW BACK PAIN: Status: RESOLVED | Noted: 2020-01-27 | Resolved: 2022-07-27

## 2022-07-27 PROCEDURE — 99999 PR PBB SHADOW E&M-EST. PATIENT-LVL II: ICD-10-PCS | Mod: PBBFAC,,, | Performed by: NURSE PRACTITIONER

## 2022-07-27 PROCEDURE — 87086 URINE CULTURE/COLONY COUNT: CPT | Performed by: NURSE PRACTITIONER

## 2022-07-27 PROCEDURE — 99214 OFFICE O/P EST MOD 30 MIN: CPT | Mod: S$GLB,,, | Performed by: NURSE PRACTITIONER

## 2022-07-27 PROCEDURE — 1101F PT FALLS ASSESS-DOCD LE1/YR: CPT | Mod: CPTII,S$GLB,, | Performed by: NURSE PRACTITIONER

## 2022-07-27 PROCEDURE — 3288F FALL RISK ASSESSMENT DOCD: CPT | Mod: CPTII,S$GLB,, | Performed by: NURSE PRACTITIONER

## 2022-07-27 PROCEDURE — 3288F PR FALLS RISK ASSESSMENT DOCUMENTED: ICD-10-PCS | Mod: CPTII,S$GLB,, | Performed by: NURSE PRACTITIONER

## 2022-07-27 PROCEDURE — 1101F PR PT FALLS ASSESS DOC 0-1 FALLS W/OUT INJ PAST YR: ICD-10-PCS | Mod: CPTII,S$GLB,, | Performed by: NURSE PRACTITIONER

## 2022-07-27 PROCEDURE — 99214 PR OFFICE/OUTPT VISIT, EST, LEVL IV, 30-39 MIN: ICD-10-PCS | Mod: S$GLB,,, | Performed by: NURSE PRACTITIONER

## 2022-07-27 PROCEDURE — 99999 PR PBB SHADOW E&M-EST. PATIENT-LVL II: CPT | Mod: PBBFAC,,, | Performed by: NURSE PRACTITIONER

## 2022-07-27 NOTE — PROGRESS NOTES
Chief Complaint:   Neurogenic bladder  Suprapubic catheter  Urinary tract infection  History of renal stones    HPI:   Patient is a 71-year-old female that is presenting with an acute onset of dysuria and left flank pain.  Patient states that over the last 7 days she has had dysuria and left flank pain that has increased in intensity.  Denies gross hematuria, does admit to low-grade fever.  Patient has a neurogenic bladder and currently has a suprapubic catheter.      Allergies:  Nucynta [tapentadol], Prochlorperazine, Stadol [butorphanol tartrate], Ibuprofen, Metoclopramide, Reglan [metoclopramide hcl], Toradol [ketorolac], and Zofran [ondansetron hcl (pf)]    Medications:  has a current medication list which includes the following prescription(s): albuterol, aspirin, atorvastatin, belbuca, cefdinir, chlorhexidine, clindamycin, clonazepam, clotrimazole-betamethasone 1-0.05%, estradiol, lisinopril, methenamine, mirtazapine, mupirocin, oxybutynin, oxycodone, polyethylene glycol, promethazine, promethazine, promethazine, tizanidine, xtampza er, quetiapine, and tamsulosin.    Review of Systems:  General: No fever, chills, fatigability, or weight loss.  Skin: No rashes, itching, or changes in color or texture of skin.  Chest: Denies JACKMAN, cyanosis, wheezing, cough, and sputum production.  Abdomen: Appetite fine. No weight loss. Denies diarrhea, abdominal pain, hematemesis, or blood in stool.  Musculoskeletal: Flank pain  : As above.  All other review of systems negative.    PMH:   has a past medical history of DDD (degenerative disc disease), lumbar (4/11/2014), Hypertension, Neurogenic bladder, and Pulmonary embolism (4/11/2014).    PSH:   has a past surgical history that includes Cholecystectomy; Joint replacement; Cervical fusion; neck fusion; Hysterectomy (1974); Incision and drainage of abscess (Right, 10/16/2018); Augmentation of breast; Oophorectomy; Insertion of suprapubic catheter; and Back surgery.    FamHx:  family history includes Stroke in her father.    SocHx:  reports that she has never smoked. She has never used smokeless tobacco. She reports that she does not drink alcohol and does not use drugs.      Physical Exam:  General: A&Ox3, no apparent distress, no deformities  Neck: No masses, normal thyroid  Lungs: normal inspiration, no use of accessory muscles  Heart: normal pulse, no arrhythmias  Abdomen: Soft, NT, ND, no masses, no hernias, no hepatosplenomegaly  Impression/Plan:   1. Possible urinary tract infection  Urine culture was sent  Patient was empirically treated, 2 days ago,with Omnicef.  Suprapubic catheter was changed with the last 72 hours.    2. History of renal stones and flank pain  Will proceed with CT scan renal stone study and call her with urine culture and CT scan results.

## 2022-07-29 LAB — BACTERIA UR CULT: NO GROWTH

## 2022-08-02 ENCOUNTER — HOSPITAL ENCOUNTER (OUTPATIENT)
Dept: RADIOLOGY | Facility: HOSPITAL | Age: 72
Discharge: HOME | End: 2022-08-02
Attending: NURSE PRACTITIONER
Payer: MEDICARE

## 2022-08-02 DIAGNOSIS — N22 CALCULUS OF URINARY TRACT IN DISEASES CLASSIFIED ELSEWHERE: ICD-10-CM

## 2022-08-02 PROCEDURE — 74176 CT ABD & PELVIS W/O CONTRAST: CPT | Mod: TC

## 2022-08-02 PROCEDURE — 74176 CT ABD & PELVIS W/O CONTRAST: CPT | Mod: 26,,, | Performed by: RADIOLOGY

## 2022-08-02 PROCEDURE — 74176 CT RENAL STONE STUDY ABD PELVIS WO: ICD-10-PCS | Mod: 26,,, | Performed by: RADIOLOGY

## 2022-08-19 ENCOUNTER — TELEPHONE (OUTPATIENT)
Dept: UROLOGY | Facility: CLINIC | Age: 72
End: 2022-08-19
Payer: MEDICARE

## 2023-07-03 ENCOUNTER — LAB VISIT (OUTPATIENT)
Dept: LAB | Facility: HOSPITAL | Age: 73
End: 2023-07-03
Attending: INTERNAL MEDICINE
Payer: MEDICARE

## 2023-07-03 DIAGNOSIS — T83.511D URINARY TRACT INFECTION ASSOCIATED WITH CATHETERIZATION OF URINARY TRACT, SUBSEQUENT ENCOUNTER: ICD-10-CM

## 2023-07-03 DIAGNOSIS — N39.0 URINARY TRACT INFECTION ASSOCIATED WITH CATHETERIZATION OF URINARY TRACT, SUBSEQUENT ENCOUNTER: ICD-10-CM

## 2023-07-03 LAB
ANION GAP SERPL CALC-SCNC: 13 MMOL/L (ref 8–16)
BASOPHILS # BLD AUTO: 0.02 K/UL (ref 0–0.2)
BASOPHILS NFR BLD: 0.3 % (ref 0–1.9)
BUN SERPL-MCNC: 18 MG/DL (ref 8–23)
CALCIUM SERPL-MCNC: 9.1 MG/DL (ref 8.7–10.5)
CHLORIDE SERPL-SCNC: 102 MMOL/L (ref 95–110)
CO2 SERPL-SCNC: 22 MMOL/L (ref 23–29)
CREAT SERPL-MCNC: 0.9 MG/DL (ref 0.5–1.4)
DIFFERENTIAL METHOD: ABNORMAL
EOSINOPHIL # BLD AUTO: 0.7 K/UL (ref 0–0.5)
EOSINOPHIL NFR BLD: 10.3 % (ref 0–8)
ERYTHROCYTE [DISTWIDTH] IN BLOOD BY AUTOMATED COUNT: 13.2 % (ref 11.5–14.5)
EST. GFR  (NO RACE VARIABLE): >60 ML/MIN/1.73 M^2
GLUCOSE SERPL-MCNC: 84 MG/DL (ref 70–110)
HCT VFR BLD AUTO: 38 % (ref 37–48.5)
HGB BLD-MCNC: 11.6 G/DL (ref 12–16)
IMM GRANULOCYTES # BLD AUTO: 0.02 K/UL (ref 0–0.04)
IMM GRANULOCYTES NFR BLD AUTO: 0.3 % (ref 0–0.5)
LYMPHOCYTES # BLD AUTO: 1.9 K/UL (ref 1–4.8)
LYMPHOCYTES NFR BLD: 27.3 % (ref 18–48)
MCH RBC QN AUTO: 29.4 PG (ref 27–31)
MCHC RBC AUTO-ENTMCNC: 30.5 G/DL (ref 32–36)
MCV RBC AUTO: 96 FL (ref 82–98)
MONOCYTES # BLD AUTO: 0.5 K/UL (ref 0.3–1)
MONOCYTES NFR BLD: 7.6 % (ref 4–15)
NEUTROPHILS # BLD AUTO: 3.7 K/UL (ref 1.8–7.7)
NEUTROPHILS NFR BLD: 54.2 % (ref 38–73)
NRBC BLD-RTO: 0 /100 WBC
PLATELET # BLD AUTO: 290 K/UL (ref 150–450)
PMV BLD AUTO: 9.8 FL (ref 9.2–12.9)
POTASSIUM SERPL-SCNC: 5.4 MMOL/L (ref 3.5–5.1)
RBC # BLD AUTO: 3.94 M/UL (ref 4–5.4)
SODIUM SERPL-SCNC: 137 MMOL/L (ref 136–145)
WBC # BLD AUTO: 6.82 K/UL (ref 3.9–12.7)

## 2023-07-03 PROCEDURE — 80048 BASIC METABOLIC PNL TOTAL CA: CPT | Performed by: INTERNAL MEDICINE

## 2023-07-03 PROCEDURE — 85025 COMPLETE CBC W/AUTO DIFF WBC: CPT | Performed by: INTERNAL MEDICINE

## 2023-07-12 NOTE — PLAN OF CARE
Problem: Patient Care Overview  Goal: Plan of Care Review  Pt has been free from falls, injury or trauma this shift.  POC reviewed with Pt.  Bed low and locked.  Call light within reach.  Pt has received Tylenol for pain and tolerated well.  Pt has been turned q 2 hours and turns well with encouragement.         Provider: DR. MAJOR  Caller: MAXIMO BURKETT  Relationship to Patient: SELF  Phone Number: [phone]  Reason for Call: PATIENT CALLED REQUESTING A DOCTOR'S NOTE FOR TODAY'S APPOINTMENT 07/12/23 AND THE REMAINDER OF THE WEEK IF POSSIBLE. PATIENT RETURNING TO WORK MONDAY 07/17/23. PLEASE ADVISE.

## 2023-07-31 ENCOUNTER — OFFICE VISIT (OUTPATIENT)
Dept: UROLOGY | Facility: CLINIC | Age: 73
End: 2023-07-31
Payer: MEDICARE

## 2023-07-31 VITALS
BODY MASS INDEX: 31.37 KG/M2 | HEART RATE: 101 BPM | RESPIRATION RATE: 18 BRPM | HEIGHT: 68 IN | SYSTOLIC BLOOD PRESSURE: 155 MMHG | WEIGHT: 207 LBS | DIASTOLIC BLOOD PRESSURE: 86 MMHG

## 2023-07-31 DIAGNOSIS — N31.9 NEUROGENIC BLADDER: Primary | ICD-10-CM

## 2023-07-31 DIAGNOSIS — N30.00 ACUTE CYSTITIS WITHOUT HEMATURIA: ICD-10-CM

## 2023-07-31 DIAGNOSIS — N20.0 NEPHROLITHIASIS: ICD-10-CM

## 2023-07-31 PROCEDURE — 99214 OFFICE O/P EST MOD 30 MIN: CPT | Mod: S$GLB,,, | Performed by: UROLOGY

## 2023-07-31 PROCEDURE — 3288F PR FALLS RISK ASSESSMENT DOCUMENTED: ICD-10-PCS | Mod: CPTII,S$GLB,, | Performed by: UROLOGY

## 2023-07-31 PROCEDURE — 3077F SYST BP >= 140 MM HG: CPT | Mod: CPTII,S$GLB,, | Performed by: UROLOGY

## 2023-07-31 PROCEDURE — 3077F PR MOST RECENT SYSTOLIC BLOOD PRESSURE >= 140 MM HG: ICD-10-PCS | Mod: CPTII,S$GLB,, | Performed by: UROLOGY

## 2023-07-31 PROCEDURE — 99999 PR PBB SHADOW E&M-EST. PATIENT-LVL V: ICD-10-PCS | Mod: PBBFAC,,, | Performed by: UROLOGY

## 2023-07-31 PROCEDURE — 3288F FALL RISK ASSESSMENT DOCD: CPT | Mod: CPTII,S$GLB,, | Performed by: UROLOGY

## 2023-07-31 PROCEDURE — 3008F BODY MASS INDEX DOCD: CPT | Mod: CPTII,S$GLB,, | Performed by: UROLOGY

## 2023-07-31 PROCEDURE — 1126F PR PAIN SEVERITY QUANTIFIED, NO PAIN PRESENT: ICD-10-PCS | Mod: CPTII,S$GLB,, | Performed by: UROLOGY

## 2023-07-31 PROCEDURE — 1126F AMNT PAIN NOTED NONE PRSNT: CPT | Mod: CPTII,S$GLB,, | Performed by: UROLOGY

## 2023-07-31 PROCEDURE — 3008F PR BODY MASS INDEX (BMI) DOCUMENTED: ICD-10-PCS | Mod: CPTII,S$GLB,, | Performed by: UROLOGY

## 2023-07-31 PROCEDURE — 99214 PR OFFICE/OUTPT VISIT, EST, LEVL IV, 30-39 MIN: ICD-10-PCS | Mod: S$GLB,,, | Performed by: UROLOGY

## 2023-07-31 PROCEDURE — 1160F PR REVIEW ALL MEDS BY PRESCRIBER/CLIN PHARMACIST DOCUMENTED: ICD-10-PCS | Mod: CPTII,S$GLB,, | Performed by: UROLOGY

## 2023-07-31 PROCEDURE — 1101F PR PT FALLS ASSESS DOC 0-1 FALLS W/OUT INJ PAST YR: ICD-10-PCS | Mod: CPTII,S$GLB,, | Performed by: UROLOGY

## 2023-07-31 PROCEDURE — 4010F ACE/ARB THERAPY RXD/TAKEN: CPT | Mod: CPTII,S$GLB,, | Performed by: UROLOGY

## 2023-07-31 PROCEDURE — 1160F RVW MEDS BY RX/DR IN RCRD: CPT | Mod: CPTII,S$GLB,, | Performed by: UROLOGY

## 2023-07-31 PROCEDURE — 3079F PR MOST RECENT DIASTOLIC BLOOD PRESSURE 80-89 MM HG: ICD-10-PCS | Mod: CPTII,S$GLB,, | Performed by: UROLOGY

## 2023-07-31 PROCEDURE — 4010F PR ACE/ARB THEARPY RXD/TAKEN: ICD-10-PCS | Mod: CPTII,S$GLB,, | Performed by: UROLOGY

## 2023-07-31 PROCEDURE — 3079F DIAST BP 80-89 MM HG: CPT | Mod: CPTII,S$GLB,, | Performed by: UROLOGY

## 2023-07-31 PROCEDURE — 1101F PT FALLS ASSESS-DOCD LE1/YR: CPT | Mod: CPTII,S$GLB,, | Performed by: UROLOGY

## 2023-07-31 PROCEDURE — 1159F PR MEDICATION LIST DOCUMENTED IN MEDICAL RECORD: ICD-10-PCS | Mod: CPTII,S$GLB,, | Performed by: UROLOGY

## 2023-07-31 PROCEDURE — 1159F MED LIST DOCD IN RCRD: CPT | Mod: CPTII,S$GLB,, | Performed by: UROLOGY

## 2023-07-31 PROCEDURE — 99999 PR PBB SHADOW E&M-EST. PATIENT-LVL V: CPT | Mod: PBBFAC,,, | Performed by: UROLOGY

## 2023-07-31 RX ORDER — ESTRADIOL 0.1 MG/G
2 CREAM VAGINAL
Qty: 60 G | Refills: 5 | Status: SHIPPED | OUTPATIENT
Start: 2023-07-31 | End: 2024-07-30

## 2023-07-31 RX ORDER — CEFDINIR 300 MG/1
300 CAPSULE ORAL 2 TIMES DAILY
Qty: 30 CAPSULE | Refills: 0 | Status: SHIPPED | OUTPATIENT
Start: 2023-07-31 | End: 2023-08-10

## 2023-07-31 NOTE — PROGRESS NOTES
Chief Complaint:   Encounter Diagnoses   Name Primary?    Neurogenic bladder Yes    Nephrolithiasis     Acute cystitis without hematuria          HPI:   7/31/23- patient is here today for follow-up, it has been some time since I have seen her.  She continues to have persistent infections despite all conservative measures.  She is tried multiple antibiotic courses and they continue to fail.  She also has significant GI disturbances.  Patient currently is getting her suprapubic catheter changed every 2 weeks.    6/29/21-  70-year-old female with no previous history of kidney stone disease.  Reports the emergency department with 4-5 day history of upper abdominal discomfort and nausea and vomiting.  Patient states this is what brought her to the emergency department, incidentally she was found to have a right 3 mm distal ureteral stone.  Patient has significant urological history, having a chronic suprapubic tube for years now due to neurogenic bladder.  This is changed every 2 weeks.  It is understood that this was due to her previous back surgeries.  No other urological history, except for intermittent infections due to the chronic catheterization.  Patient is otherwise asymptomatic in regards to the stone today, no subjective fevers.    Allergies:  Nucynta [tapentadol], Prochlorperazine, Stadol [butorphanol tartrate], Ibuprofen, Metoclopramide, Reglan [metoclopramide hcl], Toradol [ketorolac], and Zofran [ondansetron hcl (pf)]    Medications:  has a current medication list which includes the following prescription(s): aspirin, lisinopril, methenamine, oxybutynin, oxycodone, promethazine, promethazine, promethazine, tizanidine, albuterol, atorvastatin, belbuca, chlorhexidine, clindamycin, clonazepam, clotrimazole-betamethasone 1-0.05%, estradiol, mirtazapine, mupirocin, polyethylene glycol, quetiapine, tamsulosin, and xtampza er.    Review of Systems:  General: No fever, chills, fatigability, or weight loss.  Skin:  No rashes, itching, or changes in color or texture of skin.  Chest: Denies JACKMAN, cyanosis, wheezing, cough, and sputum production.  Abdomen: Appetite fine. No weight loss. Denies diarrhea, abdominal pain, hematemesis, or blood in stool.  Musculoskeletal: No joint stiffness or swelling. Denies back pain.  : As above.  All other review of systems negative.    PMH:   has a past medical history of DDD (degenerative disc disease), lumbar (4/11/2014), Hypertension, Neurogenic bladder, and Pulmonary embolism (4/11/2014).    PSH:   has a past surgical history that includes Cholecystectomy; Joint replacement; Cervical fusion; neck fusion; Hysterectomy (1974); Incision and drainage of abscess (Right, 10/16/2018); Augmentation of breast; Oophorectomy; Insertion of suprapubic catheter; and Back surgery.    FamHx: family history includes Stroke in her father.    SocHx:  reports that she has never smoked. She has never used smokeless tobacco. She reports that she does not drink alcohol and does not use drugs.      Physical Exam:  Vitals:    07/31/23 1603   BP: (!) 155/86   Pulse: 101   Resp: 18     General: A&Ox3, no apparent distress, no deformities  Neck: No masses, normal ROM  Lungs: normal inspiration, no use of accessory muscles  Heart: normal pulse, no arrhythmias  Abdomen: Soft, NT, ND, no masses, no hernias, no hepatosplenomegaly, no costovertebral angle tenderness  Skin: The skin is warm and dry. No jaundice.  Ext: No c/c/e.     Labs/Studies:   CT OSH SPT in place, possible small bilateral renal stones 6/23  CT stone protocol small left renal stone, possible right UVJ vs bladder stone, SPT in position 7/21  Creatinine 1.06 7/23       Impression/Plan:        1. Right ureteral stone- no evidence of acute stone pain, last CT demonstrated small stones nonobstructing.  Call with stone pain.     2. Neurogenic bladder- chronic suprapubic catheter changed every 2 weeks.    3. UTI- at this point she is taking the Hiprex, I would  like for her to continue this but add Estrace.  In addition will consult GI due to significant constipation, as I do believe that this is exacerbating her infections.  Will go ahead and treat with cefdinir daily for 30 days, she is currently completing a b.i.d. dosing for 7.  See me back in 6 weeks with cystoscopy.  If this fails to alleviate UTIs, she may pursue Betadine washes.  Call with any complaints in the meantime.

## 2023-09-26 PROCEDURE — 93010 EKG 12-LEAD: ICD-10-PCS | Mod: ,,, | Performed by: INTERNAL MEDICINE

## 2023-09-26 PROCEDURE — 93010 ELECTROCARDIOGRAM REPORT: CPT | Mod: ,,, | Performed by: INTERNAL MEDICINE

## 2023-09-28 ENCOUNTER — HOSPITAL ENCOUNTER (INPATIENT)
Facility: HOSPITAL | Age: 73
LOS: 2 days | Discharge: HOME HEALTH | DRG: 698 | End: 2023-10-01
Attending: EMERGENCY MEDICINE | Admitting: HOSPITALIST
Payer: MEDICARE

## 2023-09-28 DIAGNOSIS — R79.89 ELEVATED TROPONIN: ICD-10-CM

## 2023-09-28 DIAGNOSIS — N39.0 URINARY TRACT INFECTION ASSOCIATED WITH CATHETERIZATION OF URINARY TRACT, UNSPECIFIED INDWELLING URINARY CATHETER TYPE, INITIAL ENCOUNTER: ICD-10-CM

## 2023-09-28 DIAGNOSIS — I21.4 NSTEMI (NON-ST ELEVATED MYOCARDIAL INFARCTION): ICD-10-CM

## 2023-09-28 DIAGNOSIS — D72.829 LEUKOCYTOSIS, UNSPECIFIED TYPE: ICD-10-CM

## 2023-09-28 DIAGNOSIS — R52 PAIN: ICD-10-CM

## 2023-09-28 DIAGNOSIS — T83.511A URINARY TRACT INFECTION ASSOCIATED WITH CATHETERIZATION OF URINARY TRACT, UNSPECIFIED INDWELLING URINARY CATHETER TYPE, INITIAL ENCOUNTER: ICD-10-CM

## 2023-09-28 DIAGNOSIS — A41.9 SEVERE SEPSIS: Primary | ICD-10-CM

## 2023-09-28 DIAGNOSIS — R65.20 SEVERE SEPSIS: Primary | ICD-10-CM

## 2023-09-28 DIAGNOSIS — N17.9 ACUTE RENAL FAILURE, UNSPECIFIED ACUTE RENAL FAILURE TYPE: ICD-10-CM

## 2023-09-28 DIAGNOSIS — M79.601 RIGHT ARM PAIN: ICD-10-CM

## 2023-09-28 DIAGNOSIS — R41.82 ALTERED MENTAL STATUS: ICD-10-CM

## 2023-09-28 LAB
ALBUMIN SERPL BCP-MCNC: 3.9 G/DL (ref 3.5–5.2)
ALP SERPL-CCNC: 85 U/L (ref 55–135)
ALT SERPL W/O P-5'-P-CCNC: 31 U/L (ref 10–44)
ANION GAP SERPL CALC-SCNC: 13 MMOL/L (ref 8–16)
AST SERPL-CCNC: 49 U/L (ref 10–40)
BACTERIA #/AREA URNS HPF: ABNORMAL /HPF
BASOPHILS # BLD AUTO: 0.02 K/UL (ref 0–0.2)
BASOPHILS NFR BLD: 0.1 % (ref 0–1.9)
BILIRUB SERPL-MCNC: 0.7 MG/DL (ref 0.1–1)
BILIRUB UR QL STRIP: NEGATIVE
BUN SERPL-MCNC: 39 MG/DL (ref 8–23)
CALCIUM SERPL-MCNC: 9.7 MG/DL (ref 8.7–10.5)
CHLORIDE SERPL-SCNC: 109 MMOL/L (ref 95–110)
CLARITY UR: ABNORMAL
CO2 SERPL-SCNC: 18 MMOL/L (ref 23–29)
COLOR UR: YELLOW
CREAT SERPL-MCNC: 3.7 MG/DL (ref 0.5–1.4)
DIFFERENTIAL METHOD: ABNORMAL
EOSINOPHIL # BLD AUTO: 0.1 K/UL (ref 0–0.5)
EOSINOPHIL NFR BLD: 0.3 % (ref 0–8)
ERYTHROCYTE [DISTWIDTH] IN BLOOD BY AUTOMATED COUNT: 13.6 % (ref 11.5–14.5)
EST. GFR  (NO RACE VARIABLE): 12 ML/MIN/1.73 M^2
GLUCOSE SERPL-MCNC: 118 MG/DL (ref 70–110)
GLUCOSE UR QL STRIP: NEGATIVE
HCT VFR BLD AUTO: 46.6 % (ref 37–48.5)
HGB BLD-MCNC: 14.7 G/DL (ref 12–16)
HGB UR QL STRIP: ABNORMAL
HYALINE CASTS #/AREA URNS LPF: 0 /LPF
IMM GRANULOCYTES # BLD AUTO: 0.14 K/UL (ref 0–0.04)
IMM GRANULOCYTES NFR BLD AUTO: 0.8 % (ref 0–0.5)
KETONES UR QL STRIP: NEGATIVE
LACTATE SERPL-SCNC: 1 MMOL/L (ref 0.5–2.2)
LACTATE SERPL-SCNC: 1.7 MMOL/L (ref 0.5–2.2)
LEUKOCYTE ESTERASE UR QL STRIP: ABNORMAL
LYMPHOCYTES # BLD AUTO: 1.6 K/UL (ref 1–4.8)
LYMPHOCYTES NFR BLD: 8.6 % (ref 18–48)
MCH RBC QN AUTO: 29.9 PG (ref 27–31)
MCHC RBC AUTO-ENTMCNC: 31.5 G/DL (ref 32–36)
MCV RBC AUTO: 95 FL (ref 82–98)
MICROSCOPIC COMMENT: ABNORMAL
MONOCYTES # BLD AUTO: 1.7 K/UL (ref 0.3–1)
MONOCYTES NFR BLD: 9.2 % (ref 4–15)
NEUTROPHILS # BLD AUTO: 15 K/UL (ref 1.8–7.7)
NEUTROPHILS NFR BLD: 81 % (ref 38–73)
NITRITE UR QL STRIP: NEGATIVE
NRBC BLD-RTO: 0 /100 WBC
PH UR STRIP: 6 [PH] (ref 5–8)
PLATELET # BLD AUTO: 257 K/UL (ref 150–450)
PMV BLD AUTO: 9.1 FL (ref 9.2–12.9)
POTASSIUM SERPL-SCNC: 4.2 MMOL/L (ref 3.5–5.1)
PROCALCITONIN SERPL IA-MCNC: 0.22 NG/ML
PROT SERPL-MCNC: 7.9 G/DL (ref 6–8.4)
PROT UR QL STRIP: ABNORMAL
RBC # BLD AUTO: 4.91 M/UL (ref 4–5.4)
RBC #/AREA URNS HPF: 9 /HPF (ref 0–4)
SODIUM SERPL-SCNC: 140 MMOL/L (ref 136–145)
SP GR UR STRIP: 1.02 (ref 1–1.03)
TROPONIN I SERPL DL<=0.01 NG/ML-MCNC: 4.91 NG/ML (ref 0–0.03)
TROPONIN I SERPL DL<=0.01 NG/ML-MCNC: 6.18 NG/ML (ref 0–0.03)
URN SPEC COLLECT METH UR: ABNORMAL
UROBILINOGEN UR STRIP-ACNC: NEGATIVE EU/DL
WBC # BLD AUTO: 18.54 K/UL (ref 3.9–12.7)
WBC #/AREA URNS HPF: 64 /HPF (ref 0–5)
WBC CLUMPS URNS QL MICRO: ABNORMAL
YEAST URNS QL MICRO: ABNORMAL

## 2023-09-28 PROCEDURE — 80053 COMPREHEN METABOLIC PANEL: CPT | Performed by: EMERGENCY MEDICINE

## 2023-09-28 PROCEDURE — 99285 EMERGENCY DEPT VISIT HI MDM: CPT | Mod: 25

## 2023-09-28 PROCEDURE — 36415 COLL VENOUS BLD VENIPUNCTURE: CPT | Performed by: HOSPITALIST

## 2023-09-28 PROCEDURE — 96372 THER/PROPH/DIAG INJ SC/IM: CPT | Performed by: HOSPITALIST

## 2023-09-28 PROCEDURE — 81000 URINALYSIS NONAUTO W/SCOPE: CPT | Performed by: EMERGENCY MEDICINE

## 2023-09-28 PROCEDURE — G0378 HOSPITAL OBSERVATION PER HR: HCPCS

## 2023-09-28 PROCEDURE — 83605 ASSAY OF LACTIC ACID: CPT | Performed by: EMERGENCY MEDICINE

## 2023-09-28 PROCEDURE — 84484 ASSAY OF TROPONIN QUANT: CPT | Mod: 91 | Performed by: HOSPITALIST

## 2023-09-28 PROCEDURE — 87086 URINE CULTURE/COLONY COUNT: CPT | Performed by: EMERGENCY MEDICINE

## 2023-09-28 PROCEDURE — 96365 THER/PROPH/DIAG IV INF INIT: CPT

## 2023-09-28 PROCEDURE — 63600175 PHARM REV CODE 636 W HCPCS: Performed by: EMERGENCY MEDICINE

## 2023-09-28 PROCEDURE — 84484 ASSAY OF TROPONIN QUANT: CPT | Performed by: EMERGENCY MEDICINE

## 2023-09-28 PROCEDURE — 25000003 PHARM REV CODE 250: Performed by: EMERGENCY MEDICINE

## 2023-09-28 PROCEDURE — 96361 HYDRATE IV INFUSION ADD-ON: CPT

## 2023-09-28 PROCEDURE — 85025 COMPLETE CBC W/AUTO DIFF WBC: CPT | Performed by: EMERGENCY MEDICINE

## 2023-09-28 PROCEDURE — 87040 BLOOD CULTURE FOR BACTERIA: CPT | Performed by: EMERGENCY MEDICINE

## 2023-09-28 PROCEDURE — 63600175 PHARM REV CODE 636 W HCPCS: Performed by: HOSPITALIST

## 2023-09-28 PROCEDURE — 84145 PROCALCITONIN (PCT): CPT | Performed by: EMERGENCY MEDICINE

## 2023-09-28 RX ORDER — ENOXAPARIN SODIUM 100 MG/ML
30 INJECTION SUBCUTANEOUS EVERY 24 HOURS
Status: DISCONTINUED | OUTPATIENT
Start: 2023-09-28 | End: 2023-09-30

## 2023-09-28 RX ORDER — IBUPROFEN 200 MG
24 TABLET ORAL
Status: DISCONTINUED | OUTPATIENT
Start: 2023-09-28 | End: 2023-10-01 | Stop reason: HOSPADM

## 2023-09-28 RX ORDER — GLUCAGON 1 MG
1 KIT INJECTION
Status: DISCONTINUED | OUTPATIENT
Start: 2023-09-28 | End: 2023-10-01 | Stop reason: HOSPADM

## 2023-09-28 RX ORDER — ASPIRIN 325 MG
325 TABLET ORAL
Status: COMPLETED | OUTPATIENT
Start: 2023-09-28 | End: 2023-09-28

## 2023-09-28 RX ORDER — ACETAMINOPHEN 325 MG/1
650 TABLET ORAL EVERY 4 HOURS PRN
Status: DISCONTINUED | OUTPATIENT
Start: 2023-09-28 | End: 2023-10-01 | Stop reason: HOSPADM

## 2023-09-28 RX ORDER — IBUPROFEN 200 MG
16 TABLET ORAL
Status: DISCONTINUED | OUTPATIENT
Start: 2023-09-28 | End: 2023-10-01 | Stop reason: HOSPADM

## 2023-09-28 RX ORDER — POLYETHYLENE GLYCOL 3350 17 G/17G
17 POWDER, FOR SOLUTION ORAL DAILY PRN
Status: DISCONTINUED | OUTPATIENT
Start: 2023-09-28 | End: 2023-10-01 | Stop reason: HOSPADM

## 2023-09-28 RX ORDER — ACETAMINOPHEN 325 MG/1
650 TABLET ORAL EVERY 8 HOURS PRN
Status: DISCONTINUED | OUTPATIENT
Start: 2023-09-28 | End: 2023-10-01 | Stop reason: HOSPADM

## 2023-09-28 RX ADMIN — ENOXAPARIN SODIUM 30 MG: 30 INJECTION SUBCUTANEOUS at 09:09

## 2023-09-28 RX ADMIN — ASPIRIN 325 MG ORAL TABLET 325 MG: 325 PILL ORAL at 04:09

## 2023-09-28 RX ADMIN — SODIUM CHLORIDE 1917 ML: 9 INJECTION, SOLUTION INTRAVENOUS at 05:09

## 2023-09-28 RX ADMIN — CEFTRIAXONE 1 G: 1 INJECTION, POWDER, FOR SOLUTION INTRAMUSCULAR; INTRAVENOUS at 04:09

## 2023-09-28 NOTE — Clinical Note
Diagnosis: Severe sepsis [064888]   Future Attending Provider: ALO VERA [[id number]]   Admitting Provider:: ALO VERA. [[id number]]

## 2023-09-28 NOTE — ED PROVIDER NOTES
SCRIBE #1 NOTE: I, Lamont Reveles, am scribing for, and in the presence of, Dustni Goins Jr., MD. I have scribed the HPI, ROS, and PEx.     SCRIBE #2 NOTE: I, Shelby Chopra, am scribing for, and in the presence of,  Megan Redmond DO. I have scribed the remaining portions of the note not scribed by Scribe #1.     History      Chief Complaint   Patient presents with    Altered Mental Status     Pt. Presents to ED via [facility] due to being altered. Per EMS pt has been altered since Tuesday when she accidentally Od'd. Pt has left sided weakness with right arm pain, and redness.        Review of patient's allergies indicates:   Allergen Reactions    Nucynta [tapentadol] Nausea And Vomiting    Prochlorperazine Nausea And Vomiting    Stadol [butorphanol tartrate] Nausea And Vomiting    Ibuprofen     Metoclopramide     Reglan [metoclopramide hcl]     Toradol [ketorolac]     Zofran [ondansetron hcl (pf)]         HPI   HPI    9/28/2023, 2:46 PM   History obtained from [facility]  HPI and ROS limited secondary to pt's AMS      History of Present Illness: Desirae No is a 73 y.o. female patient who presents to the Emergency Department for AMS, onset 2 days PTA. Pt presented to the ED 2 days ago following a syncopal episode/possible overdose. Pt was dx with a UTI and discharged on cefdinir, but pt has been altered since. Symptoms are constant and moderate in severity. No mitigating or exacerbating factors reported. No associated sxs reported. No further complaints or concerns at this time.  Last known well time was 2 days ago.  Some question of possible right-sided weakness per the ambulance    [facility]    PCP: Iram Gomez NP       Past Medical History:  Past Medical History:   Diagnosis Date    DDD (degenerative disc disease), lumbar 4/11/2014    Hypertension     Neurogenic bladder     Pulmonary embolism 4/11/2014       Past Surgical History:  Past Surgical History:   Procedure Laterality Date    AUGMENTATION OF  BREAST      BACK SURGERY      CERVICAL FUSION      CHOLECYSTECTOMY      HYSTERECTOMY  1974    menorrhagia    INCISION AND DRAINAGE OF ABSCESS Right 10/16/2018    Procedure: INCISION AND DRAINAGE, ABSCESS;  Surgeon: Miguel Verduzco MD;  Location: [facility];  Service: General;  Laterality: Right;    INSERTION OF SUPRAPUBIC CATHETER      JOINT REPLACEMENT      neck fusion      OOPHORECTOMY           Family History:  Family History   Problem Relation Age of Onset    Stroke Father        Social History:  Social History     Tobacco Use    Smoking status: Never    Smokeless tobacco: Never   Substance and Sexual Activity    Alcohol use: No    Drug use: No    Sexual activity: Yes     Partners: Male     Birth control/protection: None, Surgical       ROS   Review of Systems   Unable to perform ROS: Mental status change   Psychiatric/Behavioral:  Positive for confusion.      Physical Exam      Initial Vitals   BP Pulse Resp Temp SpO2   09/28/23 1454 09/28/23 1453 09/28/23 1454 09/28/23 1453 09/28/23 1454   134/74 82 18 99.8 °F (37.7 °C) 96 %      MAP       --                 Physical Exam  Nursing Notes and Vital Signs Reviewed.  Constitutional: Patient is in no acute distress. Well-developed and well-nourished.  Head: Atraumatic. Normocephalic.  Eyes:  EOM intact.  No scleral icterus.  ENT: Mucous membranes are moist.  Nares clear   Neck:  Full ROM. No JVD.  Cardiovascular: Regular rate. Regular rhythm No murmurs, rubs, or gallops. Distal pulses are 2+ and symmetric  Pulmonary/Chest: No respiratory distress. Clear to auscultation bilaterally. No wheezing or rales.  Equal chest wall rise bilaterally  Abdominal: Soft and non-distended.  There is no tenderness.  No rebound, guarding, or rigidity. Good bowel sounds.  Genitourinary: No CVA tenderness.  No suprapubic tenderness  Musculoskeletal: Moves all extremities. No obvious deformities.  5 x 5 strength in all extremities   Skin: Warm and dry.  Neurological:  Awake. Two through  12 intact bilaterally.  Patient is confused from baseline.  She actively moves all extremities  ED Course    Critical Care    Date/Time: 9/28/2023 7:06 PM    Performed by: Megan Redmond DO  Authorized by: Megan Redmond,   Direct patient critical care time: 16 minutes  Ordering / reviewing critical care time: 5 minutes  Documentation critical care time: 7 minutes  Consulting other physicians critical care time: 7 minutes  Other critical care time: 10 minutes  Total critical care time (exclusive of procedural time) : 45 minutes  Critical care time was exclusive of separately billable procedures and treating other patients and teaching time.  Critical care was necessary to treat or prevent imminent or life-threatening deterioration of the following conditions: sepsis.  Critical care was time spent personally by me on the following activities: discussions with consultants, evaluation of patient's response to treatment, examination of patient, obtaining history from patient or surrogate, ordering and performing treatments and interventions, ordering and review of laboratory studies, ordering and review of radiographic studies, pulse oximetry, re-evaluation of patient's condition and development of treatment plan with patient or surrogate.        ED Vital Signs:  Vitals:    09/28/23 1453 09/28/23 1454 09/28/23 1606 09/28/23 1615   BP:  134/74  119/60   Pulse: 82 79  77   Resp:  18  18   Temp: 99.8 °F (37.7 °C)   98.7 °F (37.1 °C)   TempSrc: Oral      SpO2:  96%  96%   Weight:   97.6 kg (215 lb 2.7 oz)    Height:        09/28/23 1720 09/28/23 1745 09/28/23 1815 09/28/23 1905   BP: 109/61 133/60 (!) 117/51 135/60   Pulse: 77 79 79 83   Resp: 18 20 20 (!) 24   Temp: 98.9 °F (37.2 °C)      TempSrc: Oral      SpO2:  96% 95% 95%   Weight:       Height:        09/28/23 2029 09/29/23 0017   BP: (!) 115/58 (!) 122/59   Pulse: 88 89   Resp: 18 18   Temp: 100 °F (37.8 °C) 99.6 °F (37.6 °C)   TempSrc: Oral Oral   SpO2: (!)  "93% 95%   Weight: 97.8 kg (215 lb 9.8 oz)    Height: 5' 7" (1.702 m)        Abnormal Lab Results:  Labs Reviewed   CBC W/ AUTO DIFFERENTIAL - Abnormal; Notable for the following components:       Result Value    WBC 18.54 (*)     MCHC 31.5 (*)     MPV 9.1 (*)     Immature Granulocytes 0.8 (*)     Gran # (ANC) 15.0 (*)     Immature Grans (Abs) 0.14 (*)     Mono # 1.7 (*)     Gran % 81.0 (*)     Lymph % 8.6 (*)     All other components within normal limits   COMPREHENSIVE METABOLIC PANEL - Abnormal; Notable for the following components:    CO2 18 (*)     Glucose 118 (*)     BUN 39 (*)     Creatinine 3.7 (*)     AST 49 (*)     eGFR 12 (*)     All other components within normal limits   URINALYSIS, REFLEX TO URINE CULTURE - Abnormal; Notable for the following components:    Appearance, UA Hazy (*)     Protein, UA 2+ (*)     Occult Blood UA 1+ (*)     Leukocytes, UA 3+ (*)     All other components within normal limits    Narrative:     Specimen Source->Urine   TROPONIN I - Abnormal; Notable for the following components:    Troponin I 6.180 (*)     All other components within normal limits   URINALYSIS MICROSCOPIC - Abnormal; Notable for the following components:    RBC, UA 9 (*)     WBC, UA 64 (*)     WBC Clumps, UA Many (*)     Yeast, UA Rare (*)     All other components within normal limits    Narrative:     Specimen Source->Urine   TROPONIN I - Abnormal; Notable for the following components:    Troponin I 4.910 (*)     All other components within normal limits   CULTURE, BLOOD   CULTURE, BLOOD   CULTURE, URINE   LACTIC ACID, PLASMA   PROCALCITONIN   LACTIC ACID, PLASMA   POCT GLUCOSE MONITORING CONTINUOUS        All Lab Results:  Results for orders placed or performed during the hospital encounter of 09/28/23   CBC auto differential   Result Value Ref Range    WBC 18.54 (H) 3.90 - 12.70 K/uL    RBC 4.91 4.00 - 5.40 M/uL    Hemoglobin 14.7 12.0 - 16.0 g/dL    Hematocrit 46.6 37.0 - 48.5 %    MCV 95 82 - 98 fL    MCH " 29.9 27.0 - 31.0 pg    MCHC 31.5 (L) 32.0 - 36.0 g/dL    RDW 13.6 11.5 - 14.5 %    Platelets 257 150 - 450 K/uL    MPV 9.1 (L) 9.2 - 12.9 fL    Immature Granulocytes 0.8 (H) 0.0 - 0.5 %    Gran # (ANC) 15.0 (H) 1.8 - 7.7 K/uL    Immature Grans (Abs) 0.14 (H) 0.00 - 0.04 K/uL    Lymph # 1.6 1.0 - 4.8 K/uL    Mono # 1.7 (H) 0.3 - 1.0 K/uL    Eos # 0.1 0.0 - 0.5 K/uL    Baso # 0.02 0.00 - 0.20 K/uL    nRBC 0 0 /100 WBC    Gran % 81.0 (H) 38.0 - 73.0 %    Lymph % 8.6 (L) 18.0 - 48.0 %    Mono % 9.2 4.0 - 15.0 %    Eosinophil % 0.3 0.0 - 8.0 %    Basophil % 0.1 0.0 - 1.9 %    Differential Method Automated    Comprehensive metabolic panel   Result Value Ref Range    Sodium 140 136 - 145 mmol/L    Potassium 4.2 3.5 - 5.1 mmol/L    Chloride 109 95 - 110 mmol/L    CO2 18 (L) 23 - 29 mmol/L    Glucose 118 (H) 70 - 110 mg/dL    BUN 39 (H) 8 - 23 mg/dL    Creatinine 3.7 (H) 0.5 - 1.4 mg/dL    Calcium 9.7 8.7 - 10.5 mg/dL    Total Protein 7.9 6.0 - 8.4 g/dL    Albumin 3.9 3.5 - 5.2 g/dL    Total Bilirubin 0.7 0.1 - 1.0 mg/dL    Alkaline Phosphatase 85 55 - 135 U/L    AST 49 (H) 10 - 40 U/L    ALT 31 10 - 44 U/L    eGFR 12 (A) >60 mL/min/1.73 m^2    Anion Gap 13 8 - 16 mmol/L   Lactic acid, plasma #1   Result Value Ref Range    Lactate (Lactic Acid) 1.7 0.5 - 2.2 mmol/L   Urinalysis, Reflex to Urine Culture Urine, Clean Catch    Specimen: Urine   Result Value Ref Range    Specimen UA Urine, Clean Catch     Color, UA Yellow Yellow, Straw, Jnaet    Appearance, UA Hazy (A) Clear    pH, UA 6.0 5.0 - 8.0    Specific Gravity, UA 1.020 1.005 - 1.030    Protein, UA 2+ (A) Negative    Glucose, UA Negative Negative    Ketones, UA Negative Negative    Bilirubin (UA) Negative Negative    Occult Blood UA 1+ (A) Negative    Nitrite, UA Negative Negative    Urobilinogen, UA Negative <2.0 EU/dL    Leukocytes, UA 3+ (A) Negative   Troponin I   Result Value Ref Range    Troponin I 6.180 (H) 0.000 - 0.026 ng/mL   Procalcitonin   Result Value Ref  Range    Procalcitonin 0.22 <0.25 ng/mL   Lactic acid, plasma #2   Result Value Ref Range    Lactate (Lactic Acid) 1.0 0.5 - 2.2 mmol/L   Urinalysis Microscopic   Result Value Ref Range    RBC, UA 9 (H) 0 - 4 /hpf    WBC, UA 64 (H) 0 - 5 /hpf    WBC Clumps, UA Many (A) None-Rare    Bacteria Occasional None-Occ /hpf    Yeast, UA Rare (A) None    Hyaline Casts, UA 0 0-1/lpf /lpf    Microscopic Comment SEE COMMENT    Troponin I   Result Value Ref Range    Troponin I 4.910 (H) 0.000 - 0.026 ng/mL         Imaging Results:  Imaging Results              X-Ray Shoulder Complete 2 View Right (Final result)  Result time 09/28/23 19:09:19      Final result by Horacio Lima MD (09/28/23 19:09:19)                   Impression:      No acute process identified.  Multiple chronic findings      Electronically signed by: Horacio Lima  Date:    09/28/2023  Time:    19:09               Narrative:    EXAMINATION:  XR SHOULDER COMPLETE 2 OR MORE VIEWS RIGHT    CLINICAL HISTORY:  Pain, unspecified    TECHNIQUE:  Two or three views of the right shoulder were performed.    COMPARISON:  None    FINDINGS:  No acute fracture or dislocation.  Senescent changes.  Degenerative joint disease.  Cervical hardware                                       X-Ray Humerus 2 View Right (Final result)  Result time 09/28/23 18:40:13      Final result by Horacio Lima MD (09/28/23 18:40:13)                   Impression:      No acute abnormality.      Electronically signed by: Horacio Lima  Date:    09/28/2023  Time:    18:40               Narrative:    EXAMINATION:  XR HUMERUS 2 VIEW RIGHT    CLINICAL HISTORY:  XR HUMERUS 2 VIEW RIGHTPain, unspecified    COMPARISON:  None    FINDINGS:  Multiple radiographic views  were obtained.    No evidence of acute fracture or dislocation.  Decreased bone mineral density.                                       X-Ray Hand 3 view Right (Final result)  Result time 09/28/23 18:32:40      Final result by Horacio Lima  MD (09/28/23 18:32:40)                   Impression:      No definite acute abnormality.  Recommend follow-up if symptoms persist      Electronically signed by: Horacio Lima  Date:    09/28/2023  Time:    18:32               Narrative:    EXAMINATION:  XR HAND COMPLETE 3 VIEW RIGHT    CLINICAL HISTORY:  pain;    TECHNIQUE:  PA, lateral, and oblique views of the right hand were performed.    COMPARISON:  None    FINDINGS:  No acute fracture or dislocation.  Degenerative joint disease.                                       X-Ray Forearm Right (Final result)  Result time 09/28/23 18:30:51      Final result by Horacio Lima MD (09/28/23 18:30:51)                   Impression:      No acute fracture or dislocation      Electronically signed by: Horacio Lima  Date:    09/28/2023  Time:    18:30               Narrative:    EXAMINATION:  XR FOREARM RIGHT    CLINICAL HISTORY:  Pain, unspecified    TECHNIQUE:  AP and lateral views of the right forearm were performed.    COMPARISON:  None    FINDINGS:  No evidence for fracture or dislocation.  Mild degenerative joint disease.                                       US Upper Extremity Veins Right (Final result)  Result time 09/28/23 17:52:29      Final result by Valerie Wong MD (09/28/23 17:52:29)                   Impression:      No thrombus in central veins of the right upper extremity.      Electronically signed by: Valerie Wong  Date:    09/28/2023  Time:    17:52               Narrative:    EXAMINATION:  US UPPER EXTREMITY VEINS RIGHT    CLINICAL HISTORY:  pain;    TECHNIQUE:  Duplex and color flow Doppler evaluation and dynamic compression was performed of the right upper extremity veins.    COMPARISON:  None    FINDINGS:  Central veins: The internal jugular, subclavian, and axillary veins are patent and free of thrombus.    Arm veins: The brachial, basilic, and cephalic veins are patent and compressible.    Other findings: None.                                        CT Head Without Contrast (Final result)  Result time 09/28/23 16:55:30      Final result by Goyo Fernandes MD (Timothy) (09/28/23 16:55:30)                   Impression:      No acute intracranial abnormality.    All CT scans at this facility use dose modulation, iterative reconstructions, and/or weight base dosing when appropriate to reduce radiation dose to as low as reasonably achievable.      Electronically signed by: Goyo Fernandes MD  Date:    09/28/2023  Time:    16:55               Narrative:    EXAMINATION:  CT HEAD WITHOUT CONTRAST    CLINICAL HISTORY:  Mental status change, unknown cause;    TECHNIQUE:  Noncontrast CT was performed    COMPARISON:  CT brain 09/26/2023.    FINDINGS:  No intracranial acute hemorrhage or acute focal brain parenchymal abnormality is identified.  Calvarium is intact.  Stable partial opacification of the right sphenoid sinus.                                       X-Ray Chest AP Portable (Final result)  Result time 09/28/23 15:13:56      Final result by Goyo Fernandes MD (Timothy) (09/28/23 15:13:56)                   Impression:      Clear lungs.      Electronically signed by: Goyo Fernandes MD  Date:    09/28/2023  Time:    15:13               Narrative:    EXAMINATION:  XR CHEST AP PORTABLE    CLINICAL HISTORY:  , Sepsis;    COMPARISON:  Comparison 09/26/2023.    FINDINGS:  One view.  Stable cardiomegaly.  The lung fields are clear. No acute cardiopulmonary infiltrate.                                     The EKG was ordered, reviewed, and independently interpreted by the ED provider.  Interpretation time: 15:59  Rate: 80 BPM  Rhythm: normal sinus rhythm  Interpretation: Possible Anterior infarct, age undetermined. NH interval 150. QRS 80. . No STEMI.             The Emergency Provider reviewed the vital signs and test results, which are outlined above.    ED Discussion     4:00 PM: Dr. Goins transfers care of patient to Dr. Redmond pending lab and  imaging results.    5:12 PM: Re-evaluated pt. Pt is resting comfortably and is in no acute distress. D/w pt's family all pertinent results. D/w pt's family any concerns expressed at this time. Answered all questions. Pt's family expresses understanding at this time.     ED Course as of 09/29/23 0139   u Sep 28, 2023   1535 Cardiac monitor interpretation  Indication altered mental status  Independent interpretation  Normal sinus rhythm rate of 79 no STEMI [RT]   1725 73-year-old female presents with altered mental status that her son noticed today while in the nursing home.  She was seen and evaluated here 2 days ago for an accidental opiate overdose that was reversed with Narcan.  She has a suprapubic indwelling catheter and was found to have a UTI and started on on Omnicef.  Today she is confused compared to baseline and is reporting right arm pain.  She is alert and oriented to person only.  Workup reveals severe sepsis secondary to UTI.  CBC shows leukocytosis with left shift and CMP shows a significant AMANDA, and troponin acutely elevated with a normal EKG.  Blood and urine cultures are pending.  She has received antibiotics and sepsis fluids.  Procalcitonin and lactic acid are normal.  CT head shows no intracranial hemorrhage and chest x-ray is negative for pneumonia.  No focal deficits to indicate acute ischemic stroke.  Patient also has pain with palpation to her right upper extremity.  Images show no acute fractures, dislocations, or retained foreign bodies and ultrasound is negative for DVT. [NF]      ED Course User Index  [NF] Megan Redmond, DO  [RT] Dustin Goins Jr., MD     6:45 PM: Discussed case with Dr. Hawkins ([facility]). Dr. High agrees with current care and management of pt and accepts admission.   Admitting Service: Hospital Medicine  Admitting Physician: Dr. High  Admit to: obs med surg     6:45 PM: Re-evaluated pt. I have discussed test results, shared treatment plan, and the  need for admission with patient and family at bedside. Pt and family express understanding at this time and agree with all information. All questions answered. Pt and family have no further questions or concerns at this time. Pt is ready for admit.    ED Medication(s):  Medications   glucose chewable tablet 16 g (has no administration in time range)   glucose chewable tablet 24 g (has no administration in time range)   glucagon (human recombinant) injection 1 mg (has no administration in time range)   enoxaparin injection 30 mg (30 mg Subcutaneous Given 9/28/23 2110)   cefTRIAXone (ROCEPHIN) 1 g in dextrose 5 % in water (D5W) 100 mL IVPB (MB+) (has no administration in time range)   acetaminophen tablet 650 mg (has no administration in time range)   polyethylene glycol packet 17 g (has no administration in time range)   acetaminophen tablet 650 mg (has no administration in time range)   dextrose 10% bolus 125 mL 125 mL (has no administration in time range)   dextrose 10% bolus 250 mL 250 mL (has no administration in time range)   cefTRIAXone (ROCEPHIN) 1 g in dextrose 5 % in water (D5W) 100 mL IVPB (MB+) (0 g Intravenous Stopped 9/28/23 1717)   aspirin tablet 325 mg (325 mg Oral Given 9/28/23 1615)   sodium chloride 0.9% bolus 1,917 mL 1,917 mL (0 mLs Intravenous Stopped 9/28/23 1823)         Current Discharge Medication List            Medical Decision Making    Medical Decision Making  Amount and/or Complexity of Data Reviewed  External Data Reviewed: notes.  Labs: ordered. Decision-making details documented in ED Course.  Radiology: ordered and independent interpretation performed. Decision-making details documented in ED Course.  ECG/medicine tests: ordered and independent interpretation performed. Decision-making details documented in ED Course.    Risk  OTC drugs.  Prescription drug management.  Decision regarding hospitalization.                Scribe Attestation:   Scribe #1: I performed the above scribed  service and the documentation accurately describes the services I performed. I attest to the accuracy of the note.    Attending:   Physician Attestation Statement for Scribe #1: I, Dustin Goins Jr., MD, personally performed the services described in this documentation, as scribed by Lamont Reveles, in my presence, and it is both accurate and complete.       Scribe Attestation:   Scribe #2: I performed the above scribed service and the documentation accurately describes the services I performed. I attest to the accuracy of the note.    Attending Attestation:           Physician Attestation for Scribe:    Physician Attestation Statement for Scribe #2: I, Megan Redmond DO, reviewed documentation, as scribed by Shelby Chopra in my presence, and it is both accurate and complete. I also acknowledge and confirm the content of the note done by Scribe #1.          Clinical Impression       ICD-10-CM ICD-9-CM   1. Severe sepsis  A41.9 038.9    R65.20 995.92   2. Altered mental status  R41.82 780.97   3. NSTEMI (non-ST elevated myocardial infarction)  I21.4 410.70   4. Leukocytosis, unspecified type  D72.829 288.60   5. Acute renal failure, unspecified acute renal failure type  N17.9 584.9   6. Pain  R52 780.96   7. Urinary tract infection associated with catheterization of urinary tract, unspecified indwelling urinary catheter type, initial encounter  T83.511A 996.64    N39.0 599.0   8. Right arm pain  M79.601 729.5       Disposition:   Disposition: Placed in Observation  Condition: Megan Blackmon DO  09/29/23 0139

## 2023-09-28 NOTE — PHARMACY MED REC
"Admission Medication History     The home medication history was taken by Yony Rios.    You may go to "Admission" then "Reconcile Home Medications" tabs to review and/or act upon these items.     The home medication list has been updated by the Pharmacy department.   Please read ALL comments highlighted in yellow.   Please address this information as you see fit.    Feel free to contact us if you have any questions or require assistance.      The medications listed below were removed from the home medication list. Please reorder if appropriate:  Patient reports no longer taking the following medication(s):  VENTOLIN HFA  BELBUCA 900MCG  OMNICEF 300MG  KLONOPIN 2MG  XYZAL 5MG  HIPREX 1GM  REMERON 30MG  MIRALAX 17GM  SEROQUEL 400MG  FLOMAX 0.4MG    Medications listed below were obtained from: Patient/family, Medications brought from home, and Analytic software- [employer]  (Not in a hospital admission)        Yony Rios  [id number]    Current Outpatient Medications on File Prior to Encounter   Medication Sig Dispense Refill Last Dose    aspirin (ECOTRIN) 81 MG EC tablet Take 1 tablet (81 mg total) by mouth once daily.   9/27/2023    estradioL (ESTRACE) 0.01 % (0.1 mg/gram) vaginal cream Place 2 g vaginally 3 (three) times a week. 60 g 5 Past Week    eszopiclone (LUNESTA) 2 MG Tab Take 2 mg by mouth every evening.   9/27/2023    gabapentin (NEURONTIN) 600 MG tablet Take 600 mg by mouth 2 (two) times daily.   9/27/2023    LIDOcaine (LIDODERM) 5 % Place 1 patch onto the skin 3 (three) times daily as needed.   9/27/2023    losartan (COZAAR) 50 MG tablet Take 50 mg by mouth every morning.   9/27/2023    oxybutynin (DITROPAN-XL) 10 MG 24 hr tablet Take 10 mg by mouth once daily.   9/27/2023    oxyCODONE (ROXICODONE) 15 MG Tab Take 15 mg by mouth 3 (three) times daily as needed.   Not taking    promethazine (PHENERGAN) 25 MG tablet Take 1 tablet (25 mg total) by mouth every 6 (six) hours as needed for Nausea. 12 " tablet 0 9/27/2023    tiZANidine (ZANAFLEX) 4 MG tablet Take 4 mg by mouth 2 (two) times a day.   9/27/2023    topiramate (TOPAMAX) 50 MG tablet Take 50 mg by mouth 2 (two) times daily.   9/27/2023     .

## 2023-09-29 PROBLEM — R41.82 ALTERED MENTAL STATUS: Status: ACTIVE | Noted: 2023-09-29

## 2023-09-29 PROBLEM — E66.09 OBESITY DUE TO EXCESS CALORIES WITH SERIOUS COMORBIDITY: Status: ACTIVE | Noted: 2023-09-29

## 2023-09-29 LAB
ALBUMIN SERPL BCP-MCNC: 3.3 G/DL (ref 3.5–5.2)
ALP SERPL-CCNC: 94 U/L (ref 55–135)
ALT SERPL W/O P-5'-P-CCNC: 49 U/L (ref 10–44)
ANION GAP SERPL CALC-SCNC: 12 MMOL/L (ref 8–16)
AORTIC ROOT ANNULUS: 3.09 CM
ASCENDING AORTA: 3.37 CM
AST SERPL-CCNC: 54 U/L (ref 10–40)
AV INDEX (PROSTH): 0.57
AV MEAN GRADIENT: 11 MMHG
AV PEAK GRADIENT: 20 MMHG
AV VALVE AREA BY VELOCITY RATIO: 1.2 CM²
AV VALVE AREA: 1.69 CM²
AV VELOCITY RATIO: 0.41
BASOPHILS # BLD AUTO: 0.02 K/UL (ref 0–0.2)
BASOPHILS NFR BLD: 0.1 % (ref 0–1.9)
BILIRUB SERPL-MCNC: 1.2 MG/DL (ref 0.1–1)
BSA FOR ECHO PROCEDURE: 2.15 M2
BUN SERPL-MCNC: 32 MG/DL (ref 8–23)
CALCIUM SERPL-MCNC: 9.5 MG/DL (ref 8.7–10.5)
CHLORIDE SERPL-SCNC: 111 MMOL/L (ref 95–110)
CO2 SERPL-SCNC: 17 MMOL/L (ref 23–29)
CREAT SERPL-MCNC: 2.2 MG/DL (ref 0.5–1.4)
CV ECHO LV RWT: 0.86 CM
DIFFERENTIAL METHOD: ABNORMAL
DOP CALC AO PEAK VEL: 2.24 M/S
DOP CALC AO VTI: 35.2 CM
DOP CALC LVOT AREA: 3 CM2
DOP CALC LVOT DIAMETER: 1.94 CM
DOP CALC LVOT PEAK VEL: 0.91 M/S
DOP CALC LVOT STROKE VOLUME: 59.38 CM3
DOP CALC RVOT PEAK VEL: 0.68 M/S
DOP CALC RVOT VTI: 9.1 CM
DOP CALCLVOT PEAK VEL VTI: 20.1 CM
E WAVE DECELERATION TIME: 296.65 MSEC
E/A RATIO: 0.84
E/E' RATIO: 10.4 M/S
ECHO LV POSTERIOR WALL: 1.34 CM (ref 0.6–1.1)
EOSINOPHIL # BLD AUTO: 0 K/UL (ref 0–0.5)
EOSINOPHIL NFR BLD: 0 % (ref 0–8)
ERYTHROCYTE [DISTWIDTH] IN BLOOD BY AUTOMATED COUNT: 13.4 % (ref 11.5–14.5)
EST. GFR  (NO RACE VARIABLE): 23 ML/MIN/1.73 M^2
FRACTIONAL SHORTENING: 30 % (ref 28–44)
GLUCOSE SERPL-MCNC: 127 MG/DL (ref 70–110)
HCT VFR BLD AUTO: 41.5 % (ref 37–48.5)
HGB BLD-MCNC: 12.5 G/DL (ref 12–16)
IMM GRANULOCYTES # BLD AUTO: 0.15 K/UL (ref 0–0.04)
IMM GRANULOCYTES NFR BLD AUTO: 0.7 % (ref 0–0.5)
INTERVENTRICULAR SEPTUM: 1.43 CM (ref 0.6–1.1)
IVC DIAMETER: 1.47 CM
IVRT: 72.31 MSEC
LA MAJOR: 5.89 CM
LA MINOR: 5.22 CM
LA WIDTH: 3.7 CM
LEFT ATRIUM SIZE: 2.29 CM
LEFT ATRIUM VOLUME INDEX: 19.1 ML/M2
LEFT ATRIUM VOLUME: 39.86 CM3
LEFT INTERNAL DIMENSION IN SYSTOLE: 2.18 CM (ref 2.1–4)
LEFT VENTRICLE DIASTOLIC VOLUME INDEX: 18.48 ML/M2
LEFT VENTRICLE DIASTOLIC VOLUME: 38.62 ML
LEFT VENTRICLE MASS INDEX: 70 G/M2
LEFT VENTRICLE SYSTOLIC VOLUME INDEX: 7.6 ML/M2
LEFT VENTRICLE SYSTOLIC VOLUME: 15.78 ML
LEFT VENTRICULAR INTERNAL DIMENSION IN DIASTOLE: 3.12 CM (ref 3.5–6)
LEFT VENTRICULAR MASS: 145.34 G
LV LATERAL E/E' RATIO: 11.14 M/S
LV SEPTAL E/E' RATIO: 9.75 M/S
LVOT MG: 2.28 MMHG
LVOT MV: 0.73 CM/S
LYMPHOCYTES # BLD AUTO: 0.9 K/UL (ref 1–4.8)
LYMPHOCYTES NFR BLD: 4 % (ref 18–48)
MAGNESIUM SERPL-MCNC: 1.9 MG/DL (ref 1.6–2.6)
MCH RBC QN AUTO: 29.2 PG (ref 27–31)
MCHC RBC AUTO-ENTMCNC: 30.1 G/DL (ref 32–36)
MCV RBC AUTO: 97 FL (ref 82–98)
MONOCYTES # BLD AUTO: 1.9 K/UL (ref 0.3–1)
MONOCYTES NFR BLD: 8.9 % (ref 4–15)
MV PEAK A VEL: 0.93 M/S
MV PEAK E VEL: 0.78 M/S
MV STENOSIS PRESSURE HALF TIME: 86.03 MS
MV VALVE AREA P 1/2 METHOD: 2.56 CM2
NEUTROPHILS # BLD AUTO: 18.6 K/UL (ref 1.8–7.7)
NEUTROPHILS NFR BLD: 86.3 % (ref 38–73)
NRBC BLD-RTO: 0 /100 WBC
PHOSPHATE SERPL-MCNC: 1.9 MG/DL (ref 2.7–4.5)
PISA TR MAX VEL: 2.04 M/S
PLATELET # BLD AUTO: 252 K/UL (ref 150–450)
PMV BLD AUTO: 9.3 FL (ref 9.2–12.9)
POTASSIUM SERPL-SCNC: 4.3 MMOL/L (ref 3.5–5.1)
PROT SERPL-MCNC: 7 G/DL (ref 6–8.4)
PV MEAN GRADIENT: 1 MMHG
PV MV: 0.39 M/S
PV PEAK GRADIENT: 1 MMHG
PV PEAK VELOCITY: 0.59 M/S
RA MAJOR: 5.18 CM
RA PRESSURE ESTIMATED: 3 MMHG
RA WIDTH: 3.7 CM
RBC # BLD AUTO: 4.28 M/UL (ref 4–5.4)
RV MID DIAMA: 3.63 CM
RV TB RVSP: 5 MMHG
SODIUM SERPL-SCNC: 140 MMOL/L (ref 136–145)
STJ: 3.36 CM
TDI LATERAL: 0.07 M/S
TDI SEPTAL: 0.08 M/S
TDI: 0.08 M/S
TR MAX PG: 17 MMHG
TRICUSPID ANNULAR PLANE SYSTOLIC EXCURSION: 2.25 CM
TROPONIN I SERPL DL<=0.01 NG/ML-MCNC: 3.65 NG/ML (ref 0–0.03)
TV REST PULMONARY ARTERY PRESSURE: 20 MMHG
WBC # BLD AUTO: 21.54 K/UL (ref 3.9–12.7)
Z-SCORE OF LEFT VENTRICULAR DIMENSION IN END DIASTOLE: -7.26
Z-SCORE OF LEFT VENTRICULAR DIMENSION IN END SYSTOLE: -4.68

## 2023-09-29 PROCEDURE — 99223 1ST HOSP IP/OBS HIGH 75: CPT | Mod: ,,, | Performed by: INTERNAL MEDICINE

## 2023-09-29 PROCEDURE — 36415 COLL VENOUS BLD VENIPUNCTURE: CPT | Performed by: HOSPITALIST

## 2023-09-29 PROCEDURE — 25000003 PHARM REV CODE 250: Performed by: FAMILY MEDICINE

## 2023-09-29 PROCEDURE — 80053 COMPREHEN METABOLIC PANEL: CPT | Performed by: HOSPITALIST

## 2023-09-29 PROCEDURE — 25000003 PHARM REV CODE 250: Performed by: HOSPITALIST

## 2023-09-29 PROCEDURE — 84100 ASSAY OF PHOSPHORUS: CPT | Performed by: HOSPITALIST

## 2023-09-29 PROCEDURE — 11000001 HC ACUTE MED/SURG PRIVATE ROOM

## 2023-09-29 PROCEDURE — 36415 COLL VENOUS BLD VENIPUNCTURE: CPT | Performed by: FAMILY MEDICINE

## 2023-09-29 PROCEDURE — 99223 PR INITIAL HOSPITAL CARE,LEVL III: ICD-10-PCS | Mod: ,,, | Performed by: INTERNAL MEDICINE

## 2023-09-29 PROCEDURE — 83735 ASSAY OF MAGNESIUM: CPT | Performed by: HOSPITALIST

## 2023-09-29 PROCEDURE — 63600175 PHARM REV CODE 636 W HCPCS: Performed by: HOSPITALIST

## 2023-09-29 PROCEDURE — 63600175 PHARM REV CODE 636 W HCPCS: Performed by: FAMILY MEDICINE

## 2023-09-29 PROCEDURE — 84484 ASSAY OF TROPONIN QUANT: CPT | Performed by: FAMILY MEDICINE

## 2023-09-29 PROCEDURE — 85025 COMPLETE CBC W/AUTO DIFF WBC: CPT | Performed by: HOSPITALIST

## 2023-09-29 RX ORDER — SODIUM CHLORIDE 9 MG/ML
INJECTION, SOLUTION INTRAVENOUS CONTINUOUS
Status: DISCONTINUED | OUTPATIENT
Start: 2023-09-29 | End: 2023-10-01 | Stop reason: HOSPADM

## 2023-09-29 RX ADMIN — ENOXAPARIN SODIUM 30 MG: 30 INJECTION SUBCUTANEOUS at 06:09

## 2023-09-29 RX ADMIN — VANCOMYCIN HYDROCHLORIDE 2000 MG: 500 INJECTION, POWDER, LYOPHILIZED, FOR SOLUTION INTRAVENOUS at 08:09

## 2023-09-29 RX ADMIN — SODIUM CHLORIDE: 9 INJECTION, SOLUTION INTRAVENOUS at 08:09

## 2023-09-29 RX ADMIN — CEFTRIAXONE 1 G: 1 INJECTION, POWDER, FOR SOLUTION INTRAMUSCULAR; INTRAVENOUS at 03:09

## 2023-09-29 RX ADMIN — ACETAMINOPHEN 650 MG: 325 TABLET ORAL at 10:09

## 2023-09-29 NOTE — HPI
"Desirae No is a 73 y.o. female with a PMH  has a past medical history of DDD (degenerative disc disease), lumbar (4/11/2014), Hypertension, Neurogenic bladder, and Pulmonary embolism (4/11/2014). who presented to the ED via EMS for further evaluation of altered mental status x2 days duration.  Patient was seen in the ED on 09/26 following syncope episode and potential accidental drug overdose.  Patient received CPR for approximately 10 minutes after EMS was unable to palpate pulse and was administered Narcan with patient becoming responsive.  Patient was treated in the ED and discharged back home with diagnosis of UTI.  Per chart review, patient usually endorses syncope episodes while using the restroom.  At time of discharge, patient was initiated on cefdinir and had progressively worsening mental status since returning home despite compliance with medications.  History was obtained through chart review and ED sign-out S patient continues to be altered at time of bedside assessment and only responding "hi" intermittently and not following commands.  Attempted to contact family without success.  EMS earlier tonight also reported questionable right-sided weakness but unable to confirm this due to current mentation.  Initial workup in the ED revealed patient met SIRS criteria for sepsis with UA positive for UTI, troponin elevated measuring 6.180 in absence of ischemia on EKG, in addition to acute renal failure.  CT head negative for acute intracranial abnormalities.  Patient admitted to Hospital Medicine inpatient for continued medical management.    PCP: Iram Gomez    "

## 2023-09-29 NOTE — PROGRESS NOTES
Pharmacokinetic Initial Assessment: IV Vancomycin    Assessment/Plan:    Initiate intravenous vancomycin with loading dose of 2000 mg once with subsequent doses when random concentrations are less than 20 mcg/mL  Desired empiric serum trough concentration is 15 to 20 mcg/mL  Draw vancomycin random level on 9/30/23 at 0600.  Pharmacy will continue to follow and monitor vancomycin.      Please contact pharmacy at extension [phone] with any questions regarding this assessment.     Thank you for the consult,   Dana Myrna       Patient brief summary:  Desirae No is a 73 y.o. female initiated on antimicrobial therapy with IV Vancomycin for treatment of suspected sepsis    Drug Allergies:   Review of patient's allergies indicates:   Allergen Reactions    Nucynta [tapentadol] Nausea And Vomiting    Prochlorperazine Nausea And Vomiting    Stadol [butorphanol tartrate] Nausea And Vomiting    Ibuprofen     Metoclopramide     Reglan [metoclopramide hcl]     Toradol [ketorolac]     Zofran [ondansetron hcl (pf)]        Actual Body Weight:   97.5 kg     Renal Function:   Estimated Creatinine Clearance: 26.5 mL/min (A) (based on SCr of 2.2 mg/dL (H)).,     Dialysis Method (if applicable):  N/A    CBC (last 72 hours):  Recent Labs   Lab Result Units 09/26/23 1849 09/28/23  1524 09/29/23  0520   WBC K/uL 12.21 18.54* 21.54*   Hemoglobin g/dL 13.9 14.7 12.5   Hematocrit % 43.9 46.6 41.5   Platelets K/uL 347 257 252   Gran % % 68.5 81.0* 86.3*   Lymph % % 22.0 8.6* 4.0*   Mono % % 6.4 9.2 8.9   Eosinophil % % 2.5 0.3 0.0   Basophil % % 0.1 0.1 0.1   Differential Method  Automated Automated Automated       Metabolic Panel (last 72 hours):  Recent Labs   Lab Result Units 09/26/23 1849 09/26/23  1851 09/28/23  1524 09/28/23  1622 09/29/23  0520   Sodium mmol/L 140  --  140  --  140   Potassium mmol/L 5.1  --  4.2  --  4.3   Chloride mmol/L 108  --  109  --  111*   CO2 mmol/L 21*  --  18*  --  17*   Glucose mg/dL 137*  --  118*   "--  127*   Glucose, UA   --  Negative  --  Negative  --    BUN mg/dL 20  --  39*  --  32*   Creatinine mg/dL 1.6*  --  3.7*  --  2.2*   Albumin g/dL 4.2  --  3.9  --  3.3*   Total Bilirubin mg/dL 0.6  --  0.7  --  1.2*   Alkaline Phosphatase U/L 79  --  85  --  94   AST U/L 27  --  49*  --  54*   ALT U/L 15  --  31  --  49*   Magnesium mg/dL  --   --   --   --  1.9   Phosphorus mg/dL  --   --   --   --  1.9*       Drug levels (last 3 results):  No results for input(s): "VANCOMYCINRA", "VANCORANDOM", "VANCOMYCINPE", "VANCOPEAK", "VANCOMYCINTR", "VANCOTROUGH" in the last 72 hours.    Microbiologic Results:  Microbiology Results (last 7 days)       Procedure Component Value Units Date/Time    Blood culture x two cultures. Draw prior to antibiotics. [[id number]] Collected: 09/28/23 1609    Order Status: Completed Specimen: Blood from Peripheral, Antecubital, Left Updated: 09/29/23 0715     Blood Culture, Routine No Growth to date    Narrative:      Aerobic and anaerobic    Blood culture x two cultures. Draw prior to antibiotics. [[id number]] Collected: 09/28/23 1559    Order Status: Completed Specimen: Blood from Peripheral, Antecubital, Left Updated: 09/29/23 0715     Blood Culture, Routine No Growth to date    Narrative:      Aerobic and anaerobic    Urine culture [[id number]] Collected: 09/28/23 1622    Order Status: No result Specimen: Urine Updated: 09/28/23 1637            "

## 2023-09-29 NOTE — SUBJECTIVE & OBJECTIVE
Interval History: No issues reported overnight.     Review of Systems   Unable to perform ROS: Mental status change     Objective:     Vital Signs (Most Recent):  Temp: (!) 101.1 °F (38.4 °C) (09/29/23 1001)  Pulse: 83 (09/29/23 0801)  Resp: 18 (09/29/23 0801)  BP: 123/60 (09/29/23 0801)  SpO2: (!) 94 % (09/29/23 0801) Vital Signs (24h Range):  Temp:  [98.7 °F (37.1 °C)-101.1 °F (38.4 °C)] 101.1 °F (38.4 °C)  Pulse:  [77-89] 83  Resp:  [18-24] 18  SpO2:  [93 %-96 %] 94 %  BP: (109-135)/(51-74) 123/60     Weight: 97.8 kg (215 lb 9.8 oz)  Body mass index is 33.77 kg/m².    Intake/Output Summary (Last 24 hours) at 9/29/2023 1052  Last data filed at 9/29/2023 0400  Gross per 24 hour   Intake 0 ml   Output 1400 ml   Net -1400 ml         Physical Exam  Constitutional:       General: She is not in acute distress.     Appearance: She is well-developed. She is not diaphoretic.   HENT:      Head: Normocephalic and atraumatic.   Eyes:      Pupils: Pupils are equal, round, and reactive to light.   Cardiovascular:      Rate and Rhythm: Normal rate and regular rhythm.      Heart sounds: Normal heart sounds. No murmur heard.     No friction rub. No gallop.   Pulmonary:      Effort: Pulmonary effort is normal. No respiratory distress.      Breath sounds: Normal breath sounds. No stridor. No wheezing or rales.   Abdominal:      General: Bowel sounds are normal. There is no distension.      Palpations: Abdomen is soft. There is no mass.      Tenderness: There is no abdominal tenderness. There is no guarding.   Genitourinary:     Comments: Suprapubic catheter  Skin:     General: Skin is warm.      Findings: No erythema.   Neurological:      Mental Status: She is alert. She is disoriented.             Significant Labs: All pertinent labs within the past 24 hours have been reviewed.    Significant Imaging: I have reviewed all pertinent imaging results/findings within the past 24 hours.

## 2023-09-29 NOTE — ASSESSMENT & PLAN NOTE
This patient does have evidence of infective focus  My overall impression is sepsis.  Source: Urinary Tract  Antibiotics given-   Antibiotics (72h ago, onward)    Start     Stop Route Frequency Ordered    09/29/23 1600  cefTRIAXone (ROCEPHIN) 1 g in dextrose 5 % in water (D5W) 100 mL IVPB (MB+)  ( Urinary Tract Infection (UTI))         10/04/23 1559 IV Every 24 hours (non-standard times) 09/28/23 1944        Latest lactate reviewed-  Recent Labs   Lab 09/26/23  1915 09/28/23  1524 09/28/23  1904   LACTATE 1.9 1.7 1.0     Organ dysfunction indicated by Acute kidney injury and Encephalopathy    Fluid challenge Ideal Body Weight- The patient's ideal body weight is Ideal body weight: 61.6 kg (135 lb 12.9 oz) which will be used to calculate fluid bolus of 30 ml/kg for treatment of septic shock.      Post- resuscitation assessment No - Post resuscitation assessment not needed     Will Not start Pressors- Levophed for MAP of 65  Source control achieved by: Rocephin

## 2023-09-29 NOTE — ASSESSMENT & PLAN NOTE
Patient with acute kidney injury/acute renal failure likely due to pre-renal azotemia due to dehydration AMANDA is currently currently receiving IVFs and being treated for UTI. Baseline creatinine wnl - Labs reviewed- Renal function/electrolytes with Estimated Creatinine Clearance: 15.8 mL/min (A) (based on SCr of 3.7 mg/dL (H)). according to latest data. Monitor urine output and serial BMP and adjust therapy as needed. Avoid nephrotoxins and renally dose meds for GFR listed above.

## 2023-09-29 NOTE — ASSESSMENT & PLAN NOTE
Patient found to have elevated troponin measuring 6.180 with repeat measuring 4.910 in absence of reported chest pain.  EKG without evidence of acute ischemia noted.  Likely secondary to acute renal failure however, patient was previously seen in ED on 09/26 for presumed syncope/cardiac arrest/accidental drug overdose.  Plan:  -Telemetry  -serial EKGs   -trend troponin levels  -cardiology consult pending clinical course     9/29:  Consult cardiology   troponins trending down   Possibly due to pt receiving compressions

## 2023-09-29 NOTE — ASSESSMENT & PLAN NOTE
Patient with known neurogenic bladder status post suprapubic catheter currently undergoes exchange every 2 weeks.  Patient also reportedly suffers from recurrent UTIs.  Patient initiated on Rocephin with cultures obtained and pending.  Plan:  -continue antibiotics  -f/u cultures  -urology consulted for catheter exchange

## 2023-09-29 NOTE — PLAN OF CARE
[facility] - Med Surg  Initial Discharge Assessment       Primary Care Provider: Iram Gomez NP    Admission Diagnosis: Altered mental status [R41.82]  Pain [R52]  NSTEMI (non-ST elevated myocardial infarction) [I21.4]  Severe sepsis [A41.9, R65.20]  Acute renal failure, unspecified acute renal failure type [N17.9]  Leukocytosis, unspecified type [D72.829]    Admission Date: 9/28/2023  Expected Discharge Date:     Transition of Care Barriers: None    Payor: [employer] MEDICARE / Plan: Schedule C Systems 65 / Product Type: Medicare Advantage /     Extended Emergency Contact Information  Primary Emergency Contact: NARGIS GILLIS  Mobile Phone: [phone]  Relation: Son   needed? No    Discharge Plan A: Home Health  Discharge Plan B: Home with family      [facility] - [place], [place] - [address]   [address] [facility] [address]  Phone: [phone] Fax: [phone]    [address] [address]  Phone: [phone] Fax: [phone]      Initial Assessment (most recent)       Adult Discharge Assessment - 09/29/23 1207          Discharge Assessment    Assessment Type Discharge Planning Assessment     Confirmed/corrected address, phone number and insurance Yes     Confirmed Demographics Correct on Facesheet     Source of Information family     When was your last doctors appointment? 08/28/23   Iram Gomez NP - Family Medicine    Communicated SO with patient/caregiver Date not available/Unable to determine     Facility Arrived From: Altered mental status     Do you expect to return to your current living situation? Yes     Do you have help at home or someone to help you manage your care at home? Yes     Who are your caregiver(s) and their phone number(s)? Nargis Angelaefer - son     Prior to hospitilization cognitive status: Unable to Assess     Current cognitive status: Unable to  Assess     Walking or Climbing Stairs ambulation difficulty, dependent     Mobility Management wheelchair     Dressing/Bathing bathing difficulty, assistance 1 person;dressing difficulty, assistance 1 person     Dressing/Bathing Management Bello No - elayne     Home Accessibility wheelchair accessible     Equipment Currently Used at Home wheelchair;shower chair     Readmission within 30 days? No     Patient currently being followed by outpatient case management? No     Do you currently have service(s) that help you manage your care at home? Yes     Name and Contact number of agency [facility] - nursing     Is the pt/caregiver preference to resume services with current agency Yes     Do you take prescription medications? Yes     Do you have prescription coverage? Yes     Coverage [employer]     Do you have any problems affording any of your prescribed medications? No     Is the patient taking medications as prescribed? yes     Who is going to help you get home at discharge? Bello [place] elayne     How do you get to doctors appointments? family or friend will provide     Are you on dialysis? No     Do you take coumadin? No     DME Needed Upon Discharge  none     Discharge Plan discussed with: Adult children     Transition of Care Barriers None     Discharge Plan A Home Health     Discharge Plan B Home with family                   Anticipated DC dispo: Resume with [facility]   Prior Level of Function: Patient uses [employer] for ambulating and needs assistance with bathing and dressing.  People in home: Bello No - elayne    Comments:  ALBERTINA spoke to Patient's son, Bello, over the phone to introduce role and discuss discharge planning. Patient's son, Bello,  will be help at home and can provide transport at time of discharge. Confirmed demographics, insurance, and emergency contacts. CM discharge needs depends on hospital progress. ALBERTINA will continue following to assist  with other needs.

## 2023-09-29 NOTE — ASSESSMENT & PLAN NOTE
Patient found to have elevated troponin measuring 6.180 with repeat measuring 4.910 in absence of reported chest pain.  EKG without evidence of acute ischemia noted.  Likely secondary to acute renal failure however, patient was previously seen in ED on 09/26 for presumed syncope/cardiac arrest/accidental drug overdose.  Plan:  -Telemetry  -serial EKGs   -trend troponin levels  -cardiology consult pending clinical course

## 2023-09-29 NOTE — SUBJECTIVE & OBJECTIVE
Past Medical History:   Diagnosis Date    DDD (degenerative disc disease), lumbar 4/11/2014    Hypertension     Neurogenic bladder     Pulmonary embolism 4/11/2014       Past Surgical History:   Procedure Laterality Date    AUGMENTATION OF BREAST      BACK SURGERY      CERVICAL FUSION      CHOLECYSTECTOMY      HYSTERECTOMY  1974    menorrhagia    INCISION AND DRAINAGE OF ABSCESS Right 10/16/2018    Procedure: INCISION AND DRAINAGE, ABSCESS;  Surgeon: Miguel Verduzco MD;  Location: [facility];  Service: General;  Laterality: Right;    INSERTION OF SUPRAPUBIC CATHETER      JOINT REPLACEMENT      neck fusion      OOPHORECTOMY         Review of patient's allergies indicates:   Allergen Reactions    Nucynta [tapentadol] Nausea And Vomiting    Prochlorperazine Nausea And Vomiting    Stadol [butorphanol tartrate] Nausea And Vomiting    Ibuprofen     Metoclopramide     Reglan [metoclopramide hcl]     Toradol [ketorolac]     Zofran [ondansetron hcl (pf)]        No current facility-administered medications on file prior to encounter.     Current Outpatient Medications on File Prior to Encounter   Medication Sig    aspirin (ECOTRIN) 81 MG EC tablet Take 1 tablet (81 mg total) by mouth once daily.    estradioL (ESTRACE) 0.01 % (0.1 mg/gram) vaginal cream Place 2 g vaginally 3 (three) times a week.    eszopiclone (LUNESTA) 2 MG Tab Take 2 mg by mouth every evening.    gabapentin (NEURONTIN) 600 MG tablet Take 600 mg by mouth 2 (two) times daily.    LIDOcaine (LIDODERM) 5 % Place 1 patch onto the skin 3 (three) times daily as needed.    losartan (COZAAR) 50 MG tablet Take 50 mg by mouth every morning.    oxybutynin (DITROPAN-XL) 10 MG 24 hr tablet Take 10 mg by mouth once daily.    promethazine (PHENERGAN) 25 MG tablet Take 1 tablet (25 mg total) by mouth every 6 (six) hours as needed for Nausea.    tiZANidine (ZANAFLEX) 4 MG tablet Take 4 mg by mouth 2 (two) times a day.    topiramate (TOPAMAX) 50 MG tablet Take 50 mg by mouth 2  (two) times daily.    oxyCODONE (ROXICODONE) 15 MG Tab Take 15 mg by mouth 3 (three) times daily as needed.     Family History       Problem Relation (Age of Onset)    Stroke Father          Tobacco Use    Smoking status: Never    Smokeless tobacco: Never   Substance and Sexual Activity    Alcohol use: No    Drug use: No    Sexual activity: Yes     Partners: Male     Birth control/protection: None, Surgical     Review of Systems   Constitutional: Negative. Negative for weight gain.   HENT: Negative.     Eyes: Negative.    Cardiovascular: Negative.  Negative for chest pain, leg swelling and palpitations.   Respiratory: Negative.  Negative for shortness of breath.    Endocrine: Negative.    Hematologic/Lymphatic: Negative.    Skin: Negative.    Musculoskeletal:  Negative for muscle weakness.   Gastrointestinal: Negative.    Genitourinary: Negative.    Neurological: Negative.  Negative for dizziness.   Psychiatric/Behavioral: Negative.     Allergic/Immunologic: Negative.    All other systems reviewed and are negative.    Objective:     Vital Signs (Most Recent):  Temp: 99.9 °F (37.7 °C) (09/29/23 1137)  Pulse: 81 (09/29/23 1137)  Resp: 18 (09/29/23 1137)  BP: 102/60 (09/29/23 1137)  SpO2: (Abnormal) 93 % (09/29/23 1137) Vital Signs (24h Range):  Temp:  [98.7 °F (37.1 °C)-101.1 °F (38.4 °C)] 99.9 °F (37.7 °C)  Pulse:  [77-89] 81  Resp:  [18-24] 18  SpO2:  [93 %-96 %] 93 %  BP: (102-135)/(51-74) 102/60     Weight: 97.5 kg (215 lb)  Body mass index is 33.67 kg/m².    SpO2: (Abnormal) 93 %         Intake/Output Summary (Last 24 hours) at 9/29/2023 1304  Last data filed at 9/29/2023 0400  Gross per 24 hour   Intake 0 ml   Output 1400 ml   Net -1400 ml       Lines/Drains/Airways       Drain       Name Duration         Suprapubic Catheter 07/20/21 0350 latex 16 Fr. 801 days              Peripheral Intravenous Line       Name Duration         Peripheral IV - Single Lumen 09/28/23 1442 22 G Left;Posterior Hand <1 day          Peripheral IV - Single Lumen 09/28/23 1555 20 G Left Antecubital <1 day                     Physical Exam  Vitals and nursing note reviewed.   Constitutional:       Appearance: She is well-developed.   HENT:      Head: Normocephalic and atraumatic.   Eyes:      Conjunctiva/sclera: Conjunctivae normal.      Pupils: Pupils are equal, round, and reactive to light.   Cardiovascular:      Rate and Rhythm: Normal rate and regular rhythm.      Pulses: Intact distal pulses.      Heart sounds: Normal heart sounds. Murmur: 2/6 AS murmur.   Pulmonary:      Effort: Pulmonary effort is normal.      Breath sounds: Normal breath sounds.   Abdominal:      General: Bowel sounds are normal.      Palpations: Abdomen is soft.   Musculoskeletal:         General: Normal range of motion.      Cervical back: Normal range of motion and neck supple.   Skin:     General: Skin is warm and dry.   Neurological:      Mental Status: She is alert and oriented to person, place, and time.          Significant Labs: All pertinent lab results from the last 24 hours have been reviewed.    Significant Imaging: X-Ray: CXR: X-Ray Chest 1 View (CXR): No results found for this visit on 09/28/23.

## 2023-09-29 NOTE — CONSULTS
[facility]  Cardiology  Consult Note    Patient Name: Desirae No  MRN: [id number]  Admission Date: 9/28/2023  Hospital Length of Stay: 0 days  Code Status: Full Code   Attending Provider: Dennis Garcia MD   Consulting Provider: Nino Lazcano MD  Primary Care Physician: Iram Gomez NP  Principal Problem:Altered mental status    Patient information was obtained from patient and ER records.     Inpatient consult to Cardiology  Consult performed by: Nino Lazcano MD  Consult ordered by: Dennis Garcia MD        Subjective:     Chief Complaint:  troponin     HPI:   73 y.o. female with a PMH  has a past medical history of Hypertension,  and Pulmonary embolism (2014) admitted for altered mental status changes, sepsis, AMANDA  With elevated troponin ( peak 6.1 now down to 3.6). EKG no changes from previous EKGs NSR, poor r wave progression. Patient denies CP, angina or anginal equivalent.       Past Medical History:   Diagnosis Date    DDD (degenerative disc disease), lumbar 4/11/2014    Hypertension     Neurogenic bladder     Pulmonary embolism 4/11/2014       Past Surgical History:   Procedure Laterality Date    AUGMENTATION OF BREAST      BACK SURGERY      CERVICAL FUSION      CHOLECYSTECTOMY      HYSTERECTOMY  1974    menorrhagia    INCISION AND DRAINAGE OF ABSCESS Right 10/16/2018    Procedure: INCISION AND DRAINAGE, ABSCESS;  Surgeon: Miguel Verduzco MD;  [facility];  Service: General;  Laterality: Right;    INSERTION OF SUPRAPUBIC CATHETER      JOINT REPLACEMENT      neck fusion      OOPHORECTOMY         Review of patient's allergies indicates:   Allergen Reactions    Nucynta [tapentadol] Nausea And Vomiting    Prochlorperazine Nausea And Vomiting    Stadol [butorphanol tartrate] Nausea And Vomiting    Ibuprofen     Metoclopramide     Reglan [metoclopramide hcl]     Toradol [ketorolac]     Zofran [ondansetron hcl (pf)]        No current facility-administered medications on file prior to encounter.      Current Outpatient Medications on File Prior to Encounter   Medication Sig    aspirin (ECOTRIN) 81 MG EC tablet Take 1 tablet (81 mg total) by mouth once daily.    estradioL (ESTRACE) 0.01 % (0.1 mg/gram) vaginal cream Place 2 g vaginally 3 (three) times a week.    eszopiclone (LUNESTA) 2 MG Tab Take 2 mg by mouth every evening.    gabapentin (NEURONTIN) 600 MG tablet Take 600 mg by mouth 2 (two) times daily.    LIDOcaine (LIDODERM) 5 % Place 1 patch onto the skin 3 (three) times daily as needed.    losartan (COZAAR) 50 MG tablet Take 50 mg by mouth every morning.    oxybutynin (DITROPAN-XL) 10 MG 24 hr tablet Take 10 mg by mouth once daily.    promethazine (PHENERGAN) 25 MG tablet Take 1 tablet (25 mg total) by mouth every 6 (six) hours as needed for Nausea.    tiZANidine (ZANAFLEX) 4 MG tablet Take 4 mg by mouth 2 (two) times a day.    topiramate (TOPAMAX) 50 MG tablet Take 50 mg by mouth 2 (two) times daily.    oxyCODONE (ROXICODONE) 15 MG Tab Take 15 mg by mouth 3 (three) times daily as needed.     Family History       Problem Relation (Age of Onset)    Stroke Father          Tobacco Use    Smoking status: Never    Smokeless tobacco: Never   Substance and Sexual Activity    Alcohol use: No    Drug use: No    Sexual activity: Yes     Partners: Male     Birth control/protection: None, Surgical     Review of Systems   Constitutional: Negative. Negative for weight gain.   HENT: Negative.     Eyes: Negative.    Cardiovascular: Negative.  Negative for chest pain, leg swelling and palpitations.   Respiratory: Negative.  Negative for shortness of breath.    Endocrine: Negative.    Hematologic/Lymphatic: Negative.    Skin: Negative.    Musculoskeletal:  Negative for muscle weakness.   Gastrointestinal: Negative.    Genitourinary: Negative.    Neurological: Negative.  Negative for dizziness.   Psychiatric/Behavioral: Negative.     Allergic/Immunologic: Negative.    All other systems reviewed and are  negative.    Objective:     Vital Signs (Most Recent):  Temp: 99.9 °F (37.7 °C) (09/29/23 1137)  Pulse: 81 (09/29/23 1137)  Resp: 18 (09/29/23 1137)  BP: 102/60 (09/29/23 1137)  SpO2: (Abnormal) 93 % (09/29/23 1137) Vital Signs (24h Range):  Temp:  [98.7 °F (37.1 °C)-101.1 °F (38.4 °C)] 99.9 °F (37.7 °C)  Pulse:  [77-89] 81  Resp:  [18-24] 18  SpO2:  [93 %-96 %] 93 %  BP: (102-135)/(51-74) 102/60     Weight: 97.5 kg (215 lb)  Body mass index is 33.67 kg/m².    SpO2: (Abnormal) 93 %         Intake/Output Summary (Last 24 hours) at 9/29/2023 1304  Last data filed at 9/29/2023 0400  Gross per 24 hour   Intake 0 ml   Output 1400 ml   Net -1400 ml       Lines/Drains/Airways       Drain       Name Duration         Suprapubic Catheter 07/20/21 0350 latex 16 Fr. 801 days              Peripheral Intravenous Line       Name Duration         Peripheral IV - Single Lumen 09/28/23 1442 22 G Left;Posterior Hand <1 day         Peripheral IV - Single Lumen 09/28/23 1555 20 G Left Antecubital <1 day                     Physical Exam  Vitals and nursing note reviewed.   Constitutional:       Appearance: She is well-developed.   HENT:      Head: Normocephalic and atraumatic.   Eyes:      Conjunctiva/sclera: Conjunctivae normal.      Pupils: Pupils are equal, round, and reactive to light.   Cardiovascular:      Rate and Rhythm: Normal rate and regular rhythm.      Pulses: Intact distal pulses.      Heart sounds: Normal heart sounds. Murmur: 2/6 AS murmur.   Pulmonary:      Effort: Pulmonary effort is normal.      Breath sounds: Normal breath sounds.   Abdominal:      General: Bowel sounds are normal.      Palpations: Abdomen is soft.   Musculoskeletal:         General: Normal range of motion.      Cervical back: Normal range of motion and neck supple.   Skin:     General: Skin is warm and dry.   Neurological:      Mental Status: She is alert and oriented to person, place, and time.          Significant Labs: All pertinent lab results  from the last 24 hours have been reviewed.    Significant Imaging: X-Ray: CXR: X-Ray Chest 1 View (CXR): No results found for this visit on 09/28/23.    Assessment and Plan:     Elevated troponin  73 y.o. female with a PMH  has a past medical history of DDD (degenerative disc disease), lumbar (4/11/2014), Hypertension, Neurogenic bladder, and Pulmonary embolism (4/11/2014).   admitted for altered mental status changes, sepsis, AMANDA  With elevated troponin ( peak 6.1 now down to 3.6). EKG no changes from previous EKGs NSR, poor r wave progression. Patient denies CP, angina or anginal equivalent.     Echo and EKG wnl limits, troponin from demand ischemia form sepsis/AMANDA. Can follow up in clinic with outpt stress test        VTE Risk Mitigation (From admission, onward)           Ordered     enoxaparin injection 30 mg  Daily         09/28/23 1944     IP VTE HIGH RISK PATIENT  Once         09/28/23 1944     Place sequential compression device  Until discontinued         09/28/23 1944                    Thank you for your consult. I will sign off. Please contact us if you have any additional questions.    Nino Lazcano MD  Cardiology   [facility]

## 2023-09-29 NOTE — HPI
73 y.o. female with a PMH  has a past medical history of Hypertension,  and Pulmonary embolism (2014) admitted for altered mental status changes, sepsis, AMANDA  With elevated troponin ( peak 6.1 now down to 3.6). EKG no changes from previous EKGs NSR, poor r wave progression. Patient denies CP, angina or anginal equivalent.

## 2023-09-29 NOTE — ASSESSMENT & PLAN NOTE
73 y.o. female with a PMH  has a past medical history of DDD (degenerative disc disease), lumbar (4/11/2014), Hypertension, Neurogenic bladder, and Pulmonary embolism (4/11/2014).   admitted for altered mental status changes, sepsis, AMANDA  With elevated troponin ( peak 6.1 now down to 3.6). EKG no changes from previous EKGs NSR, poor r wave progression. Patient denies CP, angina or anginal equivalent.     Echo and EKG wnl limits, troponin from demand ischemia form sepsis/AMANDA. Can follow up in clinic with outpt stress test

## 2023-09-29 NOTE — ASSESSMENT & PLAN NOTE
Patient presented with altered mental status while meeting SIRS criteria for sepsis and was found to have evidence of UTI on UA.  CT head negative for acute intracranial abnormalities.  Patient initiated on Rocephin with cultures obtained and pending.  Plan:  -NPO  -fall precautions  -delirium/dementia precautions  -IVFs  -Tylenol p.r.n. for fever  -antiemetics p.r.n.  -continue treatment of sepsis     9/29:  Likely secondary to sepsis from uti  Should mentation not improve and   She continues to be febrile, may need LP

## 2023-09-29 NOTE — HOSPITAL COURSE
Patient admitted for ams secondary to UTI. She was started on rocephin and abx broadened with addition of vanc. Urine culture grew MRSA and enteroccous. Abx changed to po zyvox, mentation much improved. Pt discharged home.

## 2023-09-29 NOTE — H&P
"[facility] [place] [facility]  History & Physical    Patient Name: Desirae No  MRN: [id number]  Patient Class: OP- Observation  Admission Date: 9/28/2023  Attending Physician: Ravinder High MD   Primary Care Provider: Iram Gomez NP         Patient information was obtained from EMS personnel, past medical records and ER records.     Subjective:     Principal Problem:Altered mental status    Chief Complaint:   Chief Complaint   Patient presents with    Altered Mental Status     Pt. Presents to ED via [facility] due to being altered. Per EMS pt has been altered since Tuesday when she accidentally Od'd. Pt has left sided weakness with right arm pain, and redness.         HPI: Desirae No is a 73 y.o. female with a PMH  has a past medical history of DDD (degenerative disc disease), lumbar (4/11/2014), Hypertension, Neurogenic bladder, and Pulmonary embolism (4/11/2014). who presented to the ED via EMS for further evaluation of altered mental status x2 days duration.  Patient was seen in the ED on 09/26 following syncope episode and potential accidental drug overdose.  Patient received CPR for approximately 10 minutes after EMS was unable to palpate pulse and was administered Narcan with patient becoming responsive.  Patient was treated in the ED and discharged back home with diagnosis of UTI.  Per chart review, patient usually endorses syncope episodes while using the restroom.  At time of discharge, patient was initiated on cefdinir and had progressively worsening mental status since returning home despite compliance with medications.  History was obtained through chart review and ED sign-out S patient continues to be altered at time of bedside assessment and only responding "hi" intermittently and not following commands.  Attempted to contact family without success.  EMS earlier tonight also reported questionable right-sided weakness but unable to confirm this due to current mentation.  Initial " workup in the ED revealed patient met SIRS criteria for sepsis with UA positive for UTI, troponin elevated measuring 6.180 in absence of ischemia on EKG, in addition to acute renal failure.  CT head negative for acute intracranial abnormalities.  Patient admitted to Hospital Medicine inpatient for continued medical management.    PCP: Iram Gomez        Past Medical History:   Diagnosis Date    DDD (degenerative disc disease), lumbar 4/11/2014    Hypertension     Neurogenic bladder     Pulmonary embolism 4/11/2014       Past Surgical History:   Procedure Laterality Date    AUGMENTATION OF BREAST      BACK SURGERY      CERVICAL FUSION      CHOLECYSTECTOMY      HYSTERECTOMY  1974    menorrhagia    INCISION AND DRAINAGE OF ABSCESS Right 10/16/2018    Procedure: INCISION AND DRAINAGE, ABSCESS;  Surgeon: Miguel Verduzco MD;  Location: [facility];  Service: General;  Laterality: Right;    INSERTION OF SUPRAPUBIC CATHETER      JOINT REPLACEMENT      neck fusion      OOPHORECTOMY         Review of patient's allergies indicates:   Allergen Reactions    Nucynta [tapentadol] Nausea And Vomiting    Prochlorperazine Nausea And Vomiting    Stadol [butorphanol tartrate] Nausea And Vomiting    Ibuprofen     Metoclopramide     Reglan [metoclopramide hcl]     Toradol [ketorolac]     Zofran [ondansetron hcl (pf)]        No current facility-administered medications on file prior to encounter.     Current Outpatient Medications on File Prior to Encounter   Medication Sig    aspirin (ECOTRIN) 81 MG EC tablet Take 1 tablet (81 mg total) by mouth once daily.    estradioL (ESTRACE) 0.01 % (0.1 mg/gram) vaginal cream Place 2 g vaginally 3 (three) times a week.    eszopiclone (LUNESTA) 2 MG Tab Take 2 mg by mouth every evening.    gabapentin (NEURONTIN) 600 MG tablet Take 600 mg by mouth 2 (two) times daily.    LIDOcaine (LIDODERM) 5 % Place 1 patch onto the skin 3 (three) times daily as needed.    losartan  (COZAAR) 50 MG tablet Take 50 mg by mouth every morning.    oxybutynin (DITROPAN-XL) 10 MG 24 hr tablet Take 10 mg by mouth once daily.    promethazine (PHENERGAN) 25 MG tablet Take 1 tablet (25 mg total) by mouth every 6 (six) hours as needed for Nausea.    tiZANidine (ZANAFLEX) 4 MG tablet Take 4 mg by mouth 2 (two) times a day.    topiramate (TOPAMAX) 50 MG tablet Take 50 mg by mouth 2 (two) times daily.    oxyCODONE (ROXICODONE) 15 MG Tab Take 15 mg by mouth 3 (three) times daily as needed.     Family History       Problem Relation (Age of Onset)    Stroke Father          Tobacco Use    Smoking status: Never    Smokeless tobacco: Never   Substance and Sexual Activity    Alcohol use: No    Drug use: No    Sexual activity: Yes     Partners: Male     Birth control/protection: None, Surgical     Review of Systems   Unable to perform ROS: Mental status change   All other systems reviewed and are negative.    Objective:     Vital Signs (Most Recent):  Temp: 99.6 °F (37.6 °C) (09/29/23 0017)  Pulse: 89 (09/29/23 0017)  Resp: 18 (09/29/23 0017)  BP: (!) 122/59 (09/29/23 0017)  SpO2: 95 % (09/29/23 0017) Vital Signs (24h Range):  Temp:  [98.7 °F (37.1 °C)-100 °F (37.8 °C)] 99.6 °F (37.6 °C)  Pulse:  [77-89] 89  Resp:  [18-24] 18  SpO2:  [93 %-96 %] 95 %  BP: (109-135)/(51-74) 122/59     Weight: 97.8 kg (215 lb 9.8 oz)  Body mass index is 33.77 kg/m².     Physical Exam  Constitutional:       General: She is not in acute distress.     Appearance: She is obese. She is ill-appearing. She is not toxic-appearing or diaphoretic.   HENT:      Head: Normocephalic and atraumatic.      Right Ear: External ear normal.      Left Ear: External ear normal.      Nose: Nose normal. No congestion or rhinorrhea.      Mouth/Throat:      Mouth: Mucous membranes are moist.      Pharynx: Oropharynx is clear. No oropharyngeal exudate or posterior oropharyngeal erythema.   Eyes:      General: No scleral icterus.     Extraocular  "Movements: Extraocular movements intact.      Conjunctiva/sclera: Conjunctivae normal.      Pupils: Pupils are equal, round, and reactive to light.   Neck:      Vascular: No carotid bruit.   Cardiovascular:      Rate and Rhythm: Normal rate and regular rhythm.      Pulses: Normal pulses.      Heart sounds: Normal heart sounds. No murmur heard.     No friction rub. No gallop.   Pulmonary:      Effort: Pulmonary effort is normal. No respiratory distress.      Breath sounds: Normal breath sounds. No stridor. No wheezing, rhonchi or rales.   Chest:      Chest wall: No tenderness.   Abdominal:      General: Abdomen is flat. Bowel sounds are normal. There is no distension.      Palpations: Abdomen is soft.      Tenderness: There is no abdominal tenderness. There is no right CVA tenderness, left CVA tenderness, guarding or rebound.      Hernia: No hernia is present.   Genitourinary:     Comments: Suprapubic catheter present  Musculoskeletal:         General: No swelling, tenderness, deformity or signs of injury. Normal range of motion.      Cervical back: Normal range of motion and neck supple. No rigidity or tenderness.      Right lower leg: No edema.      Left lower leg: No edema.      Comments: Patient noted to be moving all extremities sporadically   Lymphadenopathy:      Cervical: No cervical adenopathy.   Skin:     General: Skin is warm and dry.      Capillary Refill: Capillary refill takes less than 2 seconds.      Coloration: Skin is not jaundiced or pale.      Findings: No bruising, erythema, lesion or rash.   Neurological:      Mental Status: She is alert.      Cranial Nerves: No cranial nerve deficit.      Sensory: No sensory deficit.      Motor: No weakness.      Coordination: Coordination normal.      Comments: Unable to conduct full neurological assessment due to altered mental status.  Patient awake and alert but not answering questions or following commands and only applies by saying "hi".   Psychiatric:    "   Comments: Unable to assess due to current mental status              CRANIAL NERVES     CN III, IV, VI   Pupils are equal, round, and reactive to light.       Significant Labs: All pertinent labs within the past 24 hours have been reviewed.    Significant Imaging: I have reviewed all pertinent imaging results/findings within the past 24 hours.    LABS:  Recent Results (from the past 24 hour(s))   CBC auto differential    Collection Time: 09/28/23  3:24 PM   Result Value Ref Range    WBC 18.54 (H) 3.90 - 12.70 K/uL    RBC 4.91 4.00 - 5.40 M/uL    Hemoglobin 14.7 12.0 - 16.0 g/dL    Hematocrit 46.6 37.0 - 48.5 %    MCV 95 82 - 98 fL    MCH 29.9 27.0 - 31.0 pg    MCHC 31.5 (L) 32.0 - 36.0 g/dL    RDW 13.6 11.5 - 14.5 %    Platelets 257 150 - 450 K/uL    MPV 9.1 (L) 9.2 - 12.9 fL    Immature Granulocytes 0.8 (H) 0.0 - 0.5 %    Gran # (ANC) 15.0 (H) 1.8 - 7.7 K/uL    Immature Grans (Abs) 0.14 (H) 0.00 - 0.04 K/uL    Lymph # 1.6 1.0 - 4.8 K/uL    Mono # 1.7 (H) 0.3 - 1.0 K/uL    Eos # 0.1 0.0 - 0.5 K/uL    Baso # 0.02 0.00 - 0.20 K/uL    nRBC 0 0 /100 WBC    Gran % 81.0 (H) 38.0 - 73.0 %    Lymph % 8.6 (L) 18.0 - 48.0 %    Mono % 9.2 4.0 - 15.0 %    Eosinophil % 0.3 0.0 - 8.0 %    Basophil % 0.1 0.0 - 1.9 %    Differential Method Automated    Comprehensive metabolic panel    Collection Time: 09/28/23  3:24 PM   Result Value Ref Range    Sodium 140 136 - 145 mmol/L    Potassium 4.2 3.5 - 5.1 mmol/L    Chloride 109 95 - 110 mmol/L    CO2 18 (L) 23 - 29 mmol/L    Glucose 118 (H) 70 - 110 mg/dL    BUN 39 (H) 8 - 23 mg/dL    Creatinine 3.7 (H) 0.5 - 1.4 mg/dL    Calcium 9.7 8.7 - 10.5 mg/dL    Total Protein 7.9 6.0 - 8.4 g/dL    Albumin 3.9 3.5 - 5.2 g/dL    Total Bilirubin 0.7 0.1 - 1.0 mg/dL    Alkaline Phosphatase 85 55 - 135 U/L    AST 49 (H) 10 - 40 U/L    ALT 31 10 - 44 U/L    eGFR 12 (A) >60 mL/min/1.73 m^2    Anion Gap 13 8 - 16 mmol/L   Lactic acid, plasma #1    Collection Time: 09/28/23  3:24 PM   Result Value Ref  Range    Lactate (Lactic Acid) 1.7 0.5 - 2.2 mmol/L   Troponin I    Collection Time: 09/28/23  3:24 PM   Result Value Ref Range    Troponin I 6.180 (H) 0.000 - 0.026 ng/mL   Procalcitonin    Collection Time: 09/28/23  3:24 PM   Result Value Ref Range    Procalcitonin 0.22 <0.25 ng/mL   Urinalysis, Reflex to Urine Culture Urine, Clean Catch    Collection Time: 09/28/23  4:22 PM    Specimen: Urine   Result Value Ref Range    Specimen UA Urine, Clean Catch     Color, UA Yellow Yellow, Straw, Janet    Appearance, UA Hazy (A) Clear    pH, UA 6.0 5.0 - 8.0    Specific Gravity, UA 1.020 1.005 - 1.030    Protein, UA 2+ (A) Negative    Glucose, UA Negative Negative    Ketones, UA Negative Negative    Bilirubin (UA) Negative Negative    Occult Blood UA 1+ (A) Negative    Nitrite, UA Negative Negative    Urobilinogen, UA Negative <2.0 EU/dL    Leukocytes, UA 3+ (A) Negative   Urinalysis Microscopic    Collection Time: 09/28/23  4:22 PM   Result Value Ref Range    RBC, UA 9 (H) 0 - 4 /hpf    WBC, UA 64 (H) 0 - 5 /hpf    WBC Clumps, UA Many (A) None-Rare    Bacteria Occasional None-Occ /hpf    Yeast, UA Rare (A) None    Hyaline Casts, UA 0 0-1/lpf /lpf    Microscopic Comment SEE COMMENT    Lactic acid, plasma #2    Collection Time: 09/28/23  7:04 PM   Result Value Ref Range    Lactate (Lactic Acid) 1.0 0.5 - 2.2 mmol/L   Troponin I    Collection Time: 09/28/23  7:51 PM   Result Value Ref Range    Troponin I 4.910 (H) 0.000 - 0.026 ng/mL       RADIOLOGY  X-Ray Shoulder Complete 2 View Right    Result Date: 9/28/2023  EXAMINATION: XR SHOULDER COMPLETE 2 OR MORE VIEWS RIGHT CLINICAL HISTORY: Pain, unspecified TECHNIQUE: Two or three views of the right shoulder were performed. COMPARISON: None FINDINGS: No acute fracture or dislocation.  Senescent changes.  Degenerative joint disease.  Cervical hardware     No acute process identified.  Multiple chronic findings Electronically signed by: Horacio Lima Date:    09/28/2023  Time:    19:09    X-Ray Humerus 2 View Right    Result Date: 9/28/2023  EXAMINATION: XR HUMERUS 2 VIEW RIGHT CLINICAL HISTORY: XR HUMERUS 2 VIEW RIGHTPain, unspecified COMPARISON: None FINDINGS: Multiple radiographic views  were obtained. No evidence of acute fracture or dislocation.  Decreased bone mineral density.     No acute abnormality. Electronically signed by: Horacio Lima Date:    09/28/2023 Time:    18:40    X-Ray Hand 3 view Right    Result Date: 9/28/2023  EXAMINATION: XR HAND COMPLETE 3 VIEW RIGHT CLINICAL HISTORY: pain; TECHNIQUE: PA, lateral, and oblique views of the right hand were performed. COMPARISON: None FINDINGS: No acute fracture or dislocation.  Degenerative joint disease.     No definite acute abnormality.  Recommend follow-up if symptoms persist Electronically signed by: Horacio Lima Date:    09/28/2023 Time:    18:32    X-Ray Forearm Right    Result Date: 9/28/2023  EXAMINATION: XR FOREARM RIGHT CLINICAL HISTORY: Pain, unspecified TECHNIQUE: AP and lateral views of the right forearm were performed. COMPARISON: None FINDINGS: No evidence for fracture or dislocation.  Mild degenerative joint disease.     No acute fracture or dislocation Electronically signed by: Horacio Lima Date:    09/28/2023 Time:    18:30    US Upper Extremity Veins Right    Result Date: 9/28/2023  EXAMINATION: US UPPER EXTREMITY VEINS RIGHT CLINICAL HISTORY: pain; TECHNIQUE: Duplex and color flow Doppler evaluation and dynamic compression was performed of the right upper extremity veins. COMPARISON: None FINDINGS: Central veins: The internal jugular, subclavian, and axillary veins are patent and free of thrombus. Arm veins: The brachial, basilic, and cephalic veins are patent and compressible. Other findings: None.     No thrombus in central veins of the right upper extremity. Electronically signed by: Valerie Wong Date:    09/28/2023 Time:    17:52    CT Head Without Contrast    Result Date:  9/28/2023  EXAMINATION: CT HEAD WITHOUT CONTRAST CLINICAL HISTORY: Mental status change, unknown cause; TECHNIQUE: Noncontrast CT was performed COMPARISON: CT brain 09/26/2023. FINDINGS: No intracranial acute hemorrhage or acute focal brain parenchymal abnormality is identified.  Calvarium is intact.  Stable partial opacification of the right sphenoid sinus.     No acute intracranial abnormality. All CT scans at this facility use dose modulation, iterative reconstructions, and/or weight base dosing when appropriate to reduce radiation dose to as low as reasonably achievable. Electronically signed by: Goyo Fernandes MD Date:    09/28/2023 Time:    16:55    X-Ray Chest AP Portable    Result Date: 9/28/2023  EXAMINATION: XR CHEST AP PORTABLE CLINICAL HISTORY: , Sepsis; COMPARISON: Comparison 09/26/2023. FINDINGS: One view.  Stable cardiomegaly.  The lung fields are clear. No acute cardiopulmonary infiltrate.     Clear lungs. Electronically signed by: Goyo Fernandes MD Date:    09/28/2023 Time:    15:13    CT Head Without Contrast    Result Date: 9/26/2023  EXAMINATION: CT HEAD WITHOUT CONTRAST CLINICAL HISTORY: Mental status change, unknown cause; TECHNIQUE: Low dose axial CT images obtained throughout the head without intravenous contrast. Sagittal and coronal reconstructions were performed. COMPARISON: None. FINDINGS: Intracranial compartment: Ventricles and sulci are normal in size for age without evidence of hydrocephalus. No extra-axial blood or fluid collections. No parenchymal mass, hemorrhage, edema or major vascular distribution infarct. Skull/extracranial contents (limited evaluation): No fracture. Mild sphenoid sinus opacification.     No intracranial hemorrhage mass effect or midline shift Mild sphenoid sinus opacification. All CT scans   are performed using dose optimization techniques including the following: automated exposure control; adjustment of the mA and/or kV; use of iterative reconstruction  technique.  Dose modulation was employed for YOGI by means of: Automated exposure control; adjustment of the mA and/or kV according to patient size (this includes techniques or standardized protocols for targeted exams where dose is matched to indication/reason for exam; i.e. extremities or head); and/or use of iterative reconstructive technique. Electronically signed by: Horacio Lima Date:    09/26/2023 Time:    19:44    X-Ray Chest AP Portable    Result Date: 9/26/2023  EXAMINATION: XR CHEST AP PORTABLE CLINICAL HISTORY: Sepsis; TECHNIQUE: Single frontal view of the chest was performed. COMPARISON: None FINDINGS: Cervical hardware.  Cardiomegaly.  Minimal central pulmonary vascular crowding.  Correlate clinically for low-grade or stable CHF. Bones are intact.     As above Electronically signed by: Horacio Lima Date:    09/26/2023 Time:    18:59      EKG    MICROBIOLOGY    [facility]      Assessment/Plan:     * Altered mental status  Patient presented with altered mental status while meeting SIRS criteria for sepsis and was found to have evidence of UTI on UA.  CT head negative for acute intracranial abnormalities.  Patient initiated on Rocephin with cultures obtained and pending.  Plan:  -NPO  -fall precautions  -delirium/dementia precautions  -IVFs  -Tylenol p.r.n. for fever  -antiemetics p.r.n.  -continue treatment of sepsis       Sepsis  This patient does have evidence of infective focus  My overall impression is sepsis.  Source: Urinary Tract  Antibiotics given-   Antibiotics (72h ago, onward)    Start     Stop Route Frequency Ordered    09/29/23 1600  cefTRIAXone (ROCEPHIN) 1 g in dextrose 5 % in water (D5W) 100 mL IVPB (MB+)  ( Urinary Tract Infection (UTI))         10/04/23 1559 IV Every 24 hours (non-standard times) 09/28/23 1944        Latest lactate reviewed-  Recent Labs   Lab 09/26/23  1915 09/28/23  1524 09/28/23  1904   LACTATE 1.9 1.7 1.0     Organ dysfunction indicated by Acute kidney injury and  Encephalopathy    Fluid challenge Ideal Body Weight- The patient's ideal body weight is Ideal body weight: 61.6 kg (135 lb 12.9 oz) which will be used to calculate fluid bolus of 30 ml/kg for treatment of septic shock.      Post- resuscitation assessment No - Post resuscitation assessment not needed     Will Not start Pressors- Levophed for MAP of 65  Source control achieved by: Rocephin      Suprapubic catheter        Neurogenic bladder  Patient with known neurogenic bladder status post suprapubic catheter currently undergoes exchange every 2 weeks.  Patient also reportedly suffers from recurrent UTIs.  Patient initiated on Rocephin with cultures obtained and pending.  Plan:  -continue antibiotics  -f/u cultures  -urology consulted for catheter exchange      AMANDA (acute kidney injury)  Patient with acute kidney injury/acute renal failure likely due to pre-renal azotemia due to dehydration AMANDA is currently currently receiving IVFs and being treated for UTI. Baseline creatinine wnl - Labs reviewed- Renal function/electrolytes with Estimated Creatinine Clearance: 15.8 mL/min (A) (based on SCr of 3.7 mg/dL (H)). according to latest data. Monitor urine output and serial BMP and adjust therapy as needed. Avoid nephrotoxins and renally dose meds for GFR listed above.      Elevated troponin  Patient found to have elevated troponin measuring 6.180 with repeat measuring 4.910 in absence of reported chest pain.  EKG without evidence of acute ischemia noted.  Likely secondary to acute renal failure however, patient was previously seen in ED on 09/26 for presumed syncope/cardiac arrest/accidental drug overdose.  Plan:  -Telemetry  -serial EKGs   -trend troponin levels  -cardiology consult pending clinical course       Obesity due to excess calories with serious comorbidity  Body mass index is 33.77 kg/m². Morbid obesity complicates all aspects of disease management from diagnostic modalities to treatment. Weight loss encouraged  and health benefits explained to patient.       VTE Risk Mitigation (From admission, onward)         Ordered     enoxaparin injection 30 mg  Daily         09/28/23 1944     IP VTE HIGH RISK PATIENT  Once         09/28/23 1944     Place sequential compression device  Until discontinued         09/28/23 1944              //Core Measures   -DVT proph: SCDs, Lovenox   -Code status: Full    -Surrogate: none provided       Components of this note were documented using a voice recognition system and are subject to errors not corrected at the time the document was proof read. Please contact the author for any clarifications.        On 09/29/2023, patient should be placed in hospital observation services under my care.      Ravinder High MD  Department of Hospital Medicine  [facility]

## 2023-09-29 NOTE — SUBJECTIVE & OBJECTIVE
Past Medical History:   Diagnosis Date    DDD (degenerative disc disease), lumbar 4/11/2014    Hypertension     Neurogenic bladder     Pulmonary embolism 4/11/2014       Past Surgical History:   Procedure Laterality Date    AUGMENTATION OF BREAST      BACK SURGERY      CERVICAL FUSION      CHOLECYSTECTOMY      HYSTERECTOMY  1974    menorrhagia    INCISION AND DRAINAGE OF ABSCESS Right 10/16/2018    Procedure: INCISION AND DRAINAGE, ABSCESS;  Surgeon: Miguel Verduzco MD;  Location: [facility];  Service: General;  Laterality: Right;    INSERTION OF SUPRAPUBIC CATHETER      JOINT REPLACEMENT      neck fusion      OOPHORECTOMY         Review of patient's allergies indicates:   Allergen Reactions    Nucynta [tapentadol] Nausea And Vomiting    Prochlorperazine Nausea And Vomiting    Stadol [butorphanol tartrate] Nausea And Vomiting    Ibuprofen     Metoclopramide     Reglan [metoclopramide hcl]     Toradol [ketorolac]     Zofran [ondansetron hcl (pf)]        No current facility-administered medications on file prior to encounter.     Current Outpatient Medications on File Prior to Encounter   Medication Sig    aspirin (ECOTRIN) 81 MG EC tablet Take 1 tablet (81 mg total) by mouth once daily.    estradioL (ESTRACE) 0.01 % (0.1 mg/gram) vaginal cream Place 2 g vaginally 3 (three) times a week.    eszopiclone (LUNESTA) 2 MG Tab Take 2 mg by mouth every evening.    gabapentin (NEURONTIN) 600 MG tablet Take 600 mg by mouth 2 (two) times daily.    LIDOcaine (LIDODERM) 5 % Place 1 patch onto the skin 3 (three) times daily as needed.    losartan (COZAAR) 50 MG tablet Take 50 mg by mouth every morning.    oxybutynin (DITROPAN-XL) 10 MG 24 hr tablet Take 10 mg by mouth once daily.    promethazine (PHENERGAN) 25 MG tablet Take 1 tablet (25 mg total) by mouth every 6 (six) hours as needed for Nausea.    tiZANidine (ZANAFLEX) 4 MG tablet Take 4 mg by mouth 2 (two) times a day.    topiramate (TOPAMAX) 50 MG tablet Take 50 mg by mouth 2  (two) times daily.    oxyCODONE (ROXICODONE) 15 MG Tab Take 15 mg by mouth 3 (three) times daily as needed.     Family History       Problem Relation (Age of Onset)    Stroke Father          Tobacco Use    Smoking status: Never    Smokeless tobacco: Never   Substance and Sexual Activity    Alcohol use: No    Drug use: No    Sexual activity: Yes     Partners: Male     Birth control/protection: None, Surgical     Review of Systems   Unable to perform ROS: Mental status change   All other systems reviewed and are negative.    Objective:     Vital Signs (Most Recent):  Temp: 99.6 °F (37.6 °C) (09/29/23 0017)  Pulse: 89 (09/29/23 0017)  Resp: 18 (09/29/23 0017)  BP: (!) 122/59 (09/29/23 0017)  SpO2: 95 % (09/29/23 0017) Vital Signs (24h Range):  Temp:  [98.7 °F (37.1 °C)-100 °F (37.8 °C)] 99.6 °F (37.6 °C)  Pulse:  [77-89] 89  Resp:  [18-24] 18  SpO2:  [93 %-96 %] 95 %  BP: (109-135)/(51-74) 122/59     Weight: 97.8 kg (215 lb 9.8 oz)  Body mass index is 33.77 kg/m².     Physical Exam  Constitutional:       General: She is not in acute distress.     Appearance: She is obese. She is ill-appearing. She is not toxic-appearing or diaphoretic.   HENT:      Head: Normocephalic and atraumatic.      Right Ear: External ear normal.      Left Ear: External ear normal.      Nose: Nose normal. No congestion or rhinorrhea.      Mouth/Throat:      Mouth: Mucous membranes are moist.      Pharynx: Oropharynx is clear. No oropharyngeal exudate or posterior oropharyngeal erythema.   Eyes:      General: No scleral icterus.     Extraocular Movements: Extraocular movements intact.      Conjunctiva/sclera: Conjunctivae normal.      Pupils: Pupils are equal, round, and reactive to light.   Neck:      Vascular: No carotid bruit.   Cardiovascular:      Rate and Rhythm: Normal rate and regular rhythm.      Pulses: Normal pulses.      Heart sounds: Normal heart sounds. No murmur heard.     No friction rub. No gallop.   Pulmonary:      Effort:  "Pulmonary effort is normal. No respiratory distress.      Breath sounds: Normal breath sounds. No stridor. No wheezing, rhonchi or rales.   Chest:      Chest wall: No tenderness.   Abdominal:      General: Abdomen is flat. Bowel sounds are normal. There is no distension.      Palpations: Abdomen is soft.      Tenderness: There is no abdominal tenderness. There is no right CVA tenderness, left CVA tenderness, guarding or rebound.      Hernia: No hernia is present.   Genitourinary:     Comments: Suprapubic catheter present  Musculoskeletal:         General: No swelling, tenderness, deformity or signs of injury. Normal range of motion.      Cervical back: Normal range of motion and neck supple. No rigidity or tenderness.      Right lower leg: No edema.      Left lower leg: No edema.      Comments: Patient noted to be moving all extremities sporadically   Lymphadenopathy:      Cervical: No cervical adenopathy.   Skin:     General: Skin is warm and dry.      Capillary Refill: Capillary refill takes less than 2 seconds.      Coloration: Skin is not jaundiced or pale.      Findings: No bruising, erythema, lesion or rash.   Neurological:      Mental Status: She is alert.      Cranial Nerves: No cranial nerve deficit.      Sensory: No sensory deficit.      Motor: No weakness.      Coordination: Coordination normal.      Comments: Unable to conduct full neurological assessment due to altered mental status.  Patient awake and alert but not answering questions or following commands and only applies by saying "hi".   Psychiatric:      Comments: Unable to assess due to current mental status              CRANIAL NERVES     CN III, IV, VI   Pupils are equal, round, and reactive to light.       Significant Labs: All pertinent labs within the past 24 hours have been reviewed.    Significant Imaging: I have reviewed all pertinent imaging results/findings within the past 24 hours.    LABS:  Recent Results (from the past 24 hour(s)) "   CBC auto differential    Collection Time: 09/28/23  3:24 PM   Result Value Ref Range    WBC 18.54 (H) 3.90 - 12.70 K/uL    RBC 4.91 4.00 - 5.40 M/uL    Hemoglobin 14.7 12.0 - 16.0 g/dL    Hematocrit 46.6 37.0 - 48.5 %    MCV 95 82 - 98 fL    MCH 29.9 27.0 - 31.0 pg    MCHC 31.5 (L) 32.0 - 36.0 g/dL    RDW 13.6 11.5 - 14.5 %    Platelets 257 150 - 450 K/uL    MPV 9.1 (L) 9.2 - 12.9 fL    Immature Granulocytes 0.8 (H) 0.0 - 0.5 %    Gran # (ANC) 15.0 (H) 1.8 - 7.7 K/uL    Immature Grans (Abs) 0.14 (H) 0.00 - 0.04 K/uL    Lymph # 1.6 1.0 - 4.8 K/uL    Mono # 1.7 (H) 0.3 - 1.0 K/uL    Eos # 0.1 0.0 - 0.5 K/uL    Baso # 0.02 0.00 - 0.20 K/uL    nRBC 0 0 /100 WBC    Gran % 81.0 (H) 38.0 - 73.0 %    Lymph % 8.6 (L) 18.0 - 48.0 %    Mono % 9.2 4.0 - 15.0 %    Eosinophil % 0.3 0.0 - 8.0 %    Basophil % 0.1 0.0 - 1.9 %    Differential Method Automated    Comprehensive metabolic panel    Collection Time: 09/28/23  3:24 PM   Result Value Ref Range    Sodium 140 136 - 145 mmol/L    Potassium 4.2 3.5 - 5.1 mmol/L    Chloride 109 95 - 110 mmol/L    CO2 18 (L) 23 - 29 mmol/L    Glucose 118 (H) 70 - 110 mg/dL    BUN 39 (H) 8 - 23 mg/dL    Creatinine 3.7 (H) 0.5 - 1.4 mg/dL    Calcium 9.7 8.7 - 10.5 mg/dL    Total Protein 7.9 6.0 - 8.4 g/dL    Albumin 3.9 3.5 - 5.2 g/dL    Total Bilirubin 0.7 0.1 - 1.0 mg/dL    Alkaline Phosphatase 85 55 - 135 U/L    AST 49 (H) 10 - 40 U/L    ALT 31 10 - 44 U/L    eGFR 12 (A) >60 mL/min/1.73 m^2    Anion Gap 13 8 - 16 mmol/L   Lactic acid, plasma #1    Collection Time: 09/28/23  3:24 PM   Result Value Ref Range    Lactate (Lactic Acid) 1.7 0.5 - 2.2 mmol/L   Troponin I    Collection Time: 09/28/23  3:24 PM   Result Value Ref Range    Troponin I 6.180 (H) 0.000 - 0.026 ng/mL   Procalcitonin    Collection Time: 09/28/23  3:24 PM   Result Value Ref Range    Procalcitonin 0.22 <0.25 ng/mL   Urinalysis, Reflex to Urine Culture Urine, Clean Catch    Collection Time: 09/28/23  4:22 PM    Specimen: Urine    Result Value Ref Range    Specimen UA Urine, Clean Catch     Color, UA Yellow Yellow, Straw, Janet    Appearance, UA Hazy (A) Clear    pH, UA 6.0 5.0 - 8.0    Specific Gravity, UA 1.020 1.005 - 1.030    Protein, UA 2+ (A) Negative    Glucose, UA Negative Negative    Ketones, UA Negative Negative    Bilirubin (UA) Negative Negative    Occult Blood UA 1+ (A) Negative    Nitrite, UA Negative Negative    Urobilinogen, UA Negative <2.0 EU/dL    Leukocytes, UA 3+ (A) Negative   Urinalysis Microscopic    Collection Time: 09/28/23  4:22 PM   Result Value Ref Range    RBC, UA 9 (H) 0 - 4 /hpf    WBC, UA 64 (H) 0 - 5 /hpf    WBC Clumps, UA Many (A) None-Rare    Bacteria Occasional None-Occ /hpf    Yeast, UA Rare (A) None    Hyaline Casts, UA 0 0-1/lpf /lpf    Microscopic Comment SEE COMMENT    Lactic acid, plasma #2    Collection Time: 09/28/23  7:04 PM   Result Value Ref Range    Lactate (Lactic Acid) 1.0 0.5 - 2.2 mmol/L   Troponin I    Collection Time: 09/28/23  7:51 PM   Result Value Ref Range    Troponin I 4.910 (H) 0.000 - 0.026 ng/mL       RADIOLOGY  X-Ray Shoulder Complete 2 View Right    Result Date: 9/28/2023  EXAMINATION: XR SHOULDER COMPLETE 2 OR MORE VIEWS RIGHT CLINICAL HISTORY: Pain, unspecified TECHNIQUE: Two or three views of the right shoulder were performed. COMPARISON: None FINDINGS: No acute fracture or dislocation.  Senescent changes.  Degenerative joint disease.  Cervical hardware     No acute process identified.  Multiple chronic findings Electronically signed by: Horacio Lima Date:    09/28/2023 Time:    19:09    X-Ray Humerus 2 View Right    Result Date: 9/28/2023  EXAMINATION: XR HUMERUS 2 VIEW RIGHT CLINICAL HISTORY: XR HUMERUS 2 VIEW RIGHTPain, unspecified COMPARISON: None FINDINGS: Multiple radiographic views  were obtained. No evidence of acute fracture or dislocation.  Decreased bone mineral density.     No acute abnormality. Electronically signed by: Horacio Lima Date:    09/28/2023  Time:    18:40    X-Ray Hand 3 view Right    Result Date: 9/28/2023  EXAMINATION: XR HAND COMPLETE 3 VIEW RIGHT CLINICAL HISTORY: pain; TECHNIQUE: PA, lateral, and oblique views of the right hand were performed. COMPARISON: None FINDINGS: No acute fracture or dislocation.  Degenerative joint disease.     No definite acute abnormality.  Recommend follow-up if symptoms persist Electronically signed by: Horacio Lima Date:    09/28/2023 Time:    18:32    X-Ray Forearm Right    Result Date: 9/28/2023  EXAMINATION: XR FOREARM RIGHT CLINICAL HISTORY: Pain, unspecified TECHNIQUE: AP and lateral views of the right forearm were performed. COMPARISON: None FINDINGS: No evidence for fracture or dislocation.  Mild degenerative joint disease.     No acute fracture or dislocation Electronically signed by: Horacio Lima Date:    09/28/2023 Time:    18:30    US Upper Extremity Veins Right    Result Date: 9/28/2023  EXAMINATION: US UPPER EXTREMITY VEINS RIGHT CLINICAL HISTORY: pain; TECHNIQUE: Duplex and color flow Doppler evaluation and dynamic compression was performed of the right upper extremity veins. COMPARISON: None FINDINGS: Central veins: The internal jugular, subclavian, and axillary veins are patent and free of thrombus. Arm veins: The brachial, basilic, and cephalic veins are patent and compressible. Other findings: None.     No thrombus in central veins of the right upper extremity. Electronically signed by: Valerie Wong Date:    09/28/2023 Time:    17:52    CT Head Without Contrast    Result Date: 9/28/2023  EXAMINATION: CT HEAD WITHOUT CONTRAST CLINICAL HISTORY: Mental status change, unknown cause; TECHNIQUE: Noncontrast CT was performed COMPARISON: CT brain 09/26/2023. FINDINGS: No intracranial acute hemorrhage or acute focal brain parenchymal abnormality is identified.  Calvarium is intact.  Stable partial opacification of the right sphenoid sinus.     No acute intracranial abnormality. All CT scans at this  facility use dose modulation, iterative reconstructions, and/or weight base dosing when appropriate to reduce radiation dose to as low as reasonably achievable. Electronically signed by: Goyo Fernandes MD Date:    09/28/2023 Time:    16:55    X-Ray Chest AP Portable    Result Date: 9/28/2023  EXAMINATION: XR CHEST AP PORTABLE CLINICAL HISTORY: , Sepsis; COMPARISON: Comparison 09/26/2023. FINDINGS: One view.  Stable cardiomegaly.  The lung fields are clear. No acute cardiopulmonary infiltrate.     Clear lungs. Electronically signed by: Goyo Fernandes MD Date:    09/28/2023 Time:    15:13    CT Head Without Contrast    Result Date: 9/26/2023  EXAMINATION: CT HEAD WITHOUT CONTRAST CLINICAL HISTORY: Mental status change, unknown cause; TECHNIQUE: Low dose axial CT images obtained throughout the head without intravenous contrast. Sagittal and coronal reconstructions were performed. COMPARISON: None. FINDINGS: Intracranial compartment: Ventricles and sulci are normal in size for age without evidence of hydrocephalus. No extra-axial blood or fluid collections. No parenchymal mass, hemorrhage, edema or major vascular distribution infarct. Skull/extracranial contents (limited evaluation): No fracture. Mild sphenoid sinus opacification.     No intracranial hemorrhage mass effect or midline shift Mild sphenoid sinus opacification. All CT scans   are performed using dose optimization techniques including the following: automated exposure control; adjustment of the mA and/or kV; use of iterative reconstruction technique.  Dose modulation was employed for ALARA by means of: Automated exposure control; adjustment of the mA and/or kV according to patient size (this includes techniques or standardized protocols for targeted exams where dose is matched to indication/reason for exam; i.e. extremities or head); and/or use of iterative reconstructive technique. Electronically signed by: Horacio Lima Date:    09/26/2023  Time:    19:44    X-Ray Chest AP Portable    Result Date: 9/26/2023  EXAMINATION: XR CHEST AP PORTABLE CLINICAL HISTORY: Sepsis; TECHNIQUE: Single frontal view of the chest was performed. COMPARISON: None FINDINGS: Cervical hardware.  Cardiomegaly.  Minimal central pulmonary vascular crowding.  Correlate clinically for low-grade or stable CHF. Bones are intact.     As above Electronically signed by: Horacio Lima Date:    09/26/2023 Time:    18:59      EKG    MICROBIOLOGY    MDM

## 2023-09-29 NOTE — ASSESSMENT & PLAN NOTE
Body mass index is 33.77 kg/m². Morbid obesity complicates all aspects of disease management from diagnostic modalities to treatment. Weight loss encouraged and health benefits explained to patient.

## 2023-09-29 NOTE — PLAN OF CARE
Plan of care reviewed with patient with no verbal understanding. Chart and orders reviewed.  Pt remains free from falls this shift, Safety precautions in place.   Pt currently resting comfortably in bed. Hourly rounding with bed in lowest position, side rails up, [employer]as[employer] is at bedside and call light in reach.  Will continue to monitor until end of shift.       Problem: Adult Inpatient Plan of Care  Goal: Plan of Care Review  Flowsheets (Taken 9/29/2023 0355)  Plan of Care Reviewed With: patient  Goal: Patient-Specific Goal (Individualized)  Flowsheets (Taken 9/29/2023 0355)  Anxieties, Fears or Concerns: (Connie slow to respond to yes no questions) none  Individualized Care Needs: turn q2h     Problem: Skin Injury Risk Increased  Goal: Skin Health and Integrity  Outcome: Ongoing, Progressing

## 2023-09-29 NOTE — PROGRESS NOTES
"[facility]  Progress Note    Patient Name: Desirae No  MRN: [id number]  Patient Class: IP- Inpatient   Admission Date: 9/28/2023  Length of Stay: 0 days  Attending Physician: Dennis Garcia MD  Primary Care Provider: Iram Gomez NP        Subjective:     Principal Problem:Altered mental status        HPI:  Desirae No is a 73 y.o. female with a PMH  has a past medical history of DDD (degenerative disc disease), lumbar (4/11/2014), Hypertension, Neurogenic bladder, and Pulmonary embolism (4/11/2014). who presented to the ED via EMS for further evaluation of altered mental status x2 days duration.  Patient was seen in the ED on 09/26 following syncope episode and potential accidental drug overdose.  Patient received CPR for approximately 10 minutes after EMS was unable to palpate pulse and was administered Narcan with patient becoming responsive.  Patient was treated in the ED and discharged back home with diagnosis of UTI.  Per chart review, patient usually endorses syncope episodes while using the restroom.  At time of discharge, patient was initiated on cefdinir and had progressively worsening mental status since returning home despite compliance with medications.  History was obtained through chart review and ED sign-out S patient continues to be altered at time of bedside assessment and only responding "hi" intermittently and not following commands.  Attempted to contact family without success.  EMS earlier tonight also reported questionable right-sided weakness but unable to confirm this due to current mentation.  Initial workup in the ED revealed patient met SIRS criteria for sepsis with UA positive for UTI, troponin elevated measuring 6.180 in absence of ischemia on EKG, in addition to acute renal failure.  CT head negative for acute intracranial abnormalities.  Patient admitted to Hospital Medicine inpatient for continued medical management.    PCP: Iram Gomez " [facility].        Overview/Hospital Course:  9/29: pt febrile this am, will add vanc. Cont rocephin. Should pt continue to be febrile may need to consider LP should infectious source other than UTI be suspected. Troponins elevated. Pt received compressions a couple days ago, will consult cards on case.       Interval History: No issues reported overnight.     Review of Systems   Unable to perform ROS: Mental status change     Objective:     Vital Signs (Most Recent):  Temp: (!) 101.1 °F (38.4 °C) (09/29/23 1001)  Pulse: 83 (09/29/23 0801)  Resp: 18 (09/29/23 0801)  BP: 123/60 (09/29/23 0801)  SpO2: (!) 94 % (09/29/23 0801) Vital Signs (24h Range):  Temp:  [98.7 °F (37.1 °C)-101.1 °F (38.4 °C)] 101.1 °F (38.4 °C)  Pulse:  [77-89] 83  Resp:  [18-24] 18  SpO2:  [93 %-96 %] 94 %  BP: (109-135)/(51-74) 123/60     Weight: 97.8 kg (215 lb 9.8 oz)  Body mass index is 33.77 kg/m².    Intake/Output Summary (Last 24 hours) at 9/29/2023 1052  Last data filed at 9/29/2023 0400  Gross per 24 hour   Intake 0 ml   Output 1400 ml   Net -1400 ml         Physical Exam  Constitutional:       General: She is not in acute distress.     Appearance: She is well-developed. She is not diaphoretic.   HENT:      Head: Normocephalic and atraumatic.   Eyes:      Pupils: Pupils are equal, round, and reactive to light.   Cardiovascular:      Rate and Rhythm: Normal rate and regular rhythm.      Heart sounds: Normal heart sounds. No murmur heard.     No friction rub. No gallop.   Pulmonary:      Effort: Pulmonary effort is normal. No respiratory distress.      Breath sounds: Normal breath sounds. No stridor. No wheezing or rales.   Abdominal:      General: Bowel sounds are normal. There is no distension.      Palpations: Abdomen is soft. There is no mass.      Tenderness: There is no abdominal tenderness. There is no guarding.   Genitourinary:     Comments: Suprapubic catheter  Skin:     General: Skin is warm.      Findings: No erythema.   Neurological:       Mental Status: She is alert. She is disoriented.             Significant Labs: All pertinent labs within the past 24 hours have been reviewed.    Significant Imaging: I have reviewed all pertinent imaging results/findings within the past 24 hours.        Assessment/Plan:      * Altered mental status  Patient presented with altered mental status while meeting SIRS criteria for sepsis and was found to have evidence of UTI on UA.  CT head negative for acute intracranial abnormalities.  Patient initiated on Rocephin with cultures obtained and pending.  Plan:  -NPO  -fall precautions  -delirium/dementia precautions  -IVFs  -Tylenol p.r.n. for fever  -antiemetics p.r.n.  -continue treatment of sepsis     9/29:  Likely secondary to sepsis from uti  Should mentation not improve and   She continues to be febrile, may need LP      Obesity due to excess calories with serious comorbidity  Body mass index is 33.77 kg/m². Morbid obesity complicates all aspects of disease management from diagnostic modalities to treatment. Weight loss encouraged and health benefits explained to patient.       Sepsis  This patient does have evidence of infective focus  My overall impression is sepsis.  Source: Urinary Tract  Antibiotics given-   Antibiotics (72h ago, onward)    Start     Stop Route Frequency Ordered    09/29/23 1600  cefTRIAXone (ROCEPHIN) 1 g in dextrose 5 % in water (D5W) 100 mL IVPB (MB+)  ( Urinary Tract Infection (UTI))         10/04/23 1559 IV Every 24 hours (non-standard times) 09/28/23 1944    09/29/23 0945  vancomycin 2 g in dextrose 5 % 500 mL IVPB         -- IV Once 09/29/23 0839    09/29/23 0920  vancomycin - pharmacy to dose  (vancomycin IVPB (PEDS and ADULTS))        See Hyperspace for full Linked Orders Report.    -- IV pharmacy to manage frequency 09/29/23 0820        Latest lactate reviewed-  Recent Labs   Lab 09/26/23  1915 09/28/23  1524 09/28/23  1904   LACTATE 1.9 1.7 1.0     Organ dysfunction indicated by  Acute kidney injury and Encephalopathy    Fluid challenge Ideal Body Weight- The patient's ideal body weight is Ideal body weight: 61.6 kg (135 lb 12.9 oz) which will be used to calculate fluid bolus of 30 ml/kg for treatment of septic shock.      Post- resuscitation assessment No - Post resuscitation assessment not needed     Will Not start Pressors- Levophed for MAP of 65  Source control achieved by: Rocephin    9/29:  Pt febrile this am  Will add vanc  Urine culture growing staph aureus     AMANDA (acute kidney injury)  Patient with acute kidney injury/acute renal failure likely due to pre-renal azotemia due to dehydration AMANDA is currently currently receiving IVFs and being treated for UTI. Baseline creatinine wnl - Labs reviewed- Renal function/electrolytes with Estimated Creatinine Clearance: 15.8 mL/min (A) (based on SCr of 3.7 mg/dL (H)). according to latest data. Monitor urine output and serial BMP and adjust therapy as needed. Avoid nephrotoxins and renally dose meds for GFR listed above.      Elevated troponin  Patient found to have elevated troponin measuring 6.180 with repeat measuring 4.910 in absence of reported chest pain.  EKG without evidence of acute ischemia noted.  Likely secondary to acute renal failure however, patient was previously seen in ED on 09/26 for presumed syncope/cardiac arrest/accidental drug overdose.  Plan:  -Telemetry  -serial EKGs   -trend troponin levels  -cardiology consult pending clinical course     9/29:  Consult cardiology   troponins trending down   Possibly due to pt receiving compressions      Suprapubic catheter        Neurogenic bladder  Patient with known neurogenic bladder status post suprapubic catheter currently undergoes exchange every 2 weeks.  Patient also reportedly suffers from recurrent UTIs.  Patient initiated on Rocephin with cultures obtained and pending.  Plan:  -continue antibiotics  -f/u cultures  -urology consulted for catheter exchange        VTE Risk  Mitigation (From admission, onward)         Ordered     enoxaparin injection 30 mg  Daily         09/28/23 1944     IP VTE HIGH RISK PATIENT  Once         09/28/23 1944     Place sequential compression device  Until discontinued         09/28/23 1944                Discharge Planning   SO:      Code Status: Full Code   Is the patient medically ready for discharge?:     Reason for patient still in hospital (select all that apply): Patient trending condition                     Dennis Garcia MD  Department of Hospital Medicine   [facility]

## 2023-09-29 NOTE — ASSESSMENT & PLAN NOTE
Patient presented with altered mental status while meeting SIRS criteria for sepsis and was found to have evidence of UTI on UA.  CT head negative for acute intracranial abnormalities.  Patient initiated on Rocephin with cultures obtained and pending.  Plan:  -NPO  -fall precautions  -delirium/dementia precautions  -IVFs  -Tylenol p.r.n. for fever  -antiemetics p.r.n.  -continue treatment of sepsis

## 2023-09-29 NOTE — ASSESSMENT & PLAN NOTE
This patient does have evidence of infective focus  My overall impression is sepsis.  Source: Urinary Tract  Antibiotics given-   Antibiotics (72h ago, onward)    Start     Stop Route Frequency Ordered    09/29/23 1600  cefTRIAXone (ROCEPHIN) 1 g in dextrose 5 % in water (D5W) 100 mL IVPB (MB+)  ( Urinary Tract Infection (UTI))         10/04/23 1559 IV Every 24 hours (non-standard times) 09/28/23 1944    09/29/23 0945  vancomycin 2 g in dextrose 5 % 500 mL IVPB         -- IV Once 09/29/23 0839    09/29/23 0920  vancomycin - pharmacy to dose  (vancomycin IVPB (PEDS and ADULTS))        See Hyperspace for full Linked Orders Report.    -- IV pharmacy to manage frequency 09/29/23 0820        Latest lactate reviewed-  Recent Labs   Lab 09/26/23  1915 09/28/23  1524 09/28/23  1904   LACTATE 1.9 1.7 1.0     Organ dysfunction indicated by Acute kidney injury and Encephalopathy    Fluid challenge Ideal Body Weight- The patient's ideal body weight is Ideal body weight: 61.6 kg (135 lb 12.9 oz) which will be used to calculate fluid bolus of 30 ml/kg for treatment of septic shock.      Post- resuscitation assessment No - Post resuscitation assessment not needed     Will Not start Pressors- Levophed for MAP of 65  Source control achieved by: Rocephin    9/29:  Pt febrile this am  Will add vanc  Urine culture growing staph aureus

## 2023-09-30 LAB
ALBUMIN SERPL BCP-MCNC: 2.9 G/DL (ref 3.5–5.2)
ALP SERPL-CCNC: 94 U/L (ref 55–135)
ALT SERPL W/O P-5'-P-CCNC: 44 U/L (ref 10–44)
AMPHET+METHAMPHET UR QL: NEGATIVE
ANION GAP SERPL CALC-SCNC: 13 MMOL/L (ref 8–16)
AST SERPL-CCNC: 32 U/L (ref 10–40)
BACTERIA UR CULT: NORMAL
BACTERIA UR CULT: NORMAL
BARBITURATES UR QL SCN>200 NG/ML: NEGATIVE
BASOPHILS # BLD AUTO: 0.02 K/UL (ref 0–0.2)
BASOPHILS NFR BLD: 0.1 % (ref 0–1.9)
BENZODIAZ UR QL SCN>200 NG/ML: NEGATIVE
BILIRUB SERPL-MCNC: 0.9 MG/DL (ref 0.1–1)
BUN SERPL-MCNC: 21 MG/DL (ref 8–23)
BZE UR QL SCN: NEGATIVE
CALCIUM SERPL-MCNC: 9.1 MG/DL (ref 8.7–10.5)
CANNABINOIDS UR QL SCN: NEGATIVE
CHLORIDE SERPL-SCNC: 111 MMOL/L (ref 95–110)
CO2 SERPL-SCNC: 17 MMOL/L (ref 23–29)
CREAT SERPL-MCNC: 1.2 MG/DL (ref 0.5–1.4)
CREAT UR-MCNC: 130.4 MG/DL (ref 15–325)
DIFFERENTIAL METHOD: ABNORMAL
EOSINOPHIL # BLD AUTO: 0 K/UL (ref 0–0.5)
EOSINOPHIL NFR BLD: 0.1 % (ref 0–8)
ERYTHROCYTE [DISTWIDTH] IN BLOOD BY AUTOMATED COUNT: 13.3 % (ref 11.5–14.5)
EST. GFR  (NO RACE VARIABLE): 47 ML/MIN/1.73 M^2
GLUCOSE SERPL-MCNC: 112 MG/DL (ref 70–110)
HCT VFR BLD AUTO: 36.9 % (ref 37–48.5)
HGB BLD-MCNC: 11.5 G/DL (ref 12–16)
IMM GRANULOCYTES # BLD AUTO: 0.11 K/UL (ref 0–0.04)
IMM GRANULOCYTES NFR BLD AUTO: 0.7 % (ref 0–0.5)
LYMPHOCYTES # BLD AUTO: 1.3 K/UL (ref 1–4.8)
LYMPHOCYTES NFR BLD: 7.9 % (ref 18–48)
MAGNESIUM SERPL-MCNC: 1.8 MG/DL (ref 1.6–2.6)
MCH RBC QN AUTO: 29.8 PG (ref 27–31)
MCHC RBC AUTO-ENTMCNC: 31.2 G/DL (ref 32–36)
MCV RBC AUTO: 96 FL (ref 82–98)
METHADONE UR QL SCN>300 NG/ML: NEGATIVE
MONOCYTES # BLD AUTO: 1.4 K/UL (ref 0.3–1)
MONOCYTES NFR BLD: 8.2 % (ref 4–15)
NEUTROPHILS # BLD AUTO: 13.7 K/UL (ref 1.8–7.7)
NEUTROPHILS NFR BLD: 83 % (ref 38–73)
NRBC BLD-RTO: 0 /100 WBC
OPIATES UR QL SCN: ABNORMAL
PCP UR QL SCN>25 NG/ML: NEGATIVE
PHOSPHATE SERPL-MCNC: 1.7 MG/DL (ref 2.7–4.5)
PLATELET # BLD AUTO: 248 K/UL (ref 150–450)
PMV BLD AUTO: 9.3 FL (ref 9.2–12.9)
POTASSIUM SERPL-SCNC: 3.7 MMOL/L (ref 3.5–5.1)
PROT SERPL-MCNC: 6.5 G/DL (ref 6–8.4)
RBC # BLD AUTO: 3.86 M/UL (ref 4–5.4)
SODIUM SERPL-SCNC: 141 MMOL/L (ref 136–145)
TOXICOLOGY INFORMATION: ABNORMAL
TROPONIN I SERPL DL<=0.01 NG/ML-MCNC: 1.62 NG/ML (ref 0–0.03)
VANCOMYCIN SERPL-MCNC: 16.1 UG/ML
WBC # BLD AUTO: 16.5 K/UL (ref 3.9–12.7)

## 2023-09-30 PROCEDURE — 25000003 PHARM REV CODE 250: Performed by: FAMILY MEDICINE

## 2023-09-30 PROCEDURE — 84100 ASSAY OF PHOSPHORUS: CPT | Performed by: HOSPITALIST

## 2023-09-30 PROCEDURE — 63600175 PHARM REV CODE 636 W HCPCS: Performed by: HOSPITALIST

## 2023-09-30 PROCEDURE — 84484 ASSAY OF TROPONIN QUANT: CPT | Performed by: FAMILY MEDICINE

## 2023-09-30 PROCEDURE — 63600175 PHARM REV CODE 636 W HCPCS: Performed by: FAMILY MEDICINE

## 2023-09-30 PROCEDURE — 80053 COMPREHEN METABOLIC PANEL: CPT | Performed by: HOSPITALIST

## 2023-09-30 PROCEDURE — 85025 COMPLETE CBC W/AUTO DIFF WBC: CPT | Performed by: HOSPITALIST

## 2023-09-30 PROCEDURE — 83735 ASSAY OF MAGNESIUM: CPT | Performed by: HOSPITALIST

## 2023-09-30 PROCEDURE — 25000003 PHARM REV CODE 250: Performed by: HOSPITALIST

## 2023-09-30 PROCEDURE — 80307 DRUG TEST PRSMV CHEM ANLYZR: CPT | Performed by: FAMILY MEDICINE

## 2023-09-30 PROCEDURE — 36415 COLL VENOUS BLD VENIPUNCTURE: CPT | Performed by: FAMILY MEDICINE

## 2023-09-30 PROCEDURE — 11000001 HC ACUTE MED/SURG PRIVATE ROOM

## 2023-09-30 PROCEDURE — 80202 ASSAY OF VANCOMYCIN: CPT | Performed by: FAMILY MEDICINE

## 2023-09-30 RX ORDER — ENOXAPARIN SODIUM 100 MG/ML
40 INJECTION SUBCUTANEOUS EVERY 24 HOURS
Status: DISCONTINUED | OUTPATIENT
Start: 2023-09-30 | End: 2023-10-01 | Stop reason: HOSPADM

## 2023-09-30 RX ORDER — LINEZOLID 600 MG/1
600 TABLET, FILM COATED ORAL EVERY 12 HOURS
Status: DISCONTINUED | OUTPATIENT
Start: 2023-10-01 | End: 2023-10-01 | Stop reason: HOSPADM

## 2023-09-30 RX ORDER — OXYCODONE HYDROCHLORIDE 5 MG/1
5 TABLET ORAL EVERY 6 HOURS PRN
Status: DISCONTINUED | OUTPATIENT
Start: 2023-09-30 | End: 2023-10-01 | Stop reason: HOSPADM

## 2023-09-30 RX ORDER — MUPIROCIN 20 MG/G
OINTMENT TOPICAL 2 TIMES DAILY
Status: DISCONTINUED | OUTPATIENT
Start: 2023-09-30 | End: 2023-10-01 | Stop reason: HOSPADM

## 2023-09-30 RX ADMIN — ACETAMINOPHEN 650 MG: 325 TABLET ORAL at 04:09

## 2023-09-30 RX ADMIN — OXYCODONE HYDROCHLORIDE 5 MG: 5 TABLET ORAL at 05:09

## 2023-09-30 RX ADMIN — MUPIROCIN: 20 OINTMENT TOPICAL at 12:09

## 2023-09-30 RX ADMIN — MUPIROCIN: 20 OINTMENT TOPICAL at 09:09

## 2023-09-30 RX ADMIN — SODIUM CHLORIDE: 9 INJECTION, SOLUTION INTRAVENOUS at 05:09

## 2023-09-30 RX ADMIN — SODIUM CHLORIDE: 9 INJECTION, SOLUTION INTRAVENOUS at 02:09

## 2023-09-30 RX ADMIN — CEFTRIAXONE 1 G: 1 INJECTION, POWDER, FOR SOLUTION INTRAMUSCULAR; INTRAVENOUS at 04:09

## 2023-09-30 RX ADMIN — VANCOMYCIN HYDROCHLORIDE 1500 MG: 1.5 INJECTION, POWDER, LYOPHILIZED, FOR SOLUTION INTRAVENOUS at 09:09

## 2023-09-30 RX ADMIN — ENOXAPARIN SODIUM 40 MG: 40 INJECTION SUBCUTANEOUS at 05:09

## 2023-09-30 NOTE — PROGRESS NOTES
Vancomycin day # 2 (level therapeutic this morning). MRSA in urine culture. We will switch therapy to oral Zyvox starting tomorrow AM x 5 more days to total 7 days of therapy (vanc level will still be therapeutic tomorrow).    Therapy with vancomycin complete and/or consult discontinued by provider.  Pharmacy will sign off, please re-consult as needed.    Thank you for allowing us to participate in this patient's care.   Katherine McArdle, Pharm.D. 9/30/2023 10:20 AM

## 2023-09-30 NOTE — ASSESSMENT & PLAN NOTE
Patient found to have elevated troponin measuring 6.180 with repeat measuring 4.910 in absence of reported chest pain.  EKG without evidence of acute ischemia noted.  Likely secondary to acute renal failure however, patient was previously seen in ED on 09/26 for presumed syncope/cardiac arrest/accidental drug overdose.  Plan:  -Telemetry  -serial EKGs   -trend troponin levels  -cardiology consult pending clinical course     9/30:  Cardiology on case  troponins trending down   Possibly due to pt receiving compressions

## 2023-09-30 NOTE — ASSESSMENT & PLAN NOTE
Patient with acute kidney injury/acute renal failure likely due to pre-renal azotemia due to dehydration AMANDA is currently currently receiving IVFs and being treated for UTI. Baseline creatinine wnl - Labs reviewed- Renal function/electrolytes with Estimated Creatinine Clearance: 48.6 mL/min (based on SCr of 1.2 mg/dL). according to latest data. Monitor urine output and serial BMP and adjust therapy as needed. Avoid nephrotoxins and renally dose meds for GFR listed above.    Creatinine at baseline

## 2023-09-30 NOTE — ASSESSMENT & PLAN NOTE
This patient does have evidence of infective focus  My overall impression is sepsis.  Source: Urinary Tract  Antibiotics given-   Antibiotics (72h ago, onward)    Start     Stop Route Frequency Ordered    09/30/23 0945  vancomycin 1,500 mg in dextrose 5 % (D5W) 250 mL IVPB (Vial-Mate)         -- IV Once 09/30/23 0834    09/29/23 1600  cefTRIAXone (ROCEPHIN) 1 g in dextrose 5 % in water (D5W) 100 mL IVPB (MB+)  ( Urinary Tract Infection (UTI))         10/04/23 1559 IV Every 24 hours (non-standard times) 09/28/23 1944    09/29/23 0920  vancomycin - pharmacy to dose  (vancomycin IVPB (PEDS and ADULTS))        See Hyperspace for full Linked Orders Report.    -- IV pharmacy to manage frequency 09/29/23 0820        Latest lactate reviewed-  Recent Labs   Lab 09/28/23  1524 09/28/23  1904   LACTATE 1.7 1.0     Organ dysfunction indicated by Acute kidney injury and Encephalopathy    Fluid challenge Ideal Body Weight- The patient's ideal body weight is Ideal body weight: 61.6 kg (135 lb 12.9 oz) which will be used to calculate fluid bolus of 30 ml/kg for treatment of septic shock.      Post- resuscitation assessment No - Post resuscitation assessment not needed     Will Not start Pressors- Levophed for MAP of 65  Source control achieved by: Rocephin    9/30:  Pt afebrile overnight  Cont rocephin and vanc   Blood cultures pending

## 2023-09-30 NOTE — PROGRESS NOTES
Pharmacist Renal Dose Adjustment Note    Desirae No is a 73 y.o. female being treated with the medication enoxaparin    Patient Data:    Vital Signs (Most Recent):  Temp: 98.7 °F (37.1 °C) (09/30/23 0735)  Pulse: 91 (09/30/23 0735)  Resp: 16 (09/30/23 0735)  BP: (!) 176/82 (09/30/23 0735)  SpO2: 96 % (09/30/23 0735) Vital Signs (72h Range):  Temp:  [98.7 °F (37.1 °C)-101.1 °F (38.4 °C)]   Pulse:  []   Resp:  [16-24]   BP: (102-176)/(51-82)   SpO2:  [93 %-96 %]      Recent Labs   Lab 09/28/23  1524 09/29/23  0520 09/30/23  0646   CREATININE 3.7* 2.2* 1.2     Serum creatinine: 1.2 mg/dL 09/30/23 0646  Estimated creatinine clearance: 48.6 mL/min    Medication: enoxaparin dose:  30 mg frequency daily will be changed to medication: enoxaparin dose: 40 mg frequency:daily, per protocol for CrCl > 30 mL/min.    Pharmacist's Name: Yanni Marin

## 2023-09-30 NOTE — PLAN OF CARE
Problem: Adult Inpatient Plan of Care  Goal: Plan of Care Review  Outcome: Ongoing, Progressing  Flowsheets (Taken 9/30/2023 0326)  Plan of Care Reviewed With: patient  Goal: Patient-Specific Goal (Individualized)  Outcome: Ongoing, Progressing  Goal: Absence of Hospital-Acquired Illness or Injury  Outcome: Ongoing, Progressing  Intervention: Identify and Manage Fall Risk  Flowsheets (Taken 9/30/2023 0326)  Safety Promotion/Fall Prevention:   assistive device/personal item within reach   Fall Risk reviewed with patient/family   Fall Risk signage in place   nonskid shoes/socks when out of bed   side rails raised x 3   /camera at bedside  Intervention: Prevent Skin Injury  Flowsheets (Taken 9/30/2023 0326)  Skin Protection: incontinence pads utilized  Intervention: Prevent and Manage VTE (Venous Thromboembolism) Risk  Flowsheets (Taken 9/30/2023 0326)  Activity Management: Rolling - L1  VTE Prevention/Management: fluids promoted  Intervention: Prevent Infection  Flowsheets (Taken 9/30/2023 0326)  Infection Prevention:   rest/sleep promoted   hand hygiene promoted  Goal: Optimal Comfort and Wellbeing  Outcome: Ongoing, Progressing  Intervention: Monitor Pain and Promote Comfort  Flowsheets (Taken 9/30/2023 0326)  Pain Management Interventions:   care clustered   relaxation techniques promoted   quiet environment facilitated  Intervention: Provide Person-Centered Care  Flowsheets (Taken 9/30/2023 0326)  Trust Relationship/Rapport:   choices provided   care explained   emotional support provided   empathic listening provided   thoughts/feelings acknowledged   reassurance provided   questions encouraged   questions answered  Goal: Readiness for Transition of Care  Outcome: Ongoing, Progressing     Problem: Adjustment to Illness (Sepsis/Septic Shock)  Goal: Optimal Coping  Outcome: Ongoing, Progressing     Problem: Bleeding (Sepsis/Septic Shock)  Goal: Absence of Bleeding  Outcome: Ongoing, Progressing      Problem: Glycemic Control Impaired (Sepsis/Septic Shock)  Goal: Blood Glucose Level Within Desired Range  Outcome: Ongoing, Progressing     Problem: Infection Progression (Sepsis/Septic Shock)  Goal: Absence of Infection Signs and Symptoms  Outcome: Ongoing, Progressing     Problem: Nutrition Impaired (Sepsis/Septic Shock)  Goal: Optimal Nutrition Intake  Outcome: Ongoing, Progressing     Problem: Fluid and Electrolyte Imbalance (Acute Kidney Injury/Impairment)  Goal: Fluid and Electrolyte Balance  Outcome: Ongoing, Progressing     Problem: Oral Intake Inadequate (Acute Kidney Injury/Impairment)  Goal: Optimal Nutrition Intake  Outcome: Ongoing, Progressing     Problem: Renal Function Impairment (Acute Kidney Injury/Impairment)  Goal: Effective Renal Function  Outcome: Ongoing, Progressing     Problem: Skin Injury Risk Increased  Goal: Skin Health and Integrity  Outcome: Ongoing, Progressing     Problem: Infection  Goal: Absence of Infection Signs and Symptoms  Outcome: Ongoing, Progressing

## 2023-09-30 NOTE — ASSESSMENT & PLAN NOTE
Patient presented with altered mental status while meeting SIRS criteria for sepsis and was found to have evidence of UTI on UA.  CT head negative for acute intracranial abnormalities.  Patient initiated on Rocephin with cultures obtained and pending.  Plan:  -NPO  -fall precautions  -delirium/dementia precautions  -IVFs  -Tylenol p.r.n. for fever  -antiemetics p.r.n.  -continue treatment of sepsis     9/30:  Afebrile  Mentation appears improved in comparison to yesterday

## 2023-09-30 NOTE — SUBJECTIVE & OBJECTIVE
Interval History: No issues overnight. Afebrile.     Review of Systems   Unable to perform ROS: Mental status change     Objective:     Vital Signs (Most Recent):  Temp: 98.7 °F (37.1 °C) (09/30/23 0735)  Pulse: 91 (09/30/23 0735)  Resp: 16 (09/30/23 0735)  BP: (!) 176/82 (09/30/23 0735)  SpO2: 96 % (09/30/23 0735) Vital Signs (24h Range):  Temp:  [98.7 °F (37.1 °C)-101.1 °F (38.4 °C)] 98.7 °F (37.1 °C)  Pulse:  [] 91  Resp:  [16-18] 16  SpO2:  [93 %-96 %] 96 %  BP: (102-176)/(60-82) 176/82     Weight: 97.5 kg (215 lb)  Body mass index is 33.67 kg/m².    Intake/Output Summary (Last 24 hours) at 9/30/2023 0913  Last data filed at 9/30/2023 0400  Gross per 24 hour   Intake --   Output 900 ml   Net -900 ml         Physical Exam  Constitutional:       General: She is not in acute distress.     Appearance: She is well-developed. She is not diaphoretic.   HENT:      Head: Normocephalic and atraumatic.   Eyes:      Pupils: Pupils are equal, round, and reactive to light.   Cardiovascular:      Rate and Rhythm: Normal rate and regular rhythm.      Heart sounds: Normal heart sounds. No murmur heard.     No friction rub. No gallop.   Pulmonary:      Effort: Pulmonary effort is normal. No respiratory distress.      Breath sounds: Normal breath sounds. No stridor. No wheezing or rales.   Abdominal:      General: Bowel sounds are normal. There is no distension.      Palpations: Abdomen is soft. There is no mass.      Tenderness: There is no abdominal tenderness. There is no guarding.   Genitourinary:     Comments: Suprapubic catheter  Skin:     General: Skin is warm.      Findings: No erythema.   Neurological:      Mental Status: She is alert. She is disoriented.      Comments: Oriented x 1             Significant Labs: All pertinent labs within the past 24 hours have been reviewed.    Significant Imaging: I have reviewed all pertinent imaging results/findings within the past 24 hours.

## 2023-10-01 VITALS
DIASTOLIC BLOOD PRESSURE: 57 MMHG | OXYGEN SATURATION: 96 % | BODY MASS INDEX: 33.74 KG/M2 | HEART RATE: 62 BPM | SYSTOLIC BLOOD PRESSURE: 146 MMHG | RESPIRATION RATE: 18 BRPM | WEIGHT: 215 LBS | TEMPERATURE: 98 F | HEIGHT: 67 IN

## 2023-10-01 LAB
ALBUMIN SERPL BCP-MCNC: 2.6 G/DL (ref 3.5–5.2)
ALP SERPL-CCNC: 85 U/L (ref 55–135)
ALT SERPL W/O P-5'-P-CCNC: 41 U/L (ref 10–44)
ANION GAP SERPL CALC-SCNC: 11 MMOL/L (ref 8–16)
AST SERPL-CCNC: 37 U/L (ref 10–40)
BASOPHILS # BLD AUTO: 0.02 K/UL (ref 0–0.2)
BASOPHILS NFR BLD: 0.2 % (ref 0–1.9)
BILIRUB SERPL-MCNC: 0.4 MG/DL (ref 0.1–1)
BUN SERPL-MCNC: 20 MG/DL (ref 8–23)
CALCIUM SERPL-MCNC: 8.7 MG/DL (ref 8.7–10.5)
CHLORIDE SERPL-SCNC: 110 MMOL/L (ref 95–110)
CO2 SERPL-SCNC: 18 MMOL/L (ref 23–29)
CREAT SERPL-MCNC: 0.9 MG/DL (ref 0.5–1.4)
DIFFERENTIAL METHOD: ABNORMAL
EOSINOPHIL # BLD AUTO: 0.4 K/UL (ref 0–0.5)
EOSINOPHIL NFR BLD: 3.4 % (ref 0–8)
ERYTHROCYTE [DISTWIDTH] IN BLOOD BY AUTOMATED COUNT: 13.2 % (ref 11.5–14.5)
EST. GFR  (NO RACE VARIABLE): >60 ML/MIN/1.73 M^2
GLUCOSE SERPL-MCNC: 108 MG/DL (ref 70–110)
HCT VFR BLD AUTO: 35.2 % (ref 37–48.5)
HGB BLD-MCNC: 11 G/DL (ref 12–16)
IMM GRANULOCYTES # BLD AUTO: 0.06 K/UL (ref 0–0.04)
IMM GRANULOCYTES NFR BLD AUTO: 0.5 % (ref 0–0.5)
LYMPHOCYTES # BLD AUTO: 1.9 K/UL (ref 1–4.8)
LYMPHOCYTES NFR BLD: 15.4 % (ref 18–48)
MAGNESIUM SERPL-MCNC: 1.7 MG/DL (ref 1.6–2.6)
MCH RBC QN AUTO: 29.3 PG (ref 27–31)
MCHC RBC AUTO-ENTMCNC: 31.3 G/DL (ref 32–36)
MCV RBC AUTO: 94 FL (ref 82–98)
MONOCYTES # BLD AUTO: 0.8 K/UL (ref 0.3–1)
MONOCYTES NFR BLD: 6.5 % (ref 4–15)
NEUTROPHILS # BLD AUTO: 9.3 K/UL (ref 1.8–7.7)
NEUTROPHILS NFR BLD: 74 % (ref 38–73)
NRBC BLD-RTO: 0 /100 WBC
PHOSPHATE SERPL-MCNC: 2.3 MG/DL (ref 2.7–4.5)
PLATELET # BLD AUTO: 241 K/UL (ref 150–450)
PMV BLD AUTO: 9.6 FL (ref 9.2–12.9)
POTASSIUM SERPL-SCNC: 3.7 MMOL/L (ref 3.5–5.1)
PROT SERPL-MCNC: 5.9 G/DL (ref 6–8.4)
RBC # BLD AUTO: 3.75 M/UL (ref 4–5.4)
SODIUM SERPL-SCNC: 139 MMOL/L (ref 136–145)
WBC # BLD AUTO: 12.48 K/UL (ref 3.9–12.7)

## 2023-10-01 PROCEDURE — 85025 COMPLETE CBC W/AUTO DIFF WBC: CPT | Performed by: HOSPITALIST

## 2023-10-01 PROCEDURE — 83735 ASSAY OF MAGNESIUM: CPT | Performed by: HOSPITALIST

## 2023-10-01 PROCEDURE — 25000003 PHARM REV CODE 250: Performed by: FAMILY MEDICINE

## 2023-10-01 PROCEDURE — 36415 COLL VENOUS BLD VENIPUNCTURE: CPT | Performed by: HOSPITALIST

## 2023-10-01 PROCEDURE — 84100 ASSAY OF PHOSPHORUS: CPT | Performed by: HOSPITALIST

## 2023-10-01 PROCEDURE — 80053 COMPREHEN METABOLIC PANEL: CPT | Performed by: HOSPITALIST

## 2023-10-01 RX ORDER — LINEZOLID 600 MG/1
600 TABLET, FILM COATED ORAL EVERY 12 HOURS
Qty: 10 TABLET | Refills: 0 | Status: SHIPPED | OUTPATIENT
Start: 2023-10-01 | End: 2023-10-06

## 2023-10-01 RX ADMIN — MUPIROCIN: 20 OINTMENT TOPICAL at 08:10

## 2023-10-01 RX ADMIN — OXYCODONE HYDROCHLORIDE 5 MG: 5 TABLET ORAL at 08:10

## 2023-10-01 RX ADMIN — OXYCODONE HYDROCHLORIDE 5 MG: 5 TABLET ORAL at 12:10

## 2023-10-01 RX ADMIN — LINEZOLID 600 MG: 600 TABLET, FILM COATED ORAL at 08:10

## 2023-10-01 NOTE — DISCHARGE SUMMARY
"[facility]  Discharge Summary      Patient Name: Desirae No  MRN: [id number]  [facility]: [id number]  Patient Class: IP- Inpatient  Admission Date: 9/28/2023  Hospital Length of Stay: 2 days  Discharge Date and Time:  10/01/2023 11:16 AM  Attending Physician: Dennis Garcia MD   Discharging Provider: Dennis Garcia MD  Primary Care Provider: Iram Gomez NP    Primary Care Team: Networked reference to record PCT     HPI:   Desirae No is a 73 y.o. female with a PMH  has a past medical history of DDD (degenerative disc disease), lumbar (4/11/2014), Hypertension, Neurogenic bladder, and Pulmonary embolism (4/11/2014). who presented to the ED via EMS for further evaluation of altered mental status x2 days duration.  Patient was seen in the ED on 09/26 following syncope episode and potential accidental drug overdose.  Patient received CPR for approximately 10 minutes after EMS was unable to palpate pulse and was administered Narcan with patient becoming responsive.  Patient was treated in the ED and discharged back home with diagnosis of UTI.  Per chart review, patient usually endorses syncope episodes while using the restroom.  At time of discharge, patient was initiated on cefdinir and had progressively worsening mental status since returning home despite compliance with medications.  History was obtained through chart review and ED sign-out S patient continues to be altered at time of bedside assessment and only responding "hi" intermittently and not following commands.  Attempted to contact family without success.  EMS earlier tonight also reported questionable right-sided weakness but unable to confirm this due to current mentation.  Initial workup in the ED revealed patient met SIRS criteria for sepsis with UA positive for UTI, troponin elevated measuring 6.180 in absence of ischemia on EKG, in addition to acute renal failure.  CT head negative for acute intracranial abnormalities.  Patient admitted " to Hospital Medicine inpatient for continued medical management.    PCP: Iram Gomez        * No surgery found *      Hospital Course:   Patient admitted for ams secondary to UTI. She was started on rocephin and abx broadened with addition of vanc. Urine culture grew MRSA and enteroccous. Abx changed to po zyvox, mentation much improved. Pt discharged home.            Goals of Care Treatment Preferences:  Code Status: Full Code      Consults:   Consults (From admission, onward)        Status Ordering Provider     Inpatient consult to Midline team  Once        Provider:  (Not yet assigned)    Acknowledged ALO VERA     Inpatient consult to Cardiology  Once        Provider:  Nino Lazcano MD    Completed VANNESSA MCGARRY     IP consult to case management  Once        Provider:  (Not yet assigned)    Completed ALO VERA          No new Assessment & Plan notes have been filed under this hospital service since the last note was generated.  Service: Hospital Medicine    Final Active Diagnoses:    Diagnosis Date Noted POA    PRINCIPAL PROBLEM:  Altered mental status [R41.82] 09/29/2023 Yes    Obesity due to excess calories with serious comorbidity [E66.09] 09/29/2023 Yes    Sepsis [A41.9] 07/20/2021 Yes    AMANDA (acute kidney injury) [N17.9] 07/20/2021 Yes    Elevated troponin [R79.89] 05/23/2017 Yes    Suprapubic catheter [Z93.59] 02/03/2017 Not Applicable     Chronic    Neurogenic bladder [N31.9] 10/14/2014 Yes      Problems Resolved During this Admission:       Discharged Condition: good    Disposition:     Follow Up:   Follow-up Information     Iram Gomez, NP Follow up in 1 week(s).    Specialty: Internal Medicine  Contact information:  [address] [address] [address] [phone]                       Patient Instructions:   No discharge procedures on file.    Significant Diagnostic Studies: N/A    Pending Diagnostic Studies:     None          Medications:  Reconciled Home Medications:      Medication List      START taking these medications    linezolid 600 mg Tab  Commonly known as: ZYVOX  Take 1 tablet (600 mg total) by mouth every 12 (twelve) hours. for 5 days        CONTINUE taking these medications    aspirin 81 MG EC tablet  Commonly known as: ECOTRIN  Take 1 tablet (81 mg total) by mouth once daily.     estradioL 0.01 % (0.1 mg/gram) vaginal cream  Commonly known as: ESTRACE  Place 2 g vaginally 3 (three) times a week.     eszopiclone 2 MG Tab  Commonly known as: LUNESTA  Take 2 mg by mouth every evening.     gabapentin 600 MG tablet  Commonly known as: NEURONTIN  Take 600 mg by mouth 2 (two) times daily.     LIDOcaine 5 %  Commonly known as: LIDODERM  Place 1 patch onto the skin 3 (three) times daily as needed.     losartan 50 MG tablet  Commonly known as: COZAAR  Take 50 mg by mouth every morning.     oxybutynin 10 MG 24 hr tablet  Commonly known as: DITROPAN-XL  Take 10 mg by mouth once daily.     oxyCODONE 15 MG Tab  Commonly known as: ROXICODONE  Take 15 mg by mouth 3 (three) times daily as needed.     promethazine 25 MG tablet  Commonly known as: PHENERGAN  Take 1 tablet (25 mg total) by mouth every 6 (six) hours as needed for Nausea.     tiZANidine 4 MG tablet  Commonly known as: ZANAFLEX  Take 4 mg by mouth 2 (two) times a day.     topiramate 50 MG tablet  Commonly known as: TOPAMAX  Take 50 mg by mouth 2 (two) times daily.            Indwelling Lines/Drains at time of discharge:   Lines/Drains/Airways     Drain  Duration                Suprapubic Catheter 07/20/21 0350 latex 16 Fr. 803 days                Time spent on the discharge of patient: 37 minutes         Denins Garcia MD  Department of Hospital Medicine  [facility]

## 2023-10-01 NOTE — PLAN OF CARE
Problem: Adult Inpatient Plan of Care  Goal: Plan of Care Review  Outcome: Ongoing, Progressing     Problem: Adult Inpatient Plan of Care  Goal: Patient-Specific Goal (Individualized)  Outcome: Ongoing, Progressing  Flowsheets (Taken 10/1/2023 0219)  Anxieties, Fears or Concerns: pain control  Individualized Care Needs: turn q2     Problem: Adjustment to Illness (Sepsis/Septic Shock)  Goal: Optimal Coping  Outcome: Ongoing, Progressing     Problem: Skin Injury Risk Increased  Goal: Skin Health and Integrity  Outcome: Ongoing, Progressing     Problem: Infection  Goal: Absence of Infection Signs and Symptoms  Outcome: Ongoing, Progressing     Discussed POC with pt, verbalized understanding.  Patient remains free from injury. Safety precautions maintained. No s/s of acute distress.  Purposeful rounding every two hours.   Pain controlled per MD order. IVF infusing.   Cardiac monitoring in place, tele box number [id number]  Diet orders continued, pt diet: regular  Vital signs continued per orders this shift.   Q2 turning continued this shift.  Chronic catheter care continued this shift.  Avasys at bedside for patient safety.  Chart and orders review completed. Pt education about care completed.

## 2023-10-01 NOTE — PLAN OF CARE
[facility]  Discharge Final Note    Primary Care Provider: Iram Gomez NP    Expected Discharge Date: 10/1/2023    Final Discharge Note (most recent)       Final Note - 10/01/23 0944          Final Note    Assessment Type Final Discharge Note (P)      Anticipated Discharge Disposition Home-Health Care Svc (P)    [facility]       Post-Acute Status    Post-Acute Authorization Home Health (P)      Home Health Status Set-up Complete/Auth obtained (P)    [facility]    Discharge Delays None known at this time (P)                      Important Message from Medicare             Lucia Rodriguez LMSW 10/1/2023 9:44 AM

## 2023-10-01 NOTE — PLAN OF CARE
Patient being discharged home per MD order in stable condition. Patient remains free from injury/falls during stay. Midline removed per MD order, catheter intact.

## 2023-10-04 LAB
BACTERIA BLD CULT: NORMAL
BACTERIA BLD CULT: NORMAL

## 2024-01-01 PROBLEM — N17.9 AKI (ACUTE KIDNEY INJURY): Status: RESOLVED | Noted: 2021-07-20 | Resolved: 2024-01-01

## 2024-01-01 PROBLEM — A41.9 SEPSIS: Status: RESOLVED | Noted: 2021-07-20 | Resolved: 2024-01-01

## 2025-06-25 NOTE — PROGRESS NOTES
Preventing Falls: Care Instructions  Injuries and health problems such as trouble walking or poor eyesight can increase your risk of falling. So can some medicines. But there are things you can do to help prevent falls. You can exercise to get stronger. You can also arrange your home to make it safer.    Talk to your doctor about the medicines you take. Ask if any of them increase the risk of falls and whether they can be changed or stopped.   Try to exercise regularly. It can help improve your strength and balance. This can help lower your risk of falling.         Practice fall safety and prevention.   Wear low-heeled shoes that fit well and give your feet good support. Talk to your doctor if you have foot problems that make this hard.  Carry a cellphone or wear a medical alert device that you can use to call for help.  Use stepladders instead of chairs to reach high objects. Don't climb if you're at risk for falls. Ask for help, if needed.  Wear the correct eyeglasses, if you need them.        Make your home safer.   Remove rugs, cords, clutter, and furniture from walkways.  Keep your house well lit. Use night-lights in hallways and bathrooms.  Install and use sturdy handrails on stairways.  Wear nonskid footwear, even inside. Don't walk barefoot or in socks without shoes.        Be safe outside.   Use handrails, curb cuts, and ramps whenever possible.  Keep your hands free by using a shoulder bag or backpack.  Try to walk in well-lit areas. Watch out for uneven ground, changes in pavement, and debris.  Be careful in the winter. Walk on the grass or gravel when sidewalks are slippery. Use de-icer on steps and walkways. Add non-slip devices to shoes.    Put grab bars and nonskid mats in your shower or tub and near the toilet. Try to use a shower chair or bath bench when bathing.   Get into a tub or shower by putting in your weaker leg first. Get out with your strong side first. Have a phone or medical alert  "[facility]  Progress Note    Patient Name: Desirae No  MRN: [id number]  Patient Class: IP- Inpatient   Admission Date: 9/28/2023  Length of Stay: 1 days  Attending Physician: Dennis Garcia MD  Primary Care Provider: Iram Gomez NP        Subjective:     Principal Problem:Altered mental status        HPI:  Desirae No is a 73 y.o. female with a PMH  has a past medical history of DDD (degenerative disc disease), lumbar (4/11/2014), Hypertension, Neurogenic bladder, and Pulmonary embolism (4/11/2014). who presented to the ED via EMS for further evaluation of altered mental status x2 days duration.  Patient was seen in the ED on 09/26 following syncope episode and potential accidental drug overdose.  Patient received CPR for approximately 10 minutes after EMS was unable to palpate pulse and was administered Narcan with patient becoming responsive.  Patient was treated in the ED and discharged back home with diagnosis of UTI.  Per chart review, patient usually endorses syncope episodes while using the restroom.  At time of discharge, patient was initiated on cefdinir and had progressively worsening mental status since returning home despite compliance with medications.  History was obtained through chart review and ED sign-out S patient continues to be altered at time of bedside assessment and only responding "hi" intermittently and not following commands.  Attempted to contact family without success.  EMS earlier tonight also reported questionable right-sided weakness but unable to confirm this due to current mentation.  Initial workup in the ED revealed patient met SIRS criteria for sepsis with UA positive for UTI, troponin elevated measuring 6.180 in absence of ischemia on EKG, in addition to acute renal failure.  CT head negative for acute intracranial abnormalities.  Patient admitted to Hospital Medicine inpatient for continued medical management.    PCP: Iram Gomez " F.        Overview/Hospital Course:  9/29: pt febrile this am, will add vanc. Cont rocephin. Should pt continue to be febrile may need to consider LP should infectious source other than UTI be suspected. Troponins elevated. Pt received compressions a couple days ago, will consult cards on case.   9/30: mentation improving. Cont vanc and rocephin. Blood cultures pending. Urine culture showing multiple organisms. Troponins trending down.       Interval History: No issues overnight. Afebrile.     Review of Systems   Unable to perform ROS: Mental status change     Objective:     Vital Signs (Most Recent):  Temp: 98.7 °F (37.1 °C) (09/30/23 0735)  Pulse: 91 (09/30/23 0735)  Resp: 16 (09/30/23 0735)  BP: (!) 176/82 (09/30/23 0735)  SpO2: 96 % (09/30/23 0735) Vital Signs (24h Range):  Temp:  [98.7 °F (37.1 °C)-101.1 °F (38.4 °C)] 98.7 °F (37.1 °C)  Pulse:  [] 91  Resp:  [16-18] 16  SpO2:  [93 %-96 %] 96 %  BP: (102-176)/(60-82) 176/82     Weight: 97.5 kg (215 lb)  Body mass index is 33.67 kg/m².    Intake/Output Summary (Last 24 hours) at 9/30/2023 0913  Last data filed at 9/30/2023 0400  Gross per 24 hour   Intake --   Output 900 ml   Net -900 ml         Physical Exam  Constitutional:       General: She is not in acute distress.     Appearance: She is well-developed. She is not diaphoretic.   HENT:      Head: Normocephalic and atraumatic.   Eyes:      Pupils: Pupils are equal, round, and reactive to light.   Cardiovascular:      Rate and Rhythm: Normal rate and regular rhythm.      Heart sounds: Normal heart sounds. No murmur heard.     No friction rub. No gallop.   Pulmonary:      Effort: Pulmonary effort is normal. No respiratory distress.      Breath sounds: Normal breath sounds. No stridor. No wheezing or rales.   Abdominal:      General: Bowel sounds are normal. There is no distension.      Palpations: Abdomen is soft. There is no mass.      Tenderness: There is no abdominal tenderness. There is no guarding.    Genitourinary:     Comments: Suprapubic catheter  Skin:     General: Skin is warm.      Findings: No erythema.   Neurological:      Mental Status: She is alert. She is disoriented.      Comments: Oriented x 1             Significant Labs: All pertinent labs within the past 24 hours have been reviewed.    Significant Imaging: I have reviewed all pertinent imaging results/findings within the past 24 hours.        Assessment/Plan:      * Altered mental status  Patient presented with altered mental status while meeting SIRS criteria for sepsis and was found to have evidence of UTI on UA.  CT head negative for acute intracranial abnormalities.  Patient initiated on Rocephin with cultures obtained and pending.  Plan:  -NPO  -fall precautions  -delirium/dementia precautions  -IVFs  -Tylenol p.r.n. for fever  -antiemetics p.r.n.  -continue treatment of sepsis     9/30:  Afebrile  Mentation appears improved in comparison to yesterday        Obesity due to excess calories with serious comorbidity  Body mass index is 33.77 kg/m². Morbid obesity complicates all aspects of disease management from diagnostic modalities to treatment. Weight loss encouraged and health benefits explained to patient.       Sepsis  This patient does have evidence of infective focus  My overall impression is sepsis.  Source: Urinary Tract  Antibiotics given-   Antibiotics (72h ago, onward)    Start     Stop Route Frequency Ordered    09/30/23 0945  vancomycin 1,500 mg in dextrose 5 % (D5W) 250 mL IVPB (Vial-Mate)         -- IV Once 09/30/23 0834    09/29/23 1600  cefTRIAXone (ROCEPHIN) 1 g in dextrose 5 % in water (D5W) 100 mL IVPB (MB+)  ( Urinary Tract Infection (UTI))         10/04/23 1559 IV Every 24 hours (non-standard times) 09/28/23 1944    09/29/23 0920  vancomycin - pharmacy to dose  (vancomycin IVPB (PEDS and ADULTS))        See Jack for full Linked Orders Report.    -- IV pharmacy to manage frequency 09/29/23 0820        Latest  lactate reviewed-  Recent Labs   Lab 09/28/23  1524 09/28/23  1904   LACTATE 1.7 1.0     Organ dysfunction indicated by Acute kidney injury and Encephalopathy    Fluid challenge Ideal Body Weight- The patient's ideal body weight is Ideal body weight: 61.6 kg (135 lb 12.9 oz) which will be used to calculate fluid bolus of 30 ml/kg for treatment of septic shock.      Post- resuscitation assessment No - Post resuscitation assessment not needed     Will Not start Pressors- Levophed for MAP of 65  Source control achieved by: Rocephin    9/30:  Pt afebrile overnight  Cont rocephin and vanc   Blood cultures pending      AMANDA (acute kidney injury)  Patient with acute kidney injury/acute renal failure likely due to pre-renal azotemia due to dehydration AMANDA is currently currently receiving IVFs and being treated for UTI. Baseline creatinine wnl - Labs reviewed- Renal function/electrolytes with Estimated Creatinine Clearance: 48.6 mL/min (based on SCr of 1.2 mg/dL). according to latest data. Monitor urine output and serial BMP and adjust therapy as needed. Avoid nephrotoxins and renally dose meds for GFR listed above.    Creatinine at baseline    Elevated troponin  Patient found to have elevated troponin measuring 6.180 with repeat measuring 4.910 in absence of reported chest pain.  EKG without evidence of acute ischemia noted.  Likely secondary to acute renal failure however, patient was previously seen in ED on 09/26 for presumed syncope/cardiac arrest/accidental drug overdose.  Plan:  -Telemetry  -serial EKGs   -trend troponin levels  -cardiology consult pending clinical course     9/30:  Cardiology on case  troponins trending down   Possibly due to pt receiving compressions      Suprapubic catheter        Neurogenic bladder  Patient with known neurogenic bladder status post suprapubic catheter currently undergoes exchange every 2 weeks.  Patient also reportedly suffers from recurrent UTIs.  Patient initiated on Rocephin  with cultures obtained and pending.  Plan:  -continue antibiotics  -f/u cultures  -urology consulted for catheter exchange        VTE Risk Mitigation (From admission, onward)         Ordered     enoxaparin injection 30 mg  Daily         09/28/23 1944     IP VTE HIGH RISK PATIENT  Once         09/28/23 1944     Place sequential compression device  Until discontinued         09/28/23 1944                Discharge Planning   SO:      Code Status: Full Code   Is the patient medically ready for discharge?:     Reason for patient still in hospital (select all that apply): Patient trending condition  Discharge Plan A: Home Health                  Dennis Garcia MD  Department of Hospital Medicine   [facility]

## (undated) DEVICE — ELECTRODE REM PLYHSV RETURN 9

## (undated) DEVICE — SEE MEDLINE ITEM 157117

## (undated) DEVICE — SYR 10CC LUER LOCK

## (undated) DEVICE — GAUZE SPONGE 4X4 12PLY

## (undated) DEVICE — SOL NS 1000CC

## (undated) DEVICE — SEE MEDLINE ITEM 157137

## (undated) DEVICE — TAPE CLOTH SOFT MEDIPORE 4IN

## (undated) DEVICE — SEE MEDLINE ITEM 157027

## (undated) DEVICE — APPLICATOR CHLORAPREP ORN 26ML

## (undated) DEVICE — GLOVE PROTEXIS LTX MICRO  7

## (undated) DEVICE — SEE MEDLINE ITEM 152622

## (undated) DEVICE — COVER OVERHEAD SURG LT BLUE